# Patient Record
Sex: FEMALE | Race: WHITE | NOT HISPANIC OR LATINO | Employment: PART TIME | URBAN - METROPOLITAN AREA
[De-identification: names, ages, dates, MRNs, and addresses within clinical notes are randomized per-mention and may not be internally consistent; named-entity substitution may affect disease eponyms.]

---

## 2023-03-27 ENCOUNTER — TRANSFERRED RECORDS (OUTPATIENT)
Dept: HEALTH INFORMATION MANAGEMENT | Facility: CLINIC | Age: 33
End: 2023-03-27

## 2024-05-25 ENCOUNTER — APPOINTMENT (OUTPATIENT)
Dept: MRI IMAGING | Facility: CLINIC | Age: 34
DRG: 103 | End: 2024-05-25
Payer: MEDICARE

## 2024-05-25 ENCOUNTER — APPOINTMENT (OUTPATIENT)
Dept: CT IMAGING | Facility: CLINIC | Age: 34
DRG: 103 | End: 2024-05-25
Attending: EMERGENCY MEDICINE
Payer: MEDICARE

## 2024-05-25 ENCOUNTER — HOSPITAL ENCOUNTER (INPATIENT)
Facility: CLINIC | Age: 34
LOS: 2 days | Discharge: HOME | DRG: 103 | End: 2024-05-27
Attending: EMERGENCY MEDICINE | Admitting: STUDENT IN AN ORGANIZED HEALTH CARE EDUCATION/TRAINING PROGRAM
Payer: MEDICARE

## 2024-05-25 DIAGNOSIS — K21.9 GASTROESOPHAGEAL REFLUX DISEASE WITHOUT ESOPHAGITIS: Primary | ICD-10-CM

## 2024-05-25 DIAGNOSIS — G43.919 COMPLICATED MIGRAINE, INTRACTABLE: ICD-10-CM

## 2024-05-25 LAB
ANION GAP SERPL CALCULATED.3IONS-SCNC: 11 MMOL/L (ref 7–15)
APTT PPP: 27 SECONDS (ref 22–38)
BASOPHILS # BLD AUTO: 0 10E3/UL (ref 0–0.2)
BASOPHILS NFR BLD AUTO: 0 %
BUN SERPL-MCNC: 12.1 MG/DL (ref 6–20)
CALCIUM SERPL-MCNC: 9.7 MG/DL (ref 8.6–10)
CHLORIDE SERPL-SCNC: 102 MMOL/L (ref 98–107)
CREAT SERPL-MCNC: 0.98 MG/DL (ref 0.51–0.95)
CRP SERPL-MCNC: <3 MG/L
DEPRECATED HCO3 PLAS-SCNC: 26 MMOL/L (ref 22–29)
EGFRCR SERPLBLD CKD-EPI 2021: 77 ML/MIN/1.73M2
EOSINOPHIL # BLD AUTO: 0.3 10E3/UL (ref 0–0.7)
EOSINOPHIL NFR BLD AUTO: 3 %
ERYTHROCYTE [DISTWIDTH] IN BLOOD BY AUTOMATED COUNT: 11.9 % (ref 10–15)
GLUCOSE BLDC GLUCOMTR-MCNC: 108 MG/DL (ref 70–99)
GLUCOSE BLDC GLUCOMTR-MCNC: 75 MG/DL (ref 70–99)
GLUCOSE SERPL-MCNC: 140 MG/DL (ref 70–99)
HCG SERPL QL: NEGATIVE
HCT VFR BLD AUTO: 39.6 % (ref 35–47)
HGB BLD-MCNC: 14 G/DL (ref 11.7–15.7)
IMM GRANULOCYTES # BLD: 0 10E3/UL
IMM GRANULOCYTES NFR BLD: 0 %
INR PPP: 0.96 (ref 0.85–1.15)
LYMPHOCYTES # BLD AUTO: 3.7 10E3/UL (ref 0.8–5.3)
LYMPHOCYTES NFR BLD AUTO: 40 %
MCH RBC QN AUTO: 32.7 PG (ref 26.5–33)
MCHC RBC AUTO-ENTMCNC: 35.4 G/DL (ref 31.5–36.5)
MCV RBC AUTO: 93 FL (ref 78–100)
MONOCYTES # BLD AUTO: 0.5 10E3/UL (ref 0–1.3)
MONOCYTES NFR BLD AUTO: 6 %
NEUTROPHILS # BLD AUTO: 4.8 10E3/UL (ref 1.6–8.3)
NEUTROPHILS NFR BLD AUTO: 51 %
NRBC # BLD AUTO: 0 10E3/UL
NRBC BLD AUTO-RTO: 0 /100
PLATELET # BLD AUTO: 310 10E3/UL (ref 150–450)
POTASSIUM SERPL-SCNC: 3.9 MMOL/L (ref 3.4–5.3)
RBC # BLD AUTO: 4.28 10E6/UL (ref 3.8–5.2)
SODIUM SERPL-SCNC: 139 MMOL/L (ref 135–145)
TROPONIN T SERPL HS-MCNC: <6 NG/L
WBC # BLD AUTO: 9.3 10E3/UL (ref 4–11)

## 2024-05-25 PROCEDURE — 70450 CT HEAD/BRAIN W/O DYE: CPT

## 2024-05-25 PROCEDURE — 85025 COMPLETE CBC W/AUTO DIFF WBC: CPT | Performed by: EMERGENCY MEDICINE

## 2024-05-25 PROCEDURE — 84703 CHORIONIC GONADOTROPIN ASSAY: CPT | Performed by: EMERGENCY MEDICINE

## 2024-05-25 PROCEDURE — 250N000011 HC RX IP 250 OP 636: Performed by: EMERGENCY MEDICINE

## 2024-05-25 PROCEDURE — 85610 PROTHROMBIN TIME: CPT | Performed by: EMERGENCY MEDICINE

## 2024-05-25 PROCEDURE — 99223 1ST HOSP IP/OBS HIGH 75: CPT | Mod: AI | Performed by: STUDENT IN AN ORGANIZED HEALTH CARE EDUCATION/TRAINING PROGRAM

## 2024-05-25 PROCEDURE — 86140 C-REACTIVE PROTEIN: CPT | Performed by: EMERGENCY MEDICINE

## 2024-05-25 PROCEDURE — 82962 GLUCOSE BLOOD TEST: CPT

## 2024-05-25 PROCEDURE — 85730 THROMBOPLASTIN TIME PARTIAL: CPT | Performed by: EMERGENCY MEDICINE

## 2024-05-25 PROCEDURE — 255N000002 HC RX 255 OP 636: Performed by: EMERGENCY MEDICINE

## 2024-05-25 PROCEDURE — 250N000009 HC RX 250: Performed by: EMERGENCY MEDICINE

## 2024-05-25 PROCEDURE — 120N000002 HC R&B MED SURG/OB UMMC

## 2024-05-25 PROCEDURE — 70496 CT ANGIOGRAPHY HEAD: CPT

## 2024-05-25 PROCEDURE — 258N000003 HC RX IP 258 OP 636: Performed by: EMERGENCY MEDICINE

## 2024-05-25 PROCEDURE — 84484 ASSAY OF TROPONIN QUANT: CPT | Performed by: EMERGENCY MEDICINE

## 2024-05-25 PROCEDURE — A9585 GADOBUTROL INJECTION: HCPCS | Performed by: EMERGENCY MEDICINE

## 2024-05-25 PROCEDURE — 80048 BASIC METABOLIC PNL TOTAL CA: CPT | Performed by: EMERGENCY MEDICINE

## 2024-05-25 PROCEDURE — 70553 MRI BRAIN STEM W/O & W/DYE: CPT

## 2024-05-25 PROCEDURE — 99291 CRITICAL CARE FIRST HOUR: CPT | Mod: G0,FS | Performed by: STUDENT IN AN ORGANIZED HEALTH CARE EDUCATION/TRAINING PROGRAM

## 2024-05-25 PROCEDURE — 36415 COLL VENOUS BLD VENIPUNCTURE: CPT | Performed by: EMERGENCY MEDICINE

## 2024-05-25 RX ORDER — ONDANSETRON 2 MG/ML
4 INJECTION INTRAMUSCULAR; INTRAVENOUS ONCE
Status: COMPLETED | OUTPATIENT
Start: 2024-05-25 | End: 2024-05-25

## 2024-05-25 RX ORDER — DEXAMETHASONE SODIUM PHOSPHATE 10 MG/ML
10 INJECTION, SOLUTION INTRAMUSCULAR; INTRAVENOUS ONCE
Status: COMPLETED | OUTPATIENT
Start: 2024-05-25 | End: 2024-05-25

## 2024-05-25 RX ORDER — ONDANSETRON 2 MG/ML
4 INJECTION INTRAMUSCULAR; INTRAVENOUS
Status: COMPLETED | OUTPATIENT
Start: 2024-05-25 | End: 2024-05-25

## 2024-05-25 RX ORDER — INDOMETHACIN 50 MG/1
100 CAPSULE ORAL 2 TIMES DAILY PRN
COMMUNITY
Start: 2024-05-04

## 2024-05-25 RX ORDER — HYDROMORPHONE HYDROCHLORIDE 1 MG/ML
0.5 INJECTION, SOLUTION INTRAMUSCULAR; INTRAVENOUS; SUBCUTANEOUS ONCE
Status: COMPLETED | OUTPATIENT
Start: 2024-05-25 | End: 2024-05-25

## 2024-05-25 RX ORDER — IOPAMIDOL 755 MG/ML
67 INJECTION, SOLUTION INTRAVASCULAR ONCE
Status: COMPLETED | OUTPATIENT
Start: 2024-05-25 | End: 2024-05-25

## 2024-05-25 RX ORDER — HYDROMORPHONE HYDROCHLORIDE 1 MG/ML
0.5 INJECTION, SOLUTION INTRAMUSCULAR; INTRAVENOUS; SUBCUTANEOUS EVERY 30 MIN PRN
Status: COMPLETED | OUTPATIENT
Start: 2024-05-25 | End: 2024-05-26

## 2024-05-25 RX ORDER — SODIUM CHLORIDE 9 MG/ML
INJECTION, SOLUTION INTRAVENOUS CONTINUOUS
Status: DISCONTINUED | OUTPATIENT
Start: 2024-05-25 | End: 2024-05-27 | Stop reason: HOSPADM

## 2024-05-25 RX ORDER — LEVOTHYROXINE SODIUM 175 UG/1
175 TABLET ORAL
COMMUNITY
Start: 2024-05-04 | End: 2024-07-15

## 2024-05-25 RX ORDER — SODIUM CHLORIDE 9 MG/ML
INJECTION, SOLUTION INTRAVENOUS
Status: COMPLETED
Start: 2024-05-25 | End: 2024-05-25

## 2024-05-25 RX ORDER — AMITRIPTYLINE HYDROCHLORIDE 100 MG/1
100 TABLET ORAL AT BEDTIME
COMMUNITY
Start: 2024-05-11 | End: 2024-07-12

## 2024-05-25 RX ORDER — INSULIN LISPRO 100 [IU]/ML
INJECTION, SOLUTION INTRAVENOUS; SUBCUTANEOUS
COMMUNITY
Start: 2024-05-07 | End: 2024-07-12

## 2024-05-25 RX ORDER — GADOBUTROL 604.72 MG/ML
0.1 INJECTION INTRAVENOUS ONCE
Status: COMPLETED | OUTPATIENT
Start: 2024-05-25 | End: 2024-05-25

## 2024-05-25 RX ADMIN — ONDANSETRON 4 MG: 2 INJECTION INTRAMUSCULAR; INTRAVENOUS at 20:39

## 2024-05-25 RX ADMIN — ONDANSETRON 4 MG: 2 INJECTION INTRAMUSCULAR; INTRAVENOUS at 17:30

## 2024-05-25 RX ADMIN — HYDROMORPHONE HYDROCHLORIDE 0.5 MG: 1 INJECTION, SOLUTION INTRAMUSCULAR; INTRAVENOUS; SUBCUTANEOUS at 20:31

## 2024-05-25 RX ADMIN — DEXAMETHASONE SODIUM PHOSPHATE 10 MG: 10 INJECTION, SOLUTION INTRAMUSCULAR; INTRAVENOUS at 20:31

## 2024-05-25 RX ADMIN — VALPROATE SODIUM 500 MG: 100 INJECTION, SOLUTION INTRAVENOUS at 20:30

## 2024-05-25 RX ADMIN — SODIUM CHLORIDE 500 ML: 9 INJECTION, SOLUTION INTRAVENOUS at 18:24

## 2024-05-25 RX ADMIN — IOPAMIDOL 67 ML: 755 INJECTION, SOLUTION INTRAVENOUS at 16:05

## 2024-05-25 RX ADMIN — GADOBUTROL 8.62 ML: 604.72 INJECTION INTRAVENOUS at 17:57

## 2024-05-25 RX ADMIN — HYDROMORPHONE HYDROCHLORIDE 0.5 MG: 1 INJECTION, SOLUTION INTRAMUSCULAR; INTRAVENOUS; SUBCUTANEOUS at 19:19

## 2024-05-25 RX ADMIN — Medication 500 ML: at 18:24

## 2024-05-25 RX ADMIN — HYDROMORPHONE HYDROCHLORIDE 0.5 MG: 1 INJECTION, SOLUTION INTRAMUSCULAR; INTRAVENOUS; SUBCUTANEOUS at 21:51

## 2024-05-25 RX ADMIN — ONDANSETRON 4 MG: 2 INJECTION INTRAMUSCULAR; INTRAVENOUS at 21:51

## 2024-05-25 RX ADMIN — SODIUM CHLORIDE 80 ML: 9 INJECTION, SOLUTION INTRAVENOUS at 16:05

## 2024-05-25 ASSESSMENT — ACTIVITIES OF DAILY LIVING (ADL)
ADLS_ACUITY_SCORE: 35

## 2024-05-25 ASSESSMENT — COLUMBIA-SUICIDE SEVERITY RATING SCALE - C-SSRS
1. IN THE PAST MONTH, HAVE YOU WISHED YOU WERE DEAD OR WISHED YOU COULD GO TO SLEEP AND NOT WAKE UP?: NO
2. HAVE YOU ACTUALLY HAD ANY THOUGHTS OF KILLING YOURSELF IN THE PAST MONTH?: NO
6. HAVE YOU EVER DONE ANYTHING, STARTED TO DO ANYTHING, OR PREPARED TO DO ANYTHING TO END YOUR LIFE?: NO

## 2024-05-25 NOTE — ED TRIAGE NOTES
Patient is a type 1 diabetic. Patient's blood sugars have been normal, currently 131 in triage. Patient symptoms started two hours ago. Patient begun to have pressure behind her eyes and dizziness. Patient reports neuropathy in the feet, hands, and face. Patient cannot keep her eyes in focus. Patient has abnormal speech with slurring of words. Patient has history of hashimoto's, on synthroid,.      Patient is a smoker. Patient reports marijuana use but is not currently under the influence. Patient takes estrogen pills.

## 2024-05-25 NOTE — CONSULTS
"[facility]     Stroke Code Note          History of Present Illness     Chief Complaint: Stroke Symptoms (Patient has abnormal speech pattern, abnormal gait, new neuropathies )    Rufino Farr is a 33 year female with a PMH significant for neuropathy, T1DM, tobacco use, marijuana use, on OCP.  History obtained from Rufino and her boyfriend at bedside.  At approximately 1:15 PM Rufino developed new slurred speech, blurred vision, horizontal double vision and dizziness.  She described the dizziness as a lightheadedness/faint sensation.  While en route to the emergency room she developed bilateral arm (elbow to fingertips) and bilateral leg (knee to toes) tingling/numbness.  She notes prior episodes of slurred speech in the setting of hypoglycemia but her blood glucose is normal at 131 today.  Presenting BP is 159/116.  Allergy to aspirin.     On telestroke examination, Rufino notes bilateral blurred vision that worsens when she is sitting up.  She notes improvement of her dizziness when sitting and worsening when walking.  She describes her bilateral arm numbness/tingling as a cold sensation or as if \"all the blood was leaving from her body.\"  On neurologic exam, Rufino has left facial sensory deficit, bilateral arm sensory deficit, right lower extremity sensory deficit, right upper extremity drift that resolves with distraction testing, increased effort of right lower extremity elevation but is able to maintain antigravity without drift, extinction is present; no vision loss, dysarthria or aphasia appreciated.  NIH stroke scale 3.  Discussed with Rufino and her boyfriend at bedside that at this time would not recommend TNK as the risks outweigh the benefits and would further evaluate with MRI of the brain. Rufino and her boyfriend verbalized agreement with that plan.          Past Medical History     Stroke risk factors: diabetes, tobacco use     Preadmission " "antithrombotic regimen: none    Modified Kern Score (Pre-morbid)  0 - No symptoms.                 Assessment and Plan       Dizziness, double vision, blurred vision, right facial numbness, bilateral arm numbness, right leg numbness, right upper extremity drift, unclear etiology. Will obtain MRI Brain to evaluate for stroke.      Intravenous Thrombolysis  Not given due to:   - minor/isolated/quickly resolving symptoms     Endovascular Treatment  Not initiated due to absence of proximal vessel occlusion     Plan:  - brain MRI with and without contrast with thin cuts through the brainstem and coronal DWI; please page stroke neurology if infarct is identified for further recommendations  - Toxic/metabolic/infectious evaluation per primary team   - Recommend symptomatic management of dizziness and nausea pre primary team   - MATHEWS    Taylor Max PA-C  Vascular Neurology    To page me or covering stroke neurology team member, click here: [facility]  Choose \"On Call\" tab at top, then select \"NEUROLOGY/ALL SITES\" from middle drop-down box, press Enter, then look for \"stroke\" or \"telestroke\" for your site.  ___________________________________________________________________      Imaging/Labs   (personally reviewed all images)    CT head: No evidence of hemorrhage.  CTA head/neck: No large vessel occlusion.          Physical Examination     BP: (!) 140/90   Pulse: 72   Resp: 15   Temp: 99.2  F (37.3  C)   Temp src: Oral   SpO2: 100 %   O2 Device: None (Room air)   Weight: 86.2 kg (190 lb)    Wt Readings from Last 2 Encounters:   05/25/24 86.2 kg (190 lb)     General Exam  General:  patient lying in bed without any acute distress    HEENT:  normocephalic/atraumatic  Pulmonary:  no respiratory distress    Neuro Exam  Mental Status:  alert, oriented to age and month, follows commands, speech clear and fluent, naming and repetition normal, difficulty reading NIHSS cards due to blurred vision   Cranial Nerves: visual fields " intact (tested by nurse), EOMI with normal smooth pursuit, decreased left facial sensation, bilateral arm decreased sensation, right lower extremity decreased sensation, facial movements symmetric, hearing not formally tested but intact to conversation, no dysarthria, tongue protrusion midline  Motor: increased effort with elevation of right lower extremity but able to maintain antigravity without drift, mild right upper extremity drift, right UE drift not present with distraction testing, able to move all limbs spontaneously, no left upper or lower extremity drift   Reflexes:  unable to test (telestroke)  Sensory: decreased sensation of bilateral arm, decreased right leg sensation, extinction present on double simultaneous stimulation (assessed by nurse)  Coordination:  slow bilateral finger-to-nose, increased effort with heel-to-shin bilaterally but no dysmetria Station/Gait: able to walk a few steps with moderate contact guard assist       Stroke Scales     NIHSS  1a. Level of Consciousness 0-->Alert, keenly responsive   1b. LOC Questions 0-->Answers both questions correctly   1c. LOC Commands 0-->Performs both tasks correctly   2.   Best Gaze 0-->Normal   3.   Visual 0-->No visual loss   4.   Facial Palsy 0-->Normal symmetrical movements   5a. Motor Arm, Left 0-->No drift, limb holds 90 (or 45) degrees for full 10 secs   5b. Motor Arm, Right 1-->Drift, limb holds 90 (or 45) degrees, but drifts down before full 10 secs, does not hit bed or other support   6a. Motor Leg, Left 0-->No drift, leg holds 30 degree position for full 5 secs   6b. Motor Leg, right 0-->No drift, leg holds 30 degree position for full 5 secs   7.   Limb Ataxia 0-->Absent   8.   Sensory 1-->Mild-to-moderate sensory loss, patient feels pinprick is less sharp or is dull on the affected side, or there is a loss of superficial pain with pinprick, but patient is aware of being touched   9.   Best Language 0-->No aphasia, normal   10. Dysarthria  "0-->Normal   11. Extinction and Inattention  1-->Visual, tactile, auditory, spatial, or personal inattention or extinction to bilateral simultaneous stimulation in one of the sensory modalities   Total 3 (05/25/24 1621)       Labs     CBC  Lab Results   Component Value Date    HGB 14.0 05/25/2024    HCT 39.6 05/25/2024    WBC 9.3 05/25/2024     05/25/2024       BMP  Lab Results   Component Value Date     05/25/2024    POTASSIUM 3.9 05/25/2024    CHLORIDE 102 05/25/2024    CO2 26 05/25/2024    BUN 12.1 05/25/2024    CR 0.98 (H) 05/25/2024     (H) 05/25/2024    YESY 9.7 05/25/2024       INR  INR   Date Value Ref Range Status   05/25/2024 0.96 0.85 - 1.15 Final       Data   Stroke Code Data  (for stroke code with tele)  Stroke code activated 05/25/24  1546   First stroke provider response 05/25/24  1548   Video start time 05/25/24  1518   Video end time 05/25/24  1550   Last known normal 05/25/24  1345   Time of discovery (or onset of symptoms)  05/25/24  1345   Head CT read by Stroke Neuro Provider 05/25/24  1554   Was stroke code de-escalated? Yes  05/25/24        Telestroke Service Details  Type of service telemedicine diagnostic assessment of acute neurological changes   Reason telemedicine is appropriate patient requires assessment with a specialist for diagnosis and treatment of neurological symptoms   Mode of transmission secure interactive audio and video communication per Lennie   Originating site (patient location) [facility]    Distant site (provider location) [facility]        Clinically Significant Risk Factors Present on Admission                       # Obesity: Estimated body mass index is 32.61 kg/m  as calculated from the following:    Height as of this encounter: 1.626 m (5' 4\").    Weight as of this encounter: 86.2 kg (190 lb).              Time Spent on this Encounter   Billing: I personally " examined and evaluated the patient today. At the time of my evaluation and management the patient was critical condition today due to stroke like symptoms . I personally managed stroke code. Key decisions made today included examination, review of images, need for further images. I spent a total of 90 minutes providing critical care services, evaluating the patient, directing care and reviewing laboratory values and radiologic reports.

## 2024-05-25 NOTE — ED PROVIDER NOTES
"    [facility] [place] EMERGENCY DEPARTMENT ([facility])    5/25/24      ED PROVIDER NOTE       History     Chief Complaint   Patient presents with    Stroke Symptoms     Patient has abnormal speech pattern, abnormal gait, new neuropathies      HPI  Rufino Farr is a 33 year old female with a previous history of diabetes who apparently 2 hours prior to arrival developed an episode of slurred speech with ataxia.  Apparently these symptoms are similar to when the patient drops her blood sugar and becomes hypoglycemic although she is wearing a pump and the monitor indicates a normal blood sugar. The patient denies headache, is anxious, hyperventilating and presents to the stabilization room for evaluation.    This part of the medical record was transcribed by Alyse Moran Medical Scribe, from a dictation done by Darian Michael MD.      Past Medical History  No past medical history on file.    No past surgical history on file.    No current outpatient medications on file.    Allergies   Allergen Reactions    Aspirin Shortness Of Breath     Family History  No family history on file.    Social History       Past medical history, past surgical history, medications, allergies, family history, and social history were reviewed with the patient. No additional pertinent items.   A complete review of systems was performed with pertinent positives and negatives noted in the HPI, and all other systems negative.    Physical Exam   BP: (!) 159/116  Pulse: 69  Temp: 99.2  F (37.3  C)  Resp: 16  Height: 162.6 cm (5' 4\")  Weight: 86.2 kg (190 lb)  SpO2: 99 %  Physical Exam  Vitals and nursing note reviewed.   Constitutional:       Appearance: She is diaphoretic.      Comments: Patient was brought to the stabilization room anxious and hyperventilating   HENT:      Head: Atraumatic.   Eyes:      Extraocular Movements: Extraocular movements intact.      Pupils: Pupils are equal, round, and reactive to light.   Cardiovascular: "      Rate and Rhythm: Regular rhythm.   Pulmonary:      Comments: Good breath sounds bilaterally  Musculoskeletal:         General: No deformity.      Cervical back: Neck supple.   Neurological:      General: No focal deficit present.      Cranial Nerves: No cranial nerve deficit.      Motor: No weakness.   Psychiatric:      Comments: Anxious and hyperventilating           ED Course, Procedures, & Data     Patient was placed on cardiac monitoring and oximetry with IV being established and was treated like a stroke code.    Patient's EKG revealed a normal sinus rhythm at a rate of 83 with a OH interval point 114 and a QRS duration of 0.090.  The patient had a normal axis with no acute ST or T wave changes significant for ischemia.  This is read by me personally.    Medications   sodium chloride 0.9 % infusion ( Intravenous Rate/Dose Change 5/25/24 2159)   HYDROmorphone (PF) (DILAUDID) injection 0.5 mg (0.5 mg Intravenous $Given 5/25/24 2151)   iopamidol (ISOVUE-370) solution 67 mL (67 mLs Intravenous $Given 5/25/24 1605)     And   sodium chloride 0.9 % bag for CT scan flush use (80 mLs As instructed $Given 5/25/24 1605)   ondansetron (ZOFRAN) injection 4 mg (4 mg Intravenous $Given 5/25/24 2039)   sodium chloride 0.9% BOLUS 500 mL (500 mLs Intravenous $New Bag 5/25/24 1824)   gadobutrol (GADAVIST) injection 8.62 mL (8.62 mLs Intravenous $Given 5/25/24 1757)   HYDROmorphone (PF) (DILAUDID) injection 0.5 mg (0.5 mg Intravenous $Given 5/25/24 1919)   dexAMETHasone PF (DECADRON) injection 10 mg (10 mg Intravenous $Given 5/25/24 2031)   valproate (DEPACON) 500 mg in sodium chloride 0.9 % 50 mL intermittent infusion (500 mg Intravenous $New Bag 5/25/24 2030)   ondansetron (ZOFRAN) injection 4 mg (4 mg Intravenous $Given 5/25/24 2151)     Results for orders placed or performed during the hospital encounter of 05/25/24 (from the past 24 hour(s))   EKG 12 lead   Result Value Ref Range    Systolic Blood Pressure  mmHg     Diastolic Blood Pressure  mmHg    Ventricular Rate 83 BPM    Atrial Rate 83 BPM    KS Interval 114 ms    QRS Duration 90 ms     ms    QTc 458 ms    P Axis 73 degrees    R AXIS 84 degrees    T Axis 68 degrees    Interpretation ECG Sinus rhythm  Normal ECG      CBC with Platelets & Differential    Narrative    The following orders were created for panel order CBC with Platelets & Differential.  Procedure                               Abnormality         Status                     ---------                               -----------         ------                     CBC with platelets and d...[[id number]]                      Final result                 Please view results for these tests on the individual orders.   Basic metabolic panel   Result Value Ref Range    Sodium 139 135 - 145 mmol/L    Potassium 3.9 3.4 - 5.3 mmol/L    Chloride 102 98 - 107 mmol/L    Carbon Dioxide (CO2) 26 22 - 29 mmol/L    Anion Gap 11 7 - 15 mmol/L    Urea Nitrogen 12.1 6.0 - 20.0 mg/dL    Creatinine 0.98 (H) 0.51 - 0.95 mg/dL    GFR Estimate 77 >60 mL/min/1.73m2    Calcium 9.7 8.6 - 10.0 mg/dL    Glucose 140 (H) 70 - 99 mg/dL   INR   Result Value Ref Range    INR 0.96 0.85 - 1.15   Partial thromboplastin time   Result Value Ref Range    aPTT 27 22 - 38 Seconds   Troponin T, High Sensitivity   Result Value Ref Range    Troponin T, High Sensitivity <6 <=14 ng/L   hCG Qualitative Pregnancy   Result Value Ref Range    hCG Serum Qualitative Negative Negative   CBC with platelets and differential   Result Value Ref Range    WBC Count 9.3 4.0 - 11.0 10e3/uL    RBC Count 4.28 3.80 - 5.20 10e6/uL    Hemoglobin 14.0 11.7 - 15.7 g/dL    Hematocrit 39.6 35.0 - 47.0 %    MCV 93 78 - 100 fL    MCH 32.7 26.5 - 33.0 pg    MCHC 35.4 31.5 - 36.5 g/dL    RDW 11.9 10.0 - 15.0 %    Platelet Count 310 150 - 450 10e3/uL    % Neutrophils 51 %    % Lymphocytes 40 %    % Monocytes 6 %    % Eosinophils 3 %    % Basophils 0 %    % Immature Granulocytes 0 %     NRBCs per 100 WBC 0 <1 /100    Absolute Neutrophils 4.8 1.6 - 8.3 10e3/uL    Absolute Lymphocytes 3.7 0.8 - 5.3 10e3/uL    Absolute Monocytes 0.5 0.0 - 1.3 10e3/uL    Absolute Eosinophils 0.3 0.0 - 0.7 10e3/uL    Absolute Basophils 0.0 0.0 - 0.2 10e3/uL    Absolute Immature Granulocytes 0.0 <=0.4 10e3/uL    Absolute NRBCs 0.0 10e3/uL   CRP inflammation   Result Value Ref Range    CRP Inflammation <3.00 <5.00 mg/L   CT Head w/o Contrast    Narrative    EXAM: CTA HEAD NECK W CONTRAST, CT HEAD W/O CONTRAST  LOCATION: [facility]  DATE: 5/25/2024    INDICATION: Code Stroke to evaluate for potential thrombolysis and thrombectomy. Ataxia, dysarthria.  COMPARISON: None.  CONTRAST: 67 mL intravenous Isovue 370  TECHNIQUE: Head and neck CT angiogram with IV contrast. Noncontrast head CT followed by axial helical CT images of the head and neck vessels obtained during the arterial phase of intravenous contrast administration. Axial 2D reconstructed images and   multiplanar 3D MIP reconstructed images of the head and neck vessels were performed by the technologist. Dose reduction techniques were used. All stenosis measurements made according to NASCET criteria unless otherwise specified.    FINDINGS:   NONCONTRAST HEAD CT:   INTRACRANIAL CONTENTS: No intracranial hemorrhage, extraaxial collection, or mass effect.  No CT evidence of acute infarct. Normal parenchymal attenuation. Normal ventricles and sulci.     VISUALIZED ORBITS/SINUSES/MASTOIDS: No intraorbital abnormality. No paranasal sinus mucosal disease. No middle ear or mastoid effusion.    BONES/SOFT TISSUES: No acute abnormality.    HEAD CTA:  ANTERIOR CIRCULATION: No stenosis/occlusion, aneurysm, or high flow vascular malformation. Standard [place] anatomy.    POSTERIOR CIRCULATION: No stenosis/occlusion, aneurysm, or high flow vascular malformation. Balanced vertebral arteries supply a normal basilar artery.      DURAL VENOUS SINUSES: Expected enhancement of the major dural venous sinuses.    NECK CTA:  RIGHT CAROTID: No measurable stenosis or dissection.    LEFT CAROTID: No measurable stenosis or dissection.    VERTEBRAL ARTERIES: No focal stenosis or dissection. Balanced vertebral arteries.    AORTIC ARCH: Bovine origin left common carotid artery. No significant stenosis at the origin of the great vessels.    NONVASCULAR STRUCTURES: Unremarkable.      Impression    IMPRESSION:   HEAD CT:  1.  Normal head CT.    HEAD CTA:   1.  Normal CTA [place].    NECK CTA:  1.  Normal neck CTA.      These findings were communicated by phone to Dr. Michael on 5/25/2024 4:25 PM CDT.      CTA Head Neck with Contrast    Narrative    EXAM: CTA HEAD NECK W CONTRAST, CT HEAD W/O CONTRAST  LOCATION: [facility]  DATE: 5/25/2024    INDICATION: Code Stroke to evaluate for potential thrombolysis and thrombectomy. Ataxia, dysarthria.  COMPARISON: None.  CONTRAST: 67 mL intravenous Isovue 370  TECHNIQUE: Head and neck CT angiogram with IV contrast. Noncontrast head CT followed by axial helical CT images of the head and neck vessels obtained during the arterial phase of intravenous contrast administration. Axial 2D reconstructed images and   multiplanar 3D MIP reconstructed images of the head and neck vessels were performed by the technologist. Dose reduction techniques were used. All stenosis measurements made according to NASCET criteria unless otherwise specified.    FINDINGS:   NONCONTRAST HEAD CT:   INTRACRANIAL CONTENTS: No intracranial hemorrhage, extraaxial collection, or mass effect.  No CT evidence of acute infarct. Normal parenchymal attenuation. Normal ventricles and sulci.     VISUALIZED ORBITS/SINUSES/MASTOIDS: No intraorbital abnormality. No paranasal sinus mucosal disease. No middle ear or mastoid effusion.    BONES/SOFT TISSUES: No acute abnormality.    HEAD CTA:  ANTERIOR  CIRCULATION: No stenosis/occlusion, aneurysm, or high flow vascular malformation. Standard [place] anatomy.    POSTERIOR CIRCULATION: No stenosis/occlusion, aneurysm, or high flow vascular malformation. Balanced vertebral arteries supply a normal basilar artery.     DURAL VENOUS SINUSES: Expected enhancement of the major dural venous sinuses.    NECK CTA:  RIGHT CAROTID: No measurable stenosis or dissection.    LEFT CAROTID: No measurable stenosis or dissection.    VERTEBRAL ARTERIES: No focal stenosis or dissection. Balanced vertebral arteries.    AORTIC ARCH: Bovine origin left common carotid artery. No significant stenosis at the origin of the great vessels.    NONVASCULAR STRUCTURES: Unremarkable.      Impression    IMPRESSION:   HEAD CT:  1.  Normal head CT.    HEAD CTA:   1.  Normal CTA [place].    NECK CTA:  1.  Normal neck CTA.      These findings were communicated by phone to Dr. Michael on 5/25/2024 4:25 PM CDT.      MR Brain w/o & w Contrast    Narrative    EXAM: MR BRAIN W/O and W CONTRAST  LOCATION: [facility]  DATE: 5/25/2024    INDICATION: dizzines, double vision, left facial sensory change, bilateral arm sensory deficit, right LE sensory deficit, thin cuts through the brainstem and coronal DWI  COMPARISON: CT head 5/25/2024  CONTRAST: 8.6ml gadavist  TECHNIQUE: Routine multiplanar multisequence head MRI without and with intravenous contrast.    FINDINGS:  INTRACRANIAL CONTENTS: No acute or subacute infarct. No mass, acute hemorrhage, or extra-axial fluid collections. Normal brain parenchymal signal. Normal ventricles and sulci. Normal position of the cerebellar tonsils. No pathologic contrast enhancement.    SELLA: No abnormality accounting for technique.    OSSEOUS STRUCTURES/SOFT TISSUES: Normal marrow signal. The major intracranial vascular flow voids are maintained.     ORBITS: No abnormality accounting for technique.      SINUSES/MASTOIDS: Mild mucosal thickening scattered about the paranasal sinuses. No middle ear or mastoid effusion.       Impression    IMPRESSION:  1.  Normal head MRI.   Glucose by meter   Result Value Ref Range    GLUCOSE BY METER POCT 108 (H) 70 - 99 mg/dL   Glucose by meter   Result Value Ref Range    GLUCOSE BY METER POCT 75 70 - 99 mg/dL       Stroke neurology evaluated the patient and felt the patient was more than likely having a complicated migraine.  Patient states she has had migraines in the past.  Patient was given Decadron and Depakote along with Zofran.  The patient refused Reglan and Benadryl.  She is allergic to Toradol    ED Course as of 05/25/24 2254   Sat May 25, 2024   2108 Patient received Decadron and Depakote around 830 and is starting to feel somewhat better at this time but still has some blurriness of vision and vertigo.  At this point we will wait another hour to check her symptoms and make it admission decision at that time.     Procedures         Patient was observed in the ER for an extended period of time but her symptoms did not clear and she remains with symptoms of blurriness of vision and vertigo with ongoing nausea and a mild headache.  With the ongoing symptoms, the patient will be admitted to the hospital for further care.    Critical Care Addendum  My initial assessment, based on my review of nursing observations and focused history, established a high suspicion that Rufino Farr has ischemic or hemorrhagic stroke, which requires immediate intervention, and therefore she is critically ill.     After the initial assessment, the care team initiated multiple lab tests, initiated IV fluid administration, and consulted with stroke neurology  to provide stabilization care. Due to the critical nature of this patient, I reassessed nursing observations, physical exam, and neurologic status multiple times prior to her disposition.     Time also spent performing documentation,  discussion with consultants, and coordination of care.     Critical care time (excluding teaching time and procedures): Greater than 60 minutes.       Assessment & Plan      I have reviewed the nursing notes.     I have reviewed the treatment plan with neurology and they suggest admitted the patient to medicine at this point.    I have reviewed the findings, diagnosis, and plan with the patient.      Final diagnoses:   Complicated migraine, intractable     Adm Med    Darian Michael MD  [facility] EMERGENCY DEPARTMENT  5/25/2024     Darian Michael MD  05/25/24 1257

## 2024-05-26 LAB
GLUCOSE BLDC GLUCOMTR-MCNC: 104 MG/DL (ref 70–99)
GLUCOSE BLDC GLUCOMTR-MCNC: 135 MG/DL (ref 70–99)
GLUCOSE BLDC GLUCOMTR-MCNC: 136 MG/DL (ref 70–99)
GLUCOSE BLDC GLUCOMTR-MCNC: 144 MG/DL (ref 70–99)
GLUCOSE BLDC GLUCOMTR-MCNC: 161 MG/DL (ref 70–99)
GLUCOSE BLDC GLUCOMTR-MCNC: 213 MG/DL (ref 70–99)

## 2024-05-26 PROCEDURE — 258N000003 HC RX IP 258 OP 636: Performed by: EMERGENCY MEDICINE

## 2024-05-26 PROCEDURE — 258N000003 HC RX IP 258 OP 636: Performed by: STUDENT IN AN ORGANIZED HEALTH CARE EDUCATION/TRAINING PROGRAM

## 2024-05-26 PROCEDURE — 99223 1ST HOSP IP/OBS HIGH 75: CPT

## 2024-05-26 PROCEDURE — 99233 SBSQ HOSP IP/OBS HIGH 50: CPT | Performed by: INTERNAL MEDICINE

## 2024-05-26 PROCEDURE — 250N000011 HC RX IP 250 OP 636: Performed by: EMERGENCY MEDICINE

## 2024-05-26 PROCEDURE — 250N000011 HC RX IP 250 OP 636: Performed by: STUDENT IN AN ORGANIZED HEALTH CARE EDUCATION/TRAINING PROGRAM

## 2024-05-26 PROCEDURE — 250N000013 HC RX MED GY IP 250 OP 250 PS 637: Performed by: STUDENT IN AN ORGANIZED HEALTH CARE EDUCATION/TRAINING PROGRAM

## 2024-05-26 PROCEDURE — 82962 GLUCOSE BLOOD TEST: CPT

## 2024-05-26 PROCEDURE — 250N000013 HC RX MED GY IP 250 OP 250 PS 637: Performed by: INTERNAL MEDICINE

## 2024-05-26 PROCEDURE — 250N000009 HC RX 250: Performed by: STUDENT IN AN ORGANIZED HEALTH CARE EDUCATION/TRAINING PROGRAM

## 2024-05-26 PROCEDURE — 99222 1ST HOSP IP/OBS MODERATE 55: CPT | Performed by: STUDENT IN AN ORGANIZED HEALTH CARE EDUCATION/TRAINING PROGRAM

## 2024-05-26 PROCEDURE — 250N000011 HC RX IP 250 OP 636: Performed by: INTERNAL MEDICINE

## 2024-05-26 PROCEDURE — 120N000002 HC R&B MED SURG/OB UMMC

## 2024-05-26 RX ORDER — CETIRIZINE HYDROCHLORIDE 10 MG/1
10 TABLET ORAL DAILY
Status: DISCONTINUED | OUTPATIENT
Start: 2024-05-27 | End: 2024-05-27 | Stop reason: HOSPADM

## 2024-05-26 RX ORDER — DEXTROSE MONOHYDRATE 25 G/50ML
25-50 INJECTION, SOLUTION INTRAVENOUS
Status: DISCONTINUED | OUTPATIENT
Start: 2024-05-26 | End: 2024-05-27 | Stop reason: HOSPADM

## 2024-05-26 RX ORDER — ONDANSETRON 2 MG/ML
4 INJECTION INTRAMUSCULAR; INTRAVENOUS EVERY 6 HOURS PRN
Status: DISCONTINUED | OUTPATIENT
Start: 2024-05-26 | End: 2024-05-27 | Stop reason: HOSPADM

## 2024-05-26 RX ORDER — AMOXICILLIN 250 MG
2 CAPSULE ORAL 2 TIMES DAILY PRN
Status: DISCONTINUED | OUTPATIENT
Start: 2024-05-26 | End: 2024-05-27 | Stop reason: HOSPADM

## 2024-05-26 RX ORDER — NALOXONE HYDROCHLORIDE 0.4 MG/ML
0.2 INJECTION, SOLUTION INTRAMUSCULAR; INTRAVENOUS; SUBCUTANEOUS
Status: DISCONTINUED | OUTPATIENT
Start: 2024-05-26 | End: 2024-05-27 | Stop reason: HOSPADM

## 2024-05-26 RX ORDER — NALOXONE HYDROCHLORIDE 0.4 MG/ML
0.4 INJECTION, SOLUTION INTRAMUSCULAR; INTRAVENOUS; SUBCUTANEOUS
Status: DISCONTINUED | OUTPATIENT
Start: 2024-05-26 | End: 2024-05-27 | Stop reason: HOSPADM

## 2024-05-26 RX ORDER — CALCIUM CARBONATE 500 MG/1
1000 TABLET, CHEWABLE ORAL 4 TIMES DAILY PRN
Status: DISCONTINUED | OUTPATIENT
Start: 2024-05-26 | End: 2024-05-27 | Stop reason: HOSPADM

## 2024-05-26 RX ORDER — LIDOCAINE 40 MG/G
CREAM TOPICAL
Status: DISCONTINUED | OUTPATIENT
Start: 2024-05-26 | End: 2024-05-27 | Stop reason: HOSPADM

## 2024-05-26 RX ORDER — NICOTINE POLACRILEX 4 MG
15-30 LOZENGE BUCCAL
Status: DISCONTINUED | OUTPATIENT
Start: 2024-05-26 | End: 2024-05-27 | Stop reason: HOSPADM

## 2024-05-26 RX ORDER — PROCHLORPERAZINE 25 MG
25 SUPPOSITORY, RECTAL RECTAL EVERY 12 HOURS PRN
Status: DISCONTINUED | OUTPATIENT
Start: 2024-05-26 | End: 2024-05-27 | Stop reason: HOSPADM

## 2024-05-26 RX ORDER — ESTRADIOL 1 MG/1
1 TABLET ORAL DAILY
Status: DISCONTINUED | OUTPATIENT
Start: 2024-05-26 | End: 2024-05-27 | Stop reason: HOSPADM

## 2024-05-26 RX ORDER — IBUPROFEN 200 MG
800 TABLET ORAL EVERY 6 HOURS PRN
Status: DISCONTINUED | OUTPATIENT
Start: 2024-05-26 | End: 2024-05-27 | Stop reason: HOSPADM

## 2024-05-26 RX ORDER — HYDROMORPHONE HYDROCHLORIDE 1 MG/ML
0.5 INJECTION, SOLUTION INTRAMUSCULAR; INTRAVENOUS; SUBCUTANEOUS EVERY 4 HOURS PRN
Status: DISCONTINUED | OUTPATIENT
Start: 2024-05-26 | End: 2024-05-27 | Stop reason: HOSPADM

## 2024-05-26 RX ORDER — ACETAMINOPHEN 325 MG/1
650 TABLET ORAL EVERY 4 HOURS PRN
Status: DISCONTINUED | OUTPATIENT
Start: 2024-05-26 | End: 2024-05-27 | Stop reason: HOSPADM

## 2024-05-26 RX ORDER — ONDANSETRON 4 MG/1
4 TABLET, ORALLY DISINTEGRATING ORAL EVERY 6 HOURS PRN
Status: DISCONTINUED | OUTPATIENT
Start: 2024-05-26 | End: 2024-05-27 | Stop reason: HOSPADM

## 2024-05-26 RX ORDER — ESTRADIOL 1 MG/1
1 TABLET ORAL EVERY EVENING
COMMUNITY
End: 2024-07-12

## 2024-05-26 RX ORDER — AMOXICILLIN 250 MG
1 CAPSULE ORAL 2 TIMES DAILY PRN
Status: DISCONTINUED | OUTPATIENT
Start: 2024-05-26 | End: 2024-05-27 | Stop reason: HOSPADM

## 2024-05-26 RX ORDER — METRONIDAZOLE 7.5 MG/G
GEL VAGINAL
COMMUNITY

## 2024-05-26 RX ORDER — ONDANSETRON 4 MG/1
4 TABLET, ORALLY DISINTEGRATING ORAL EVERY 8 HOURS PRN
Status: ON HOLD | COMMUNITY
End: 2024-05-27

## 2024-05-26 RX ORDER — PROCHLORPERAZINE MALEATE 5 MG
5-10 TABLET ORAL EVERY 6 HOURS PRN
Status: DISCONTINUED | OUTPATIENT
Start: 2024-05-26 | End: 2024-05-27 | Stop reason: HOSPADM

## 2024-05-26 RX ORDER — DIPHENHYDRAMINE HCL 25 MG
25 CAPSULE ORAL EVERY 6 HOURS PRN
Status: DISCONTINUED | OUTPATIENT
Start: 2024-05-26 | End: 2024-05-27 | Stop reason: HOSPADM

## 2024-05-26 RX ORDER — CETIRIZINE HYDROCHLORIDE 5 MG/1
5 TABLET ORAL DAILY
Status: DISCONTINUED | OUTPATIENT
Start: 2024-05-26 | End: 2024-05-26

## 2024-05-26 RX ORDER — PROCHLORPERAZINE MALEATE 5 MG
10 TABLET ORAL EVERY 6 HOURS PRN
Status: DISCONTINUED | OUTPATIENT
Start: 2024-05-26 | End: 2024-05-26

## 2024-05-26 RX ORDER — PROCHLORPERAZINE 25 MG
25 SUPPOSITORY, RECTAL RECTAL EVERY 12 HOURS PRN
Status: DISCONTINUED | OUTPATIENT
Start: 2024-05-26 | End: 2024-05-26

## 2024-05-26 RX ORDER — CETIRIZINE HYDROCHLORIDE 10 MG/1
10 TABLET ORAL EVERY EVENING
COMMUNITY
End: 2024-09-25

## 2024-05-26 RX ORDER — DIPHENHYDRAMINE HYDROCHLORIDE 50 MG/ML
50 INJECTION INTRAMUSCULAR; INTRAVENOUS ONCE
Status: COMPLETED | OUTPATIENT
Start: 2024-05-26 | End: 2024-05-26

## 2024-05-26 RX ORDER — CETIRIZINE HYDROCHLORIDE 5 MG/1
5 TABLET ORAL ONCE
Qty: 1 TABLET | Refills: 0 | Status: COMPLETED | OUTPATIENT
Start: 2024-05-26 | End: 2024-05-26

## 2024-05-26 RX ADMIN — SODIUM CHLORIDE: 9 INJECTION, SOLUTION INTRAVENOUS at 16:33

## 2024-05-26 RX ADMIN — HYDROMORPHONE HYDROCHLORIDE 0.5 MG: 1 INJECTION, SOLUTION INTRAMUSCULAR; INTRAVENOUS; SUBCUTANEOUS at 16:28

## 2024-05-26 RX ADMIN — CETIRIZINE HYDROCHLORIDE 5 MG: 5 TABLET ORAL at 12:12

## 2024-05-26 RX ADMIN — ACETAMINOPHEN 650 MG: 325 TABLET, FILM COATED ORAL at 12:30

## 2024-05-26 RX ADMIN — ESTRADIOL 1 MG: 1 TABLET ORAL at 10:16

## 2024-05-26 RX ADMIN — SODIUM CHLORIDE: 9 INJECTION, SOLUTION INTRAVENOUS at 05:43

## 2024-05-26 RX ADMIN — HYDROMORPHONE HYDROCHLORIDE 0.5 MG: 1 INJECTION, SOLUTION INTRAMUSCULAR; INTRAVENOUS; SUBCUTANEOUS at 00:10

## 2024-05-26 RX ADMIN — IBUPROFEN 800 MG: 200 TABLET, FILM COATED ORAL at 18:57

## 2024-05-26 RX ADMIN — HYDROMORPHONE HYDROCHLORIDE 0.5 MG: 1 INJECTION, SOLUTION INTRAMUSCULAR; INTRAVENOUS; SUBCUTANEOUS at 12:12

## 2024-05-26 RX ADMIN — VALPROATE SODIUM 500 MG: 100 INJECTION, SOLUTION INTRAVENOUS at 01:06

## 2024-05-26 RX ADMIN — PROCHLORPERAZINE MALEATE 10 MG: 5 TABLET ORAL at 10:16

## 2024-05-26 RX ADMIN — IBUPROFEN 800 MG: 200 TABLET, FILM COATED ORAL at 10:14

## 2024-05-26 RX ADMIN — HYDROMORPHONE HYDROCHLORIDE 0.5 MG: 1 INJECTION, SOLUTION INTRAMUSCULAR; INTRAVENOUS; SUBCUTANEOUS at 21:03

## 2024-05-26 RX ADMIN — CETIRIZINE HYDROCHLORIDE 5 MG: 5 TABLET ORAL at 10:16

## 2024-05-26 RX ADMIN — AMITRIPTYLINE HYDROCHLORIDE 100 MG: 25 TABLET, FILM COATED ORAL at 21:03

## 2024-05-26 RX ADMIN — PROCHLORPERAZINE EDISYLATE 10 MG: 5 INJECTION INTRAMUSCULAR; INTRAVENOUS at 16:28

## 2024-05-26 RX ADMIN — ONDANSETRON 4 MG: 4 TABLET, ORALLY DISINTEGRATING ORAL at 03:41

## 2024-05-26 RX ADMIN — PROCHLORPERAZINE EDISYLATE 10 MG: 5 INJECTION INTRAMUSCULAR; INTRAVENOUS at 23:01

## 2024-05-26 RX ADMIN — AMITRIPTYLINE HYDROCHLORIDE 100 MG: 25 TABLET, FILM COATED ORAL at 01:20

## 2024-05-26 ASSESSMENT — ACTIVITIES OF DAILY LIVING (ADL)
ADLS_ACUITY_SCORE: 29
ADLS_ACUITY_SCORE: 35
ADLS_ACUITY_SCORE: 29
ADLS_ACUITY_SCORE: 20
ADLS_ACUITY_SCORE: 29

## 2024-05-26 NOTE — PROGRESS NOTES
Pt has R arm edema. Spoke to Medicine MD who advised to continue to monitor.     Pt has numbness and tingling in hands and feet; started yesterday. MD paged.

## 2024-05-26 NOTE — MEDICATION SCRIBE - ADMISSION MEDICATION HISTORY
Medication Scribe Admission Medication History    Admission medication history is complete. The information provided in this note is only as accurate as the sources available at the time of the update.    Information Source(s): Patient and CareEverywhere/SureScripts via in-person    Pertinent Information: Patient reported she took levothyroxine this morning on accident but is typically only taken M-F.     Changes made to PTA medication list:  Added: amitriptyline, humalog, indomethacin, levothyroxine   Deleted: None  Changed: None    Allergies reviewed with patient and updates made in EHR: yes    Medication History Completed By: Sobia Sylvester 5/25/2024 10:59 PM    PTA Med List   Medication Sig Last Dose    amitriptyline (ELAVIL) 100 MG tablet Take 100 mg by mouth at bedtime 5/24/2024 at PM    HUMALOG 100 UNIT/ML injection MAX DAILY DOSE OF 65 UNITS VIA INSULIN PUMP 5/25/2024    indomethacin (INDOCIN) 50 MG capsule Take 50 mg by mouth 3 times daily as needed 5/25/2024 at AM    levothyroxine (SYNTHROID/LEVOTHROID) 175 MCG tablet Take 175 mcg by mouth MONDAY TO FRIDAY ONLY 5/25/2024 at AM

## 2024-05-26 NOTE — PLAN OF CARE
Goal Outcome Evaluation:      Plan of Care Reviewed With: patient    Overall Patient Progress: improvingOverall Patient Progress: improving    VS: VSS   O2: SpO2 > 90% and stable on RA. LS clear and equal bilaterally. Denies chest pain and SOB.    Output: Voids spontaneously without difficulty to bathroom    Last BM: 5/24, denies abdominal discomfort. BS active    Activity: Up with A 1   Skin: WDL. 2 Nurse skin assessment completed with Flower LEIJA RN   Pain: Headache, managed with medications    CMS: Intact, AOx4. Denies numbness and tingling.   Dressing: None    Diet: Regular diet. Denies nausea/vomiting.   BG checks before meals and at bedtime.    LDA: L PIV infusing NS at 100 mL/hr.    Equipment: IV pole, personal belongings,    Plan: Continue with plan of care. Call light within reach, pt able to make needs known.    Additional Info:

## 2024-05-26 NOTE — PROGRESS NOTES
"[facility]    Medicine Progress Note - Hospitalist Service, GOLD TEAM 17    Date of Admission:  5/25/2024    Assessment & Plan     Rufino Farr is a 33 year old female with T1DM, hypothyroidism, recurrent nausea and vomiting, who is presented  for blurry vision, dysarthria, and ataxia. She was seen by neurology in ED and ruled out for stroke      Day of [place] [facility] [place] and felt dizzy. She felt like she was stumbling and floating in a dreamlike state. She states her feet, hands, legs, and arms had a soft static and pins and needle sensation.blood sugar was normal.also endorsed some word finding difficulties and needing to concentrate in order to move her body. Her boyfriend brought her to the emergency department for further care.      Blurry vision, dysarthria, ataxia, double vision, blurred vision   - Seen by neurology in the emergency department , had normal brain MRI , normal  CT head CTA neck   - by admitting team there were concerns  for idiopathic intracranial hypertension given patient's age, autoimmune history, hx of worsening symptoms when laying down. , discussed with  neurology and asking for opthalmology to see. If patient does have papilledema then will likely need an LP if not then  no LP , consider of acetazolamide if appropriate  or topiramate, will defer back to neurology   - 5/25 , complains of  double vison coming back, she did describe double vision when was looking at my finger with both eyes  but  also described double vision when looking at my finger with each eye while  the other eye was covered . while covering up   right eye testing left eye she said  \" starting to morph into 2 \" then saw 2 fingers  same thing when covered up other eye   - had issues with finger to nose both sided due to double vision but also missed nose , did well with heal to shin bilaterally, was weak in upper ext and hand  4/5 both " "side, good strength in lower extremities, Babinsky downgoing bilateral . Had reported decreased sensation over left side but not consistent on exam.     - while  I was in there doing exam and interview , she did bend over to pick out fruit from bowl with her mouth and not hand       Headache  - in ED received valproic acid 500 mg x 2, fluids , decadron , now headache Is coming back   - headache more middle of brain  towards fron and behind eyes, aches behind eyes, the pain head like pulsating 6/1-0 , pain on outside rubber band like above brow but goes to back 4-5/10   - laying down flat, moving makes it worse, has not tested with coughing  - also complains of  dizziness, since  1 PM if like someone was spinning her around, in all directions , does stay in one direction then switches  - when dizziness is worse then has tinnitus , ringing in both ears  , she has never had this before    - does have whooshing noise in ears that has been going on for a while, 1-2 years few times a month   -\" cannot get eyes into focus\" , also states has double vision, fuzziness of things worse on the periphery   - different from her migraines headache, but those were  more band like pain in back of h ead , then went to eyes , has not had one in 4 years and was felt to me more tension related   - has auditory processing disorder    - light bothers her more, but not noises . She was in room without light off   -has some upper trap pain but that I snot new   - gets nausea but gets intermittent nausea vomiting x 2 years   - no recent change on estradiol or medications   - states Toradol gets very itchy  and would scratch off her skin but takes ibuprofen without issues at home,  takes about 2 x a week at home for joint pains     Addendum  - discussed with  neurology, has vestibular migraine recommended  zofran  but patient  responded to compazine better so ok to use compazine IV q 8 hr fo rnow, benadryl 50 mg IV x 1 then benadryl 25 mg pu q " "6 hr PRN, continue tylenol, ibuprofen as needed, holding off on extrs  steroids for now in light  of her DM            History Fibromyalgia  - possibly with flare       History of intermittent Nausea vomiting  - uses daily marijuana, has tried being off for weeks to months hopson no improvement in  her nausea vomiting  , does not take hot showers   - had profuse vomiting night before admission   - history negative work up  for gastroparesis in past    -smokes marijuana once or twice a day, which she uses to manage her complex PTSD, anxiety, and major depression     T1DM   - Continue use of ambulatory insulin pump  - continue accu-checks , monitor also for  Hypoglycemia       Hypothyroidism  - Levothyroxine 175 mcg M-F (ordered to start Monday and ordered to end on Fri)      Menopause  -history of hysterectomy and also had bilateral oophorectomy, had history oophorectomy  - She takes estradiol , continue      Anxiety  Depression  - smokes marijuana once or twice a day  - has not been seeing    Just moved back to [place] in March [facility]        Diet: Combination Diet Regular Diet Adult    DVT Prophylaxis: Ambulate every shift  Ascencio Catheter: Not present  Lines: None     Cardiac Monitoring: None  Code Status: Full Code      Clinically Significant Risk Factors Present on Admission                       # Obesity: Estimated body mass index is 32.61 kg/m  as calculated from the following:    Height as of this encounter: 1.626 m (5' 4\").    Weight as of this encounter: 86.2 kg (190 lb).              Disposition Plan     Medically Ready for Discharge: Anticipated in 2-4 Days             Marina Coronel MD  Hospitalist Service, GOLD TEAM 17  [facility]  Securely message with [employer] (more info)  Text page via [employer] Paging/Directory   See signed in provider for up to date coverage information  ______________________________________________________________________    Interval History " "    Her headache is coming back as well as her dizziness and doubel vision as above, some nausea but no emesis     Physical Exam   Vital Signs: Temp: 98.2  F (36.8  C) Temp src: Oral BP: 126/78 Pulse: 78   Resp: 18 SpO2: 98 % O2 Device: None (Room air)    Weight: 190 lbs 0 oz  General appearence: awake alert   no apparent distress    HEENT: EOMI, PEARLA, sclera nonicteric,  moist,  mucus membranes,   NECK : supple  RESPIRATORY: lungs clear    CARDIOVASCULAR:S1 S2 regular rate and rhythm, no rubs gallops or murmurs appreciated  GASTROINTESTINAL:soft, non-distended , non-tender , + bowel sounds,   SKIN: warm and dry, no mottling noted   NEUROLOGIC; awake alert and oriented,describe double vision when was looking at my finger with both eyes  but  also described double vision when looking at my finger with each eye while  the other eye was covered . while covering up   right eye testing left eye she said  \" starting to morph into 2 \" then saw 2 fingers  same thing when covered up other eye   - had issues with finger to nose both sided due to double vision but also missed nose , did well with heal to shin bilaterally, was weak in upper ext and hand  4/5 both side, good strength in lower extremities, Babinsky downgoing bilateral . Had reported decreased sensation over left side but not consistent on exam.     EXTREMITIES: no clubbing, cyanosis or edema , moves all extremity, good pedal pulses   MUSCULOSKELETAL: without deformity     Data     I have personally reviewed the following data over the past 24 hrs:    9.3  \   14.0   / 310     139 102 12.1 /  213 (H)   3.9 26 0.98 (H) \     Trop: <6 BNP: N/A     Procal: N/A CRP: <3.00 Lactic Acid: N/A       INR:  0.96 PTT:  27   D-dimer:  N/A Fibrinogen:  N/A       Imaging results reviewed over the past 24 hrs:   Recent Results (from the past 24 hour(s))   CT Head w/o Contrast    Narrative    EXAM: CTA HEAD NECK W CONTRAST, CT HEAD W/O CONTRAST  LOCATION: [facility]  DATE: 5/25/2024    INDICATION: Code Stroke to evaluate for potential thrombolysis and thrombectomy. Ataxia, dysarthria.  COMPARISON: None.  CONTRAST: 67 mL intravenous Isovue 370  TECHNIQUE: Head and neck CT angiogram with IV contrast. Noncontrast head CT followed by axial helical CT images of the head and neck vessels obtained during the arterial phase of intravenous contrast administration. Axial 2D reconstructed images and   multiplanar 3D MIP reconstructed images of the head and neck vessels were performed by the technologist. Dose reduction techniques were used. All stenosis measurements made according to NASCET criteria unless otherwise specified.    FINDINGS:   NONCONTRAST HEAD CT:   INTRACRANIAL CONTENTS: No intracranial hemorrhage, extraaxial collection, or mass effect.  No CT evidence of acute infarct. Normal parenchymal attenuation. Normal ventricles and sulci.     VISUALIZED ORBITS/SINUSES/MASTOIDS: No intraorbital abnormality. No paranasal sinus mucosal disease. No middle ear or mastoid effusion.    BONES/SOFT TISSUES: No acute abnormality.    HEAD CTA:  ANTERIOR CIRCULATION: No stenosis/occlusion, aneurysm, or high flow vascular malformation. Standard [place] anatomy.    POSTERIOR CIRCULATION: No stenosis/occlusion, aneurysm, or high flow vascular malformation. Balanced vertebral arteries supply a normal basilar artery.     DURAL VENOUS SINUSES: Expected enhancement of the major dural venous sinuses.    NECK CTA:  RIGHT CAROTID: No measurable stenosis or dissection.    LEFT CAROTID: No measurable stenosis or dissection.    VERTEBRAL ARTERIES: No focal stenosis or dissection. Balanced vertebral arteries.    AORTIC ARCH: Bovine origin left common carotid artery. No significant stenosis at the origin of the great vessels.    NONVASCULAR STRUCTURES: Unremarkable.      Impression    IMPRESSION:   HEAD CT:  1.  Normal head CT.    HEAD CTA:   1.  Normal CTA  [place].    NECK CTA:  1.  Normal neck CTA.      These findings were communicated by phone to Dr. Michael on 5/25/2024 4:25 PM CDT.      CTA Head Neck with Contrast    Narrative    EXAM: CTA HEAD NECK W CONTRAST, CT HEAD W/O CONTRAST  LOCATION: [facility]  DATE: 5/25/2024    INDICATION: Code Stroke to evaluate for potential thrombolysis and thrombectomy. Ataxia, dysarthria.  COMPARISON: None.  CONTRAST: 67 mL intravenous Isovue 370  TECHNIQUE: Head and neck CT angiogram with IV contrast. Noncontrast head CT followed by axial helical CT images of the head and neck vessels obtained during the arterial phase of intravenous contrast administration. Axial 2D reconstructed images and   multiplanar 3D MIP reconstructed images of the head and neck vessels were performed by the technologist. Dose reduction techniques were used. All stenosis measurements made according to NASCET criteria unless otherwise specified.    FINDINGS:   NONCONTRAST HEAD CT:   INTRACRANIAL CONTENTS: No intracranial hemorrhage, extraaxial collection, or mass effect.  No CT evidence of acute infarct. Normal parenchymal attenuation. Normal ventricles and sulci.     VISUALIZED ORBITS/SINUSES/MASTOIDS: No intraorbital abnormality. No paranasal sinus mucosal disease. No middle ear or mastoid effusion.    BONES/SOFT TISSUES: No acute abnormality.    HEAD CTA:  ANTERIOR CIRCULATION: No stenosis/occlusion, aneurysm, or high flow vascular malformation. Standard [place] anatomy.    POSTERIOR CIRCULATION: No stenosis/occlusion, aneurysm, or high flow vascular malformation. Balanced vertebral arteries supply a normal basilar artery.     DURAL VENOUS SINUSES: Expected enhancement of the major dural venous sinuses.    NECK CTA:  RIGHT CAROTID: No measurable stenosis or dissection.    LEFT CAROTID: No measurable stenosis or dissection.    VERTEBRAL ARTERIES: No focal stenosis or dissection. Balanced  vertebral arteries.    AORTIC ARCH: Bovine origin left common carotid artery. No significant stenosis at the origin of the great vessels.    NONVASCULAR STRUCTURES: Unremarkable.      Impression    IMPRESSION:   HEAD CT:  1.  Normal head CT.    HEAD CTA:   1.  Normal CTA [place].    NECK CTA:  1.  Normal neck CTA.      These findings were communicated by phone to Dr. Michael on 5/25/2024 4:25 PM CDT.      MR Brain w/o & w Contrast    Narrative    EXAM: MR BRAIN W/O and W CONTRAST  LOCATION: [facility]  DATE: 5/25/2024    INDICATION: dizzines, double vision, left facial sensory change, bilateral arm sensory deficit, right LE sensory deficit, thin cuts through the brainstem and coronal DWI  COMPARISON: CT head 5/25/2024  CONTRAST: 8.6ml gadavist  TECHNIQUE: Routine multiplanar multisequence head MRI without and with intravenous contrast.    FINDINGS:  INTRACRANIAL CONTENTS: No acute or subacute infarct. No mass, acute hemorrhage, or extra-axial fluid collections. Normal brain parenchymal signal. Normal ventricles and sulci. Normal position of the cerebellar tonsils. No pathologic contrast enhancement.    SELLA: No abnormality accounting for technique.    OSSEOUS STRUCTURES/SOFT TISSUES: Normal marrow signal. The major intracranial vascular flow voids are maintained.     ORBITS: No abnormality accounting for technique.     SINUSES/MASTOIDS: Mild mucosal thickening scattered about the paranasal sinuses. No middle ear or mastoid effusion.       Impression    IMPRESSION:  1.  Normal head MRI.     Recent Labs   Lab 05/26/24  1215 05/26/24  0819 05/26/24  0130 05/25/24 2029 05/25/24  1548   WBC  --   --   --   --  9.3   HGB  --   --   --   --  14.0   MCV  --   --   --   --  93   PLT  --   --   --   --  310   INR  --   --   --   --  0.96   NA  --   --   --   --  139   POTASSIUM  --   --   --   --  3.9   CHLORIDE  --   --   --   --  102   CO2  --   --   --   --  26    BUN  --   --   --   --  12.1   CR  --   --   --   --  0.98*   ANIONGAP  --   --   --   --  11   YESY  --   --   --   --  9.7   * 161* 144*   < > 140*    < > = values in this interval not displayed.

## 2024-05-26 NOTE — PHARMACY-CONSULT NOTE
"Pharmacy Was consulted to \"verify formulation and dose of estrogen pill and whether or not it is a combination pill with progesterone. Unable to find and verify in medical record\"    Talked to patient and patient confirmed that she is on Estradiol 1 mg po every evening at home. Last Dose was taken last Friday evening.    Thank you for your consultation. Please ask pharmacy if you have any other questions.  "

## 2024-05-26 NOTE — PLAN OF CARE
"Goal Outcome Evaluation:           Overall Patient Progress: no changeOverall Patient Progress: no change         Psych evaluated pt today. Neurology evaluated pt today.     VS: /71   Pulse 76   Temp 97.6  F (36.4  C) (Oral)   Resp 16   Ht 1.626 m (5' 4\")   Wt 86.2 kg (190 lb)   SpO2 100%   BMI 32.61 kg/m      O2: SpO2 > 90% and stable on RA. LS clear and equal bilaterally. Denies chest pain and SOB.    Output: Voids spontaneously without difficulty to bathroom    Last BM: 5/24, denies abdominal discomfort. BS active    Activity: Up with A2 with walker and GB  Unsteady with walking    Skin: R arm +1 edema   Pain: Headache, managed with Tylenol, Ibuprofen, IV Dilaudid   CMS: Intact, AOx4.   Numbness and tingling in hands and feet; started yesterday   Dressing: None    Diet: Regular diet.   BG checks before meals and at bedtime.   Has nausea intermittent. Compazine given and nausea improved.    LDA: L PIV infusing NS at 100 mL/hr.   Personal home insulin pump LUQ abdomen   Equipment: IV pole, personal belongings,    Plan: Continue with plan of care. Pain management.    Additional Info:  Has bilateral eye double vision that changes to double vision and then changes back to double vision.     Pt experiencing dizziness, headache, hot flashes.     Significant other here during this shift.     This writer advised pt to notify the RN if she gives an insulin pump IV bolus.          "

## 2024-05-26 NOTE — PHARMACY-ADMISSION MEDICATION HISTORY
Pharmacist Admission Medication History    Admission medication history is complete. The information provided in this note is only as accurate as the sources available at the time of the update.    Information Source(s): Patient via phone    Pertinent Information: Patient stated that she just moved from [place] to [place] recently.  Patient has insulin pump on her. She uses Insulin Lispro to fill the pump.     Changes made to PTA medication list:  Added: cetirizine, Estradiol, Metronidazole gel and Ondansetron   Deleted: None  Changed: Indomethacin 50 mg po tid prn >> 100 mg po bid prn    Allergies reviewed with patient and updates made in EHR: no    Medication History Completed By: Nikolay Singh [facility] 5/26/2024 10:13 AM    PTA Med List   Medication Sig Last Dose    amitriptyline (ELAVIL) 100 MG tablet Take 100 mg by mouth at bedtime 5/24/2024 at PM    cetirizine (ZYRTEC) 10 MG tablet Take 10 mg by mouth every evening 5/24/2024    estradiol (ESTRACE) 1 MG tablet Take 1 mg by mouth every evening 5/24/2024    HUMALOG 100 UNIT/ML injection MAX DAILY DOSE OF 65 UNITS VIA INSULIN PUMP 5/25/2024    indomethacin (INDOCIN) 50 MG capsule Take 100 mg by mouth 2 times daily as needed for moderate pain Past Week    levothyroxine (SYNTHROID/LEVOTHROID) 175 MCG tablet Take 175 mcg by mouth MONDAY TO FRIDAY ONLY 5/25/2024 at AM    metroNIDAZOLE (METROGEL) 0.75 % vaginal gel Place vaginally twice a week As needed     ondansetron (ZOFRAN ODT) 4 MG ODT tab Take 4 mg by mouth every 8 hours as needed for nausea More than a month

## 2024-05-26 NOTE — H&P
[facility]    History and Physical - Hospitalist Service       Date of Admission:  5/25/2024    Assessment & Plan      Rufino Farr is a 33 year old female with T1DM, hypothyroidism, recurrent nausea and vomiting, who is presenting today for blurry vision, dysarthria, and ataxia s/p stroke rule out.     Blurry vision, dysarthria, ataxia  Seen by neurology in the emergency department and stroke ruled out. Concern for idiopathic intracranial hypertension given patient's age, autoimmune history, hx of worsening symptoms when laying down. Additionally, complicated migraine also on the ddx. S/p valproate in the emergency department with noticeable improvement in symptoms, per patient.   - valproic acid 500 mg x 1  - consider lumbar puncture and initiation of acetazolamide if appropriate (topiramate also an alternative agent and has been used in the setting of cluster headaches)  - Discussed use of toradol - patient would like to avoid this for now.  - Ordered compazine (can be used as part of headache cocktail) and zofran for nausea management   - Neurology consult placed    T1DM   - Continue use of ambulatory insulin pump  - Hypoglycemia protocol    Hypothyroidism  - Levothyroxine 175 mcg M-F (ordered to start Monday and ordered to end on Fri)    ?Premature Menopause  Patient states that she is taking estrogen for menopause. Unclear whether or not she is taking a combination pill vs. Estrogen replacement therapy.   - Pharmacy consult placed for medication review       Diet: NPO for Medical/Clinical Reasons Except for: No Exceptions    DVT Prophylaxis: Pneumatic Compression Devices  Ascencio Catheter: Not present  Lines: None     Cardiac Monitoring: None  Code Status:  Full Code     Clinically Significant Risk Factors Present on Admission                       # Obesity: Estimated body mass index is 32.61 kg/m  as calculated from the following:    Height as of this encounter:  "1.626 m (5' 4\").    Weight as of this encounter: 86.2 kg (190 lb).              Disposition Plan     Medically Ready for Discharge: Anticipated in 2-4 Days         Nilesh Wilcox MD  Hospitalist Service  [facility]  Securely message with [employer] (more info)  Text page via [facility] Paging/Directory     ______________________________________________________________________    Chief Complaint   Dysarthria, dizziness, blurry vision    History is obtained from the patient and patient's boyfriend.    History of Present Illness   Rufino Farr is a 33 year old female who neuropathy, T1DM, tobacco use, marijuana use, on OCP     Rufino was at the [employer] [place] with her boyfriend when she became nauseated and felt dizzy.  She felt like she was stumbling and floating in a dreamlike state.  She states her feet, hands, legs, and arms had a soft static and pins and needle sensation.  She immediately checked her blood sugar which was normal.  She went to her boyfriend's house that was nearby, sat down, and had some water.  She felt a little bit better before beginning to feel worsening of her symptoms.  During this time, she also endorsed some word finding difficulties and needing to concentrate in order to move her body.  Her boyfriend brought her to the emergency department for further care.    The past 2 years, she has been struggling with nausea and intermittent random vomiting episodes.  This is worse at night.  She will wake up at 3:00 in the morning profusely vomiting, to the point that she has burst blood vessel in her eye.  1 year prior to presentation, she had a gastroparesis study that was negative.  Since that time, she has continued to have intermittent vomiting episodes.  She smokes marijuana once or twice a day, which she uses to manage her complex PTSD, anxiety, and major depression.  She has been prescribed Zofran but will only use it in an absolute emergency.  She was " "placed on a PPI, but felt it was causing more problems so she stopped taking this.    She endorses a history of vertigo and being incredibly sensitive to pressure or elevation changes.  She also notes that she cannot read in a car because she ends up getting nauseated.  She cannot tolerate positional changes.  She has no history of inner ear problems.  Her hearing is fine.  She does endorse an auditory processing disorder.    Her diabetes has been well-controlled.  Her thyroid function has been harder to control.  She takes oral estrogen for \"premature menopause.\"     On presentation, patient was afebrile with a elevated blood pressure of 159/116. She was given dexamethasone 10 mg x 1, Dilaudid 0.5 mg x 4, Zofran, 500 mL normal saline bolus, and valproate.  CBC was unremarkable.  BMP was unremarkable. Neurology was consulted in the emergency department and a stroke code was called. Head and neck CTA was normal.  Head CT normal.  MRI brain with and without contrast without abnormality.  Recommended UDS and symptomatic management of dizziness and nausea, as well as toxic/metabolic/infectious evaluation.  She was admitted to the inpatient unit for further workup and care.      Past Medical History    Type 1 diabetes  Hypothyroidism  Marijauana use   Estrogen use    Past Surgical History  - no relevant past surgical history    Prior to Admission Medications   Prior to Admission Medications   Prescriptions Last Dose Informant Patient Reported? Taking?   HUMALOG 100 UNIT/ML injection 5/25/2024  Yes Yes   Sig: MAX DAILY DOSE OF 65 UNITS VIA INSULIN PUMP   amitriptyline (ELAVIL) 100 MG tablet 5/24/2024 at PM  Yes Yes   Sig: Take 100 mg by mouth at bedtime   indomethacin (INDOCIN) 50 MG capsule 5/25/2024 at AM  Yes Yes   Sig: Take 50 mg by mouth 3 times daily as needed   levothyroxine (SYNTHROID/LEVOTHROID) 175 MCG tablet 5/25/2024 at AM  Yes Yes   Sig: Take 175 mcg by mouth MONDAY TO FRIDAY ONLY      Facility-Administered " Medications: None        Physical Exam   Vital signs: Temp:  [98.2  F (36.8  C)-99.2  F (37.3  C)] 98.2  F (36.8  C)  Pulse:  [60-81] 78  Resp:  [11-18] 18  BP: (119-159)/() 126/78  SpO2:  [98 %-100 %] 98 %    ** Exam limited due to patient distress and availability **    General Appearance: Well developed, well nourished, in significant distress  Skin: Blanca-colored hue to bilateral face, worse with onset of nausea.  HEENT: EOMI intact, anicteric sclera, clear conjunctiva, normal external ear anatomy, normal nose anatomy, no lesions, scars, or masses. Normal mucosa lips, teeth, and gums.   Neck: No masses  Cardiovascular: RRR  Lungs: No tachypnea  Abdomen: Non-distended, no ascites  Musculoskeletal: Muscle tone normal.  Extremities: No cyanosis, clubbing or edema.  Neurologic: Alert and oriented x 3. Distressed with congruent mood. CX II-XII grossly intact. Normal sensation.           Medical Decision Making         Data   Results for orders placed or performed during the hospital encounter of 05/25/24 (from the past 24 hour(s))   EKG 12 lead   Result Value Ref Range    Systolic Blood Pressure  mmHg    Diastolic Blood Pressure  mmHg    Ventricular Rate 83 BPM    Atrial Rate 83 BPM    GA Interval 114 ms    QRS Duration 90 ms     ms    QTc 458 ms    P Axis 73 degrees    R AXIS 84 degrees    T Axis 68 degrees    Interpretation ECG Sinus rhythm  Normal ECG      CBC with Platelets & Differential    Narrative    The following orders were created for panel order CBC with Platelets & Differential.  Procedure                               Abnormality         Status                     ---------                               -----------         ------                     CBC with platelets and d...[[id number]]                      Final result                 Please view results for these tests on the individual orders.   Basic metabolic panel   Result Value Ref Range    Sodium 139 135 - 145 mmol/L    Potassium 3.9  3.4 - 5.3 mmol/L    Chloride 102 98 - 107 mmol/L    Carbon Dioxide (CO2) 26 22 - 29 mmol/L    Anion Gap 11 7 - 15 mmol/L    Urea Nitrogen 12.1 6.0 - 20.0 mg/dL    Creatinine 0.98 (H) 0.51 - 0.95 mg/dL    GFR Estimate 77 >60 mL/min/1.73m2    Calcium 9.7 8.6 - 10.0 mg/dL    Glucose 140 (H) 70 - 99 mg/dL   INR   Result Value Ref Range    INR 0.96 0.85 - 1.15   Partial thromboplastin time   Result Value Ref Range    aPTT 27 22 - 38 Seconds   Troponin T, High Sensitivity   Result Value Ref Range    Troponin T, High Sensitivity <6 <=14 ng/L   hCG Qualitative Pregnancy   Result Value Ref Range    hCG Serum Qualitative Negative Negative   CBC with platelets and differential   Result Value Ref Range    WBC Count 9.3 4.0 - 11.0 10e3/uL    RBC Count 4.28 3.80 - 5.20 10e6/uL    Hemoglobin 14.0 11.7 - 15.7 g/dL    Hematocrit 39.6 35.0 - 47.0 %    MCV 93 78 - 100 fL    MCH 32.7 26.5 - 33.0 pg    MCHC 35.4 31.5 - 36.5 g/dL    RDW 11.9 10.0 - 15.0 %    Platelet Count 310 150 - 450 10e3/uL    % Neutrophils 51 %    % Lymphocytes 40 %    % Monocytes 6 %    % Eosinophils 3 %    % Basophils 0 %    % Immature Granulocytes 0 %    NRBCs per 100 WBC 0 <1 /100    Absolute Neutrophils 4.8 1.6 - 8.3 10e3/uL    Absolute Lymphocytes 3.7 0.8 - 5.3 10e3/uL    Absolute Monocytes 0.5 0.0 - 1.3 10e3/uL    Absolute Eosinophils 0.3 0.0 - 0.7 10e3/uL    Absolute Basophils 0.0 0.0 - 0.2 10e3/uL    Absolute Immature Granulocytes 0.0 <=0.4 10e3/uL    Absolute NRBCs 0.0 10e3/uL   CRP inflammation   Result Value Ref Range    CRP Inflammation <3.00 <5.00 mg/L   CT Head w/o Contrast    Narrative    EXAM: CTA HEAD NECK W CONTRAST, CT HEAD W/O CONTRAST  LOCATION: [facility]  DATE: 5/25/2024    INDICATION: Code Stroke to evaluate for potential thrombolysis and thrombectomy. Ataxia, dysarthria.  COMPARISON: None.  CONTRAST: 67 mL intravenous Isovue 370  TECHNIQUE: Head and neck CT angiogram with IV contrast.  Noncontrast head CT followed by axial helical CT images of the head and neck vessels obtained during the arterial phase of intravenous contrast administration. Axial 2D reconstructed images and   multiplanar 3D MIP reconstructed images of the head and neck vessels were performed by the technologist. Dose reduction techniques were used. All stenosis measurements made according to NASCET criteria unless otherwise specified.    FINDINGS:   NONCONTRAST HEAD CT:   INTRACRANIAL CONTENTS: No intracranial hemorrhage, extraaxial collection, or mass effect.  No CT evidence of acute infarct. Normal parenchymal attenuation. Normal ventricles and sulci.     VISUALIZED ORBITS/SINUSES/MASTOIDS: No intraorbital abnormality. No paranasal sinus mucosal disease. No middle ear or mastoid effusion.    BONES/SOFT TISSUES: No acute abnormality.    HEAD CTA:  ANTERIOR CIRCULATION: No stenosis/occlusion, aneurysm, or high flow vascular malformation. Standard [place] anatomy.    POSTERIOR CIRCULATION: No stenosis/occlusion, aneurysm, or high flow vascular malformation. Balanced vertebral arteries supply a normal basilar artery.     DURAL VENOUS SINUSES: Expected enhancement of the major dural venous sinuses.    NECK CTA:  RIGHT CAROTID: No measurable stenosis or dissection.    LEFT CAROTID: No measurable stenosis or dissection.    VERTEBRAL ARTERIES: No focal stenosis or dissection. Balanced vertebral arteries.    AORTIC ARCH: Bovine origin left common carotid artery. No significant stenosis at the origin of the great vessels.    NONVASCULAR STRUCTURES: Unremarkable.      Impression    IMPRESSION:   HEAD CT:  1.  Normal head CT.    HEAD CTA:   1.  Normal CTA [place].    NECK CTA:  1.  Normal neck CTA.      These findings were communicated by phone to Dr. Michael on 5/25/2024 4:25 PM CDT.      CTA Head Neck with Contrast    Narrative    EXAM: CTA HEAD NECK W CONTRAST, CT HEAD W/O CONTRAST  LOCATION: [facility]  DATE: 5/25/2024    INDICATION: Code Stroke to evaluate for potential thrombolysis and thrombectomy. Ataxia, dysarthria.  COMPARISON: None.  CONTRAST: 67 mL intravenous Isovue 370  TECHNIQUE: Head and neck CT angiogram with IV contrast. Noncontrast head CT followed by axial helical CT images of the head and neck vessels obtained during the arterial phase of intravenous contrast administration. Axial 2D reconstructed images and   multiplanar 3D MIP reconstructed images of the head and neck vessels were performed by the technologist. Dose reduction techniques were used. All stenosis measurements made according to NASCET criteria unless otherwise specified.    FINDINGS:   NONCONTRAST HEAD CT:   INTRACRANIAL CONTENTS: No intracranial hemorrhage, extraaxial collection, or mass effect.  No CT evidence of acute infarct. Normal parenchymal attenuation. Normal ventricles and sulci.     VISUALIZED ORBITS/SINUSES/MASTOIDS: No intraorbital abnormality. No paranasal sinus mucosal disease. No middle ear or mastoid effusion.    BONES/SOFT TISSUES: No acute abnormality.    HEAD CTA:  ANTERIOR CIRCULATION: No stenosis/occlusion, aneurysm, or high flow vascular malformation. Standard [place] anatomy.    POSTERIOR CIRCULATION: No stenosis/occlusion, aneurysm, or high flow vascular malformation. Balanced vertebral arteries supply a normal basilar artery.     DURAL VENOUS SINUSES: Expected enhancement of the major dural venous sinuses.    NECK CTA:  RIGHT CAROTID: No measurable stenosis or dissection.    LEFT CAROTID: No measurable stenosis or dissection.    VERTEBRAL ARTERIES: No focal stenosis or dissection. Balanced vertebral arteries.    AORTIC ARCH: Bovine origin left common carotid artery. No significant stenosis at the origin of the great vessels.    NONVASCULAR STRUCTURES: Unremarkable.      Impression    IMPRESSION:   HEAD CT:  1.  Normal head CT.    HEAD CTA:   1.  Normal CTA  [place].    NECK CTA:  1.  Normal neck CTA.      These findings were communicated by phone to Dr. Michael on 5/25/2024 4:25 PM CDT.      MR Brain w/o & w Contrast    Narrative    EXAM: MR BRAIN W/O and W CONTRAST  LOCATION: [facility]  DATE: 5/25/2024    INDICATION: dizzines, double vision, left facial sensory change, bilateral arm sensory deficit, right LE sensory deficit, thin cuts through the brainstem and coronal DWI  COMPARISON: CT head 5/25/2024  CONTRAST: 8.6ml gadavist  TECHNIQUE: Routine multiplanar multisequence head MRI without and with intravenous contrast.    FINDINGS:  INTRACRANIAL CONTENTS: No acute or subacute infarct. No mass, acute hemorrhage, or extra-axial fluid collections. Normal brain parenchymal signal. Normal ventricles and sulci. Normal position of the cerebellar tonsils. No pathologic contrast enhancement.    SELLA: No abnormality accounting for technique.    OSSEOUS STRUCTURES/SOFT TISSUES: Normal marrow signal. The major intracranial vascular flow voids are maintained.     ORBITS: No abnormality accounting for technique.     SINUSES/MASTOIDS: Mild mucosal thickening scattered about the paranasal sinuses. No middle ear or mastoid effusion.       Impression    IMPRESSION:  1.  Normal head MRI.   Glucose by meter   Result Value Ref Range    GLUCOSE BY METER POCT 108 (H) 70 - 99 mg/dL   Glucose by meter   Result Value Ref Range    GLUCOSE BY METER POCT 75 70 - 99 mg/dL   Glucose by meter   Result Value Ref Range    GLUCOSE BY METER POCT 135 (H) 70 - 99 mg/dL   Glucose by meter   Result Value Ref Range    GLUCOSE BY METER POCT 144 (H) 70 - 99 mg/dL
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
## 2024-05-27 VITALS
SYSTOLIC BLOOD PRESSURE: 114 MMHG | HEIGHT: 64 IN | RESPIRATION RATE: 16 BRPM | OXYGEN SATURATION: 100 % | DIASTOLIC BLOOD PRESSURE: 80 MMHG | BODY MASS INDEX: 32.44 KG/M2 | TEMPERATURE: 97.8 F | HEART RATE: 70 BPM | WEIGHT: 190 LBS

## 2024-05-27 LAB
ATRIAL RATE - MUSE: 83 BPM
DIASTOLIC BLOOD PRESSURE - MUSE: NORMAL MMHG
GLUCOSE BLDC GLUCOMTR-MCNC: 144 MG/DL (ref 70–99)
GLUCOSE BLDC GLUCOMTR-MCNC: 172 MG/DL (ref 70–99)
INTERPRETATION ECG - MUSE: NORMAL
P AXIS - MUSE: 73 DEGREES
PR INTERVAL - MUSE: 114 MS
QRS DURATION - MUSE: 90 MS
QT - MUSE: 390 MS
QTC - MUSE: 458 MS
R AXIS - MUSE: 84 DEGREES
SYSTOLIC BLOOD PRESSURE - MUSE: NORMAL MMHG
T AXIS - MUSE: 68 DEGREES
VENTRICULAR RATE- MUSE: 83 BPM

## 2024-05-27 PROCEDURE — 99233 SBSQ HOSP IP/OBS HIGH 50: CPT | Performed by: STUDENT IN AN ORGANIZED HEALTH CARE EDUCATION/TRAINING PROGRAM

## 2024-05-27 PROCEDURE — 99239 HOSP IP/OBS DSCHRG MGMT >30: CPT | Performed by: INTERNAL MEDICINE

## 2024-05-27 PROCEDURE — 250N000013 HC RX MED GY IP 250 OP 250 PS 637: Performed by: INTERNAL MEDICINE

## 2024-05-27 PROCEDURE — 250N000013 HC RX MED GY IP 250 OP 250 PS 637: Performed by: STUDENT IN AN ORGANIZED HEALTH CARE EDUCATION/TRAINING PROGRAM

## 2024-05-27 PROCEDURE — 258N000003 HC RX IP 258 OP 636: Performed by: EMERGENCY MEDICINE

## 2024-05-27 PROCEDURE — 250N000011 HC RX IP 250 OP 636: Performed by: INTERNAL MEDICINE

## 2024-05-27 RX ORDER — IBUPROFEN 800 MG/1
800 TABLET, FILM COATED ORAL EVERY 6 HOURS PRN
Status: SHIPPED
Start: 2024-05-27

## 2024-05-27 RX ORDER — ONDANSETRON 4 MG/1
4 TABLET, ORALLY DISINTEGRATING ORAL EVERY 8 HOURS PRN
Qty: 14 TABLET | Refills: 0 | Status: SHIPPED | OUTPATIENT
Start: 2024-05-27

## 2024-05-27 RX ORDER — ACETAMINOPHEN 325 MG/1
650 TABLET ORAL EVERY 4 HOURS PRN
Status: SHIPPED
Start: 2024-05-27

## 2024-05-27 RX ORDER — CALCIUM CARBONATE 500 MG/1
1 TABLET, CHEWABLE ORAL 4 TIMES DAILY PRN
Status: SHIPPED
Start: 2024-05-27

## 2024-05-27 RX ADMIN — SODIUM CHLORIDE: 9 INJECTION, SOLUTION INTRAVENOUS at 02:34

## 2024-05-27 RX ADMIN — LEVOTHYROXINE SODIUM 175 MCG: 150 TABLET ORAL at 07:59

## 2024-05-27 RX ADMIN — PROCHLORPERAZINE EDISYLATE 10 MG: 5 INJECTION INTRAMUSCULAR; INTRAVENOUS at 07:59

## 2024-05-27 RX ADMIN — IBUPROFEN 800 MG: 200 TABLET, FILM COATED ORAL at 08:00

## 2024-05-27 RX ADMIN — ESTRADIOL 1 MG: 1 TABLET ORAL at 08:00

## 2024-05-27 ASSESSMENT — ACTIVITIES OF DAILY LIVING (ADL)
ADLS_ACUITY_SCORE: 29

## 2024-05-27 NOTE — PLAN OF CARE
Goal Outcome Evaluation:      Plan of Care Reviewed With: patient    Overall Patient Progress: improvingOverall Patient Progress: improving    VS: VSS   O2: SpO2 > 90% and stable on RA. LS clear and equal bilaterally. Denies chest pain and SOB.    Output: Voids spontaneously without difficulty to bathroom    Last BM: 5/26, denies abdominal discomfort. BS active    Activity: Up with A 1-2   Skin: WDL.    Pain: Tylenol, IV dilaudid available    CMS: Intact, AOx4. Denies numbness and tingling.   Dressing: None    Diet: Regular diet. Denies nausea/vomiting.   BG checks before meals and at bedtime.    LDA: L PIV infusing NS at 100 mL/hr.    Equipment: IV pole, personal belongings,    Plan: Continue with plan of care. Call light within reach, pt able to make needs known.    Additional Info:

## 2024-05-27 NOTE — PLAN OF CARE
"Goal Outcome Evaluation:    Shift: [id number]     VS: /84 (BP Location: Right arm)   Pulse 69   Temp 98.2  F (36.8  C) (Oral)   Resp 14   Ht 1.626 m (5' 4\")   Wt 86.2 kg (190 lb)   SpO2 100%   BMI 32.61 kg/m         Pain: Headache managed with scheduled and PRN pain meds  Neuro: A&Ox4; calm and cooperative. Able to make needs known. Reports  n/t in lower extremities since yesterday- provider aware.           Resp: WDL. On RA. Denies sob/chest pain.   Diet: Regular diet, thin liquids, takes pills whole with water  Activity: AX2 wth walker and GB, significant other helps when available.  Output: Continent of bowel/bladder-voids spontaneously without difficulties   Additional info: Reports bilateral eye double vision that changes to double vision and then changes back to double vision, also reporting headache and hot flashes.    Patient is diabetic using insulin pump IV bolus.   Plan: Pain Management,  call light within reach, continue with POC       Problem: Adult Inpatient Plan of Care  Goal: Plan of Care Review  Description: The Plan of Care Review/Shift note should be completed every shift.  The Outcome Evaluation is a brief statement about your assessment that the patient is improving, declining, or no change.  This information will be displayed automatically on your shift  note.  Outcome: Progressing  Flowsheets (Taken 5/26/2024 2033)  Plan of Care Reviewed With:   patient   significant other  Goal: Patient-Specific Goal (Individualized)  Description: You can add care plan individualizations to a care plan. Examples of Individualization might be:  \"Parent requests to be called daily at 9am for status\", \"I have a hard time hearing out of my right ear\", or \"Do not touch me to wake me up as it startles  me\".  Outcome: Progressing  Goal: Absence of Hospital-Acquired Illness or Injury  Outcome: Progressing  Goal: Optimal Comfort and Wellbeing  Outcome: Progressing  Goal: Readiness for Transition of " Care  Outcome: Progressing         Plan of Care Reviewed With: patient, significant other

## 2024-05-27 NOTE — PROGRESS NOTES
"[facility]  Neurology Progress Note     Patient Name:  Rufino Farr  MRN:  [id number]    :  1990  Date of Service:  May 26, 2024  Primary care provider:  No Ref-Primary, Physician      Neurology consultation service was asked to see Rufino Farr by Dr. Londono to evaluate for visual changes, headache.      History of Present Illness:   Rufino Farr is a 33 year old female with T1DM, hypothyroidism, recurrent nausea and vomiting, who is presented  for blurry vision, dysarthria, and ataxia. While at a museum she became nauseated and felt dizzy. She felt like she was stumbling and floating in a dreamlike state. She stated that her feet, hands, legs, and arms had a soft static and pins and needle sensation.     Today she is much improved, asking to go home. Has been walking independently to the bathroom. Has a history of migraine for which she used topiramate and sumatriptan with relief. Now has migraines only rarely, every couple of months.     ROS  A comprehensive ROS was performed and pertinent findings were included in HPI.     PMH  No past medical history on file.  No past surgical history on file.    Medications   I have personally reviewed the patient's medication list.     Allergies  I have personally reviewed the patient's allergy list.         Physical Examination   Vitals: /84 (BP Location: Right arm)   Pulse 69   Temp 98.2  F (36.8  C) (Oral)   Resp 14   Ht 1.626 m (5' 4\")   Wt 86.2 kg (190 lb)   SpO2 100%   BMI 32.61 kg/m    General: Lying in bed, NAD  Head: NC/AT  Eyes: no icterus, op pink and moist  Cardiac: RRR. Extremities warm, no edema.   Respiratory: non-labored on RA  GI: S/NT/ND  Skin: No rash or lesion on exposed skin  Psych: Mood pleasant, affect congruent  Neuro:  Mental status: Awake, alert, attentive, oriented to self, time, place, and circumstance. Language is fluent and coherent with intact comprehension of complex commands, naming " and repetition.  Cranial nerves: VFF, PERRL, conjugate gaze, EOMI, facial sensation intact, face symmetric, shoulder shrug strong, tongue/uvula midline, no dysarthria.   Motor: Normal bulk and tone. No abnormal movements. 5/5 strength bilaterally in deltoids, biceps, triceps, hand , hip flexors, hip extensors, knee flexion, knee extension, plantarflexion, dorsiflexion.   Reflexes: Normo-reflexic and symmetric biceps, brachioradialis, triceps, patellae, and achilles. Negative Norman, no clonus, toes down-going.  Sensory: Intact to light touch, pin, vibration, and proprioception in proximal and distal aspects of all 4 extremities   Coordination: FNF and HS without ataxia or dysmetria. Rapid alternating movements intact.   Gait: Normal width, stride length, turn.    Investigations   I have personally reviewed pertinent labs, tests, and radiological imaging. Discussion of notable findings is included under Impression.     MRI Brain 5/25/24  IMPRESSION:  1.  Normal head MRI.    IMPRESSION:   HEAD CT:  1.  Normal head CT.     HEAD CTA:   1.  Normal CTA [place].     NECK CTA:  1.  Normal neck CTA.    Was patient transferred from outside hospital?   No    Impression  #history of migraine headache  #migraine with vestibular features   #history of BPPV     Ms. Farr presented with ocular pain, visual changes, followed by headache. She had a normal MRI brain, evaluation by ophthalmology which was normal, and symptoms have improved after receiving headache focused treatment. She denied that her visual symptoms appeared as a visual scotoma (I showed example photos).     In all, symptoms fit with a migraine with vestibular features, possibly ocular migraine. We discussed using sumatriptan which was helpful years ago; sleep hygiene; regular exercise; and rare headache making daily preventative medications unnecessary.     Recommendations  -Ok to discharge to home  - Sumatriptan prescription on discharge- discussed  safe use including taking at earliest sign of headache and not taking more than 2 doses within 24 hours  - Outpatient vestibular rehab  - Outpatient general neurology follow up for migraine     Thank you for involving Neurology in the care of Rufino Farr.  Please do not hesitate to call with questions.     Gurjit Carroll MD    Billed as a level 3 service due to an acute on chronic presentation leading to severe loss of bodily function (severe pain, hospitalization, stroke symptoms). I have reviewed notes from the primary team.I have reviewed labs including sodium, potassium, creatinine, white count, platelets and hemoglobin which are unremarkable. I have reviewed head/brain imaging studies which are unremarkable and discussed the case with the neurology resident team, the patient and significant other.

## 2024-05-27 NOTE — CONSULTS
OPHTHALMOLOGY CONSULT NOTE  05/26/24    Patient: Rufino Farr    ASSESSMENT/PLAN:   #Acute onset convergence excess  Rufino Farr is a 33 year old female who presented with sudden onset dizziness and double vision. Examination revealed 20/30 right and 20/25 left eye uncorrected vision. PERRL and normal IOP. Motility is full but notably patient found to have esophoria at near but not at distance. Anterior exam and posterior exam unremarkable bilaterally.     Intermittent esotropia at near with esophoria at distance concerning for breakdown of esophoria or convergence spasm. Unclear etiology but MR brain and orbits reviewed and no concerning findings noted.    Plan:  -Follow up with ophthalmology in a few weeks outpatient (we will arrange)    Seen with Dr. Willis who agreed with this assessment and plan.     Ophthalmology will sign off. Please contact ophthalmologist on call with any questions or concerns. We appreciate your care of this patient.    Thank you for entrusting us with your care  Fransisco Li MD, PGY2  Ophthalmology Resident  [facility]    HISTORY OF PRESENTING ILLNESS:     Rufino Farr is a 33 year old female with PMH of hypothyroidism and T1DM who presented on 5/26/24 with swimming vision and diplopia.    Patient experienced sudden onset dizziness and spinning of the vision. With this she's had double vision when the spinning worsens. With both eyes open her central vision is double. With each eye looking individually, she notices monocular diplopia with peripheral gaze. Also feeling pressure behind the eyes and issues with depth perception. Mild light sensitivity.     Having a headache which started after receiving contrast for the MRI. Feels it in the middle of her head and behind her eye.     10+ review of systems were otherwise negative except for that which has been stated above.      OCULAR/MEDICAL/SURGICAL HISTORIES:     Past Ocular History:   Prior eye surgery/laser:  No  Contact lens wear: No  Glasses: No  Eyedrops: No    Pertinent Systemic Medications:   Current Facility-Administered Medications   Medication Dose Route Frequency Provider Last Rate Last Admin    acetaminophen (TYLENOL) tablet 650 mg  650 mg Oral Q4H PRN Marina Coronel MD   650 mg at 05/26/24 1230    amitriptyline (ELAVIL) tablet 100 mg  100 mg Oral At Bedtime Vesna Londono MD   100 mg at 05/26/24 2103    calcium carbonate (TUMS) chewable tablet 1,000 mg  1,000 mg Oral 4x Daily PRN Vesna Londono MD        [START ON 5/27/2024] cetirizine (zyrTEC) tablet 10 mg  10 mg Oral Daily Marina Coronel MD        glucose gel 15-30 g  15-30 g Oral Q15 Min PRN Vesna Londono MD        Or    dextrose 50 % injection 25-50 mL  25-50 mL Intravenous Q15 Min PRN Vesna Londono MD        Or    glucagon injection 1 mg  1 mg Subcutaneous Q15 Min PRN Vesna Londono MD        diphenhydrAMINE (BENADRYL) capsule 25 mg  25 mg Oral Q6H PRN Marina Coronel MD        estradiol (ESTRACE) tablet 1 mg  1 mg Oral Daily Marina Coronel MD   1 mg at 05/26/24 1016    HYDROmorphone (PF) (DILAUDID) injection 0.5 mg  0.5 mg Intravenous Q4H PRN Marina Coronel MD   0.5 mg at 05/26/24 2103    ibuprofen (ADVIL/MOTRIN) tablet 800 mg  800 mg Oral Q6H PRN Marina Coronel MD   800 mg at 05/26/24 1857    insulin aspart (NovoLOG/FIASP) 100 UNIT/ML VIAL FOR FILLING PUMP RESERVOIR   Device See Admin Instructions Vesna Londono MD        insulin basal rate from AMBULATORY PUMP   Subcutaneous Continuous Vesna Londono MD   Rate Verify at 05/26/24 0537    insulin bolus from AMBULATORY PUMP   Subcutaneous 4x Daily AC & HS Vesna Londono MD   1.8 Units at 05/26/24 1217    insulin bolus from AMBULATORY PUMP   Subcutaneous TID AC Vesna Londono MD        insulin bolus from AMBULATORY PUMP   Subcutaneous With Snacks or Supplements Vesna Londono MD        [START ON 5/27/2024] levothyroxine (SYNTHROID/LEVOTHROID)  tablet 175 mcg  175 mcg Oral Daily Vesna Londono MD        lidocaine (LMX4) cream   Topical Q1H PRN Vesna Londono MD        lidocaine 1 % 0.1-1 mL  0.1-1 mL Other Q1H PRN Vesna Londono MD        naloxone (NARCAN) injection 0.2 mg  0.2 mg Intravenous Q2 Min PRN Vesna Londono MD        Or    naloxone (NARCAN) injection 0.4 mg  0.4 mg Intravenous Q2 Min PRN Vesna Londono MD        Or    naloxone (NARCAN) injection 0.2 mg  0.2 mg Intramuscular Q2 Min PRN Vesna Londono MD        Or    naloxone (NARCAN) injection 0.4 mg  0.4 mg Intramuscular Q2 Min PRN Vesna Londono MD        ondansetron (ZOFRAN ODT) ODT tab 4 mg  4 mg Oral Q6H PRN Vesna Londono MD   4 mg at 05/26/24 0341    Or    ondansetron (ZOFRAN) injection 4 mg  4 mg Intravenous Q6H PRN Vesna Londono MD        prochlorperazine (COMPAZINE) injection 10 mg  10 mg Intravenous Q8H Marina Coronel MD   10 mg at 05/26/24 2301    prochlorperazine (COMPAZINE) injection 5-10 mg  5-10 mg Intravenous Q6H PRN Marina Coronel MD        Or    prochlorperazine (COMPAZINE) tablet 5-10 mg  5-10 mg Oral Q6H PRN Marina Coronel MD   10 mg at 05/26/24 1016    Or    prochlorperazine (COMPAZINE) suppository 25 mg  25 mg Rectal Q12H PRN Marina Coronel MD        senna-docusate (SENOKOT-S/PERICOLACE) 8.6-50 MG per tablet 1 tablet  1 tablet Oral BID PRN Vesna Londono MD        Or    senna-docusate (SENOKOT-S/PERICOLACE) 8.6-50 MG per tablet 2 tablet  2 tablet Oral BID PRN Vesna Londono MD        sodium chloride (PF) 0.9% PF flush 3 mL  3 mL Intracatheter Q8H Vesna Londono MD        sodium chloride (PF) 0.9% PF flush 3 mL  3 mL Intracatheter q1 min prn Vesna Londono MD        sodium chloride 0.9 % infusion   Intravenous Continuous Darian Michael  mL/hr at 05/26/24 1633 New Bag at 05/26/24 1633     Past Medical History:  No past medical history on file.    Past Surgical History:   No past surgical history  "on file.    Family History:  No history of macular degeneration or glaucoma    Social History:  Unknown    EXAMINATION:     Base Eye Exam       Visual Acuity (Snellen - Linear)         Right Left    Near sc 20/30 20/25 -3    Near cc 20/25 +2               Tonometry (Tonopen, 10:41 PM)         Right Left    Pressure 20 19              Visual Fields         Left Right     Full Full   Full to motion and CF             Extraocular Movement         Right Left     Full Full   ET             Neuro/Psych       Oriented x3: Yes    Mood/Affect: Normal              Dilation       Both eyes: 1.0% Mydriacyl, 2.5% Alexandr Synephrine @ 10:36 PM                  Strabismus Exam       Distance Near Near +3DS N Bifocals    E flick E(T) Builds with cross cover                 0 0 0   0 0 0                       0  0   0  0                       0 0 0   0 0 0                       Slit Lamp and Fundus Exam       External Exam         Right Left    External Normal Normal              Slit Lamp Exam         Right Left    Lids/Lashes Normal Normal    Conjunctiva/Sclera White and quiet White and quiet    Cornea Clear Clear    Anterior Chamber Deep and quiet Deep and quiet    Iris Round and reactive -> dilated Round and reactive -> dilated    Lens Clear Clear    Anterior Vitreous Normal Normal              Fundus Exam         Right Left    Disc Normal Normal    C/D Ratio 0.2 0.2    Macula Normal Normal    Vessels Normal Normal    Periphery Normal Normal                    Labs/Studies/Imaging Performed  MR Brain w/wo contrast  \"IMPRESSION:  1.  Normal head MRI.\"     Fransisco Li MD  Resident Physician, PGY2  Department of Ophthalmology  05/26/24 10:21 PM           "

## 2024-05-27 NOTE — PROGRESS NOTES
DISCHARGE SUMMARY    Pt discharging to: home  Transportation: car  AVS given and discussed: Pt was given AVS and pt states understanding of content. Pt has no further questions.   Medications given: Yes, script. No further questions.   Belongings returned: Yes, ensured all belongings packed and sent with pt. No items in security.   Comments: Escorted safely to elevators. Pt left at 114

## 2024-05-27 NOTE — DISCHARGE SUMMARY
"[facility]  Hospitalist Discharge Summary      Date of Admission:  5/25/2024  Date of Discharge:  5/27/2024 11:52 AM  Discharging Provider: Marina Coronel MD  Discharge Service: Hospitalist Service, GOLD TEAM 17    Discharge Diagnoses     Blurry vision, dysarthria, ataxia, double vision, blurred vision   Suspect Vestibular migraine   Headache, likely migraine   History Fibromyalgia  History of intermittent Nausea vomiting  T1DM   Hypothyroidism  Menopause  Anxiety  Depression    Clinically Significant Risk Factors     # Obesity: Estimated body mass index is 32.61 kg/m  as calculated from the following:    Height as of this encounter: 1.626 m (5' 4\").    Weight as of this encounter: 86.2 kg (190 lb).       Follow-ups Needed After Discharge   Follow-up Appointments     Adult [facility] Follow-up and recommended labs and tests      Follow up with primary care provider, Physician No Ref-Primary, within 7   days for hospital follow- up.      Opthalmology in 2 weeks       Appointments on [place] and/or [facility] (with [facility] or [facility]   provider or service). Call [phone] if you haven't heard regarding   these appointments within 7 days of discharge.              Discharge Disposition   Discharged to home  Condition at discharge: Stable    Hospital Course   Rufino Farr is a 33 year old female with T1DM, hypothyroidism, recurrent nausea and vomiting, who is presented  for blurry vision, dysarthria, and ataxia. She was seen by neurology in ED and ruled out for stroke      Day of presentation [place] [facility] [place] became nauseated and felt dizzy. She felt like she was stumbling and floating in a dreamlike state. She states her feet, hands, legs, and arms had a soft static and pins and needle sensation.blood sugar was normal.also endorsed some word finding difficulties and needing to concentrate in order to move her body. Her boyfriend brought her to the emergency " "department for further care.         Blurry vision, dysarthria, ataxia, double vision, blurred vision   Suspect Vestibular migraine   - Seen by neurology in the emergency department , had normal brain MRI , normal  CT head CTA neck , stroke was ruled out   - reseen by  neurology, has peripheral vertigo, suspect  vestibular migraine recommended  OTC antiemetic  benadryl 50 mg IV x 1 then benadryl 25 mg pu q 6 hr PRN, continue tylenol, ibuprofen as needed, held off on extra  steroids for now in light  of her DM   - she is doing much better now and wanted discharge    - by admitting team there were concerns  for idiopathic intracranial hypertension given patient's age, autoimmune history, hx of worsening symptoms when laying down.  No papillary edema noted by opthalmology, no LP recommended by neurology     - seen by ophthalmology , appreciate input , patient  Is to follow up  with opthalmology in few  weeks     Headache. Likely  migraine   - in ED received valproic acid 500 mg x 2, fluids , decadron , now headache Is coming back   - headache more middle of brain  towards fron and behind eyes, aches behind eyes, the pain head like pulsating 6/1-0 , pain on outside rubber band like above brow but goes to back 4-5/10   - laying down flat, moving makes it worse, has not tested with coughing  - also complains of  dizziness, since  1 PM if like someone was spinning her around, in all directions , does stay in one direction then switches  - when dizziness is worse then has tinnitus , ringing in both ears  , she has never had this before    - does have whooshing noise in ears that has been going on for a while, 1-2 years few times a month   -\" cannot get eyes into focus\" , also states has double vision, fuzziness of things worse on the periphery   - different from her migraines headache, but those were  more band like pain in back of h ead , then went to eyes , has not had one in 4 years and was felt to me more tension " related   - has auditory processing disorder    - light bothers her more, but not noises . She was in room without light off   - no recent change on estradiol or medications   - states Toradol gets very itchy  and would scratch off her skin but takes ibuprofen without issues at home,  takes about 2 x a week at home for joint pains   - now doing much better       History Fibromyalgia  - possibly with flare       History of intermittent Nausea vomiting  - uses daily marijuana, has tried being off for weeks to months hopson no improvement in  her nausea vomiting  , does not take hot showers   - had profuse vomiting night before admission , likely due to dizziness , vestibular migraine, now resolved   - history negative work up  for gastroparesis in past    -smokes marijuana once or twice a day, which she uses to manage her complex PTSD, anxiety, and major depression     T1DM   - Continue use of ambulatory insulin pump  - blood sugar remained acceptable       Hypothyroidism  - Levothyroxine 175 mcg M-F (ordered to start Monday and ordered to end on Fri)      Menopause  -history of hysterectomy and also had bilateral oophorectomy, had history oophorectomy  - She takes estradiol , continue      Anxiety  Depression  - smokes marijuana once or twice a day  - has not been seeing    Just moved back to [place] in March [facility] , she will be looking for new PCP and does  not wish help with it        Diet: Combination Diet Regular Diet Adult    DVT Prophylaxis: Ambulate every shift  Ascencio Catheter: Not present  Lines: None     Cardiac Monitoring: None  Code Status: Full Code      Consultations This Hospital Stay   NEUROLOGY GENERAL ADULT IP CONSULT  NEUROLOGY GENERAL ADULT IP CONSULT  PHARMACY IP CONSULT  MEDICATION HISTORY IP PHARMACY CONSULT  PSYCHIATRY IP CONSULT  NEUROLOGY GENERAL ADULT IP CONSULT  OPHTHALMOLOGY IP CONSULT    Code Status   Full Code    Time Spent on this Encounter   I, Marina Coronel MD, personally saw the  patient today and spent greater than 30 minutes discharging this patient.       Marina Coronel MD  [facility] MED SURG ORTHOPEDIC  [address] [address]  Phone: [phone]  Fax: [phone]  ______________________________________________________________________    Physical Exam   Vital Signs: Temp: 97.8  F (36.6  C) Temp src: Oral BP: 114/80 Pulse: 70   Resp: 16 SpO2: 100 % O2 Device: None (Room air)    Weight: 190 lbs 0 oz  General appearence: awake alert   no apparent distress    HEENT: EOMI, PEARLA, sclera nonicteric,  moist,  dry mucus membranes,   NECK : supple  RESPIRATORY: lungs clear   CARDIOVASCULAR:S1 S2 regular rate and rhythm, no rubs gallops or murmurs appreciated  GASTROINTESTINAL:soft, non-distended , non-tender , + bowel sounds,   SKIN: warm and dry, no mottling noted   NEUROLOGIC; awake alert and oriented, no focal deficits found  EXTREMITIES: no clubbing, cyanosis or edema , moves all extremity, good pedal pulses   MUSCULOSKELETAL: without deformity          Primary Care Physician   Physician No Ref-Primary    Discharge Orders      Reason for your hospital stay    Vestibular migraine     Activity    Your activity upon discharge: activity as tolerated     Adult [facility] Follow-up and recommended labs and tests    Follow up with primary care provider, Physician No Ref-Primary, within 7 days for hospital follow- up.      Opthalmology in 2 weeks       Appointments on [place] and/or [facility] (with [facility] or [facility] provider or service). Call [phone] if you haven't heard regarding these appointments within 7 days of discharge.     Diet    Follow this diet upon discharge: as tolerated       Significant Results and Procedures   Most Recent 3 CBC's:  Recent Labs   Lab Test 05/25/24  1548   WBC 9.3   HGB 14.0   MCV 93        Most Recent 3 BMP's:  Recent Labs   Lab Test 05/27/24  0752 05/27/24  0222 05/26/24  2251 05/25/24  2029 05/25/24  1548   NA   --   --   --   --  139   POTASSIUM  --   --   --   --  3.9   CHLORIDE  --   --   --   --  102   CO2  --   --   --   --  26   BUN  --   --   --   --  12.1   CR  --   --   --   --  0.98*   ANIONGAP  --   --   --   --  11   YESY  --   --   --   --  9.7   * 172* 136*   < > 140*    < > = values in this interval not displayed.     Most Recent 2 LFT's:No lab results found.  Most Recent 3 INR's:  Recent Labs   Lab Test 05/25/24  1548   INR 0.96     Most Recent INR's and Anticoagulation Dosing History:  Anticoagulation Dose History          Latest Ref Rng & Units 5/25/2024   Recent Dosing and Labs   INR 0.85 - 1.15 0.96      7-Day Micro Results       No results found for the last 168 hours.          Most Recent TSH and T4:No lab results found.  Most Recent 6 glucoses:  Recent Labs   Lab Test 05/27/24  0752 05/27/24  0222 05/26/24  2251 05/26/24  1809 05/26/24  1215 05/26/24  0819   * 172* 136* 104* 213* 161*   ,   Results for orders placed or performed during the hospital encounter of 05/25/24   CT Head w/o Contrast    Narrative    EXAM: CTA HEAD NECK W CONTRAST, CT HEAD W/O CONTRAST  LOCATION: [facility]  DATE: 5/25/2024    INDICATION: Code Stroke to evaluate for potential thrombolysis and thrombectomy. Ataxia, dysarthria.  COMPARISON: None.  CONTRAST: 67 mL intravenous Isovue 370  TECHNIQUE: Head and neck CT angiogram with IV contrast. Noncontrast head CT followed by axial helical CT images of the head and neck vessels obtained during the arterial phase of intravenous contrast administration. Axial 2D reconstructed images and   multiplanar 3D MIP reconstructed images of the head and neck vessels were performed by the technologist. Dose reduction techniques were used. All stenosis measurements made according to NASCET criteria unless otherwise specified.    FINDINGS:   NONCONTRAST HEAD CT:   INTRACRANIAL CONTENTS: No intracranial hemorrhage, extraaxial collection,  or mass effect.  No CT evidence of acute infarct. Normal parenchymal attenuation. Normal ventricles and sulci.     VISUALIZED ORBITS/SINUSES/MASTOIDS: No intraorbital abnormality. No paranasal sinus mucosal disease. No middle ear or mastoid effusion.    BONES/SOFT TISSUES: No acute abnormality.    HEAD CTA:  ANTERIOR CIRCULATION: No stenosis/occlusion, aneurysm, or high flow vascular malformation. Standard [place] anatomy.    POSTERIOR CIRCULATION: No stenosis/occlusion, aneurysm, or high flow vascular malformation. Balanced vertebral arteries supply a normal basilar artery.     DURAL VENOUS SINUSES: Expected enhancement of the major dural venous sinuses.    NECK CTA:  RIGHT CAROTID: No measurable stenosis or dissection.    LEFT CAROTID: No measurable stenosis or dissection.    VERTEBRAL ARTERIES: No focal stenosis or dissection. Balanced vertebral arteries.    AORTIC ARCH: Bovine origin left common carotid artery. No significant stenosis at the origin of the great vessels.    NONVASCULAR STRUCTURES: Unremarkable.      Impression    IMPRESSION:   HEAD CT:  1.  Normal head CT.    HEAD CTA:   1.  Normal CTA [place].    NECK CTA:  1.  Normal neck CTA.      These findings were communicated by phone to Dr. Michael on 5/25/2024 4:25 PM CDT.      CTA Head Neck with Contrast    Narrative    EXAM: CTA HEAD NECK W CONTRAST, CT HEAD W/O CONTRAST  LOCATION: [facility]  DATE: 5/25/2024    INDICATION: Code Stroke to evaluate for potential thrombolysis and thrombectomy. Ataxia, dysarthria.  COMPARISON: None.  CONTRAST: 67 mL intravenous Isovue 370  TECHNIQUE: Head and neck CT angiogram with IV contrast. Noncontrast head CT followed by axial helical CT images of the head and neck vessels obtained during the arterial phase of intravenous contrast administration. Axial 2D reconstructed images and   multiplanar 3D MIP reconstructed images of the head and neck vessels  were performed by the technologist. Dose reduction techniques were used. All stenosis measurements made according to NASCET criteria unless otherwise specified.    FINDINGS:   NONCONTRAST HEAD CT:   INTRACRANIAL CONTENTS: No intracranial hemorrhage, extraaxial collection, or mass effect.  No CT evidence of acute infarct. Normal parenchymal attenuation. Normal ventricles and sulci.     VISUALIZED ORBITS/SINUSES/MASTOIDS: No intraorbital abnormality. No paranasal sinus mucosal disease. No middle ear or mastoid effusion.    BONES/SOFT TISSUES: No acute abnormality.    HEAD CTA:  ANTERIOR CIRCULATION: No stenosis/occlusion, aneurysm, or high flow vascular malformation. Standard [place] anatomy.    POSTERIOR CIRCULATION: No stenosis/occlusion, aneurysm, or high flow vascular malformation. Balanced vertebral arteries supply a normal basilar artery.     DURAL VENOUS SINUSES: Expected enhancement of the major dural venous sinuses.    NECK CTA:  RIGHT CAROTID: No measurable stenosis or dissection.    LEFT CAROTID: No measurable stenosis or dissection.    VERTEBRAL ARTERIES: No focal stenosis or dissection. Balanced vertebral arteries.    AORTIC ARCH: Bovine origin left common carotid artery. No significant stenosis at the origin of the great vessels.    NONVASCULAR STRUCTURES: Unremarkable.      Impression    IMPRESSION:   HEAD CT:  1.  Normal head CT.    HEAD CTA:   1.  Normal CTA [place].    NECK CTA:  1.  Normal neck CTA.      These findings were communicated by phone to Dr. Michael on 5/25/2024 4:25 PM CDT.      MR Brain w/o & w Contrast    Narrative    EXAM: MR BRAIN W/O and W CONTRAST  LOCATION: [facility]  DATE: 5/25/2024    INDICATION: dizzines, double vision, left facial sensory change, bilateral arm sensory deficit, right LE sensory deficit, thin cuts through the brainstem and coronal DWI  COMPARISON: CT head 5/25/2024  CONTRAST: 8.6ml  gadavist  TECHNIQUE: Routine multiplanar multisequence head MRI without and with intravenous contrast.    FINDINGS:  INTRACRANIAL CONTENTS: No acute or subacute infarct. No mass, acute hemorrhage, or extra-axial fluid collections. Normal brain parenchymal signal. Normal ventricles and sulci. Normal position of the cerebellar tonsils. No pathologic contrast enhancement.    SELLA: No abnormality accounting for technique.    OSSEOUS STRUCTURES/SOFT TISSUES: Normal marrow signal. The major intracranial vascular flow voids are maintained.     ORBITS: No abnormality accounting for technique.     SINUSES/MASTOIDS: Mild mucosal thickening scattered about the paranasal sinuses. No middle ear or mastoid effusion.       Impression    IMPRESSION:  1.  Normal head MRI.       Discharge Medications   Discharge Medication List as of 5/27/2024 11:24 AM        START taking these medications    Details   acetaminophen (TYLENOL) 325 MG tablet Take 2 tablets (650 mg) by mouth every 4 hours as needed for mild pain or fever, No Print Out      calcium carbonate (TUMS) 500 MG chewable tablet Take 1 tablet (500 mg) by mouth 4 times daily as needed for heartburn, No Print Out      ibuprofen (ADVIL/MOTRIN) 800 MG tablet Take 1 tablet (800 mg) by mouth every 6 hours as needed for inflammatory pain, No Print Out           CONTINUE these medications which have CHANGED    Details   ondansetron (ZOFRAN ODT) 4 MG ODT tab Take 1 tablet (4 mg) by mouth every 8 hours as needed for nausea, Disp-14 tablet, R-0, Local Print           CONTINUE these medications which have NOT CHANGED    Details   amitriptyline (ELAVIL) 100 MG tablet Take 100 mg by mouth at bedtime, Historical      cetirizine (ZYRTEC) 10 MG tablet Take 10 mg by mouth every evening, Historical      estradiol (ESTRACE) 1 MG tablet Take 1 mg by mouth every evening, Historical      HUMALOG 100 UNIT/ML injection MAX DAILY DOSE OF 65 UNITS VIA INSULIN PUMP, ANA, Historical      indomethacin  (INDOCIN) 50 MG capsule Take 100 mg by mouth 2 times daily as needed for moderate pain, Historical      levothyroxine (SYNTHROID/LEVOTHROID) 175 MCG tablet Take 175 mcg by mouth MONDAY TO FRIDAY ONLY, Historical      metroNIDAZOLE (METROGEL) 0.75 % vaginal gel Place vaginally twice a week As neededHistorical           Allergies   Allergies   Allergen Reactions    Aspirin Shortness Of Breath

## 2024-05-27 NOTE — CONSULTS
"CHIEF COMPLAINT / REASON FOR VISIT  Dizziness and nausea    HISTORY OF PRESENT ILLNESS   is a 33 year old female presenting with 2-day history of persistent dizziness and nausea.     She reports frequent transient dizziness at baseline for more than 10 years.  The dizziness is typically triggered by position changes (standing or sitting up), lifting head up from bending down, car ride, train ride, as well as looking at moving objects. These episodes can sometimes associate with nausea/vomiting.     Yesterday, she was with her boyfriend at the [employer] when she developed dizziness, nausea, and headache.  The headache was described as tension in the back of the eyes.  She has diabetes type 1 and she checked her glucose, which was normal.  She is at down and drank some water but the symptoms did not go away.  She then presented to the ED.    Stroke team was initially consulted.  MRI brain did not show acute infarct.  CTA head and neck was normal.  She was then given dexamethasone 10 mg, Dilaudid 0.5 mg x 4, Zofran, and valproate 500 mg IV once. The cocktail helped with headache but the nausea and dizziness have not improved. She also reports blurry vision and double vision that persisted even when she closed one eye. The dizziness is described as spinning sensation as if she is on a carousel. No hearing loss. No tinnitus. No ear pain. No fever. No sick contact.     Past medical history  - diabetes mellitus type 1  - autoimmune hypothyroidism  -     PHYSICAL EXAM  /87 (BP Location: Right arm)   Pulse 84   Temp 97.8  F (36.6  C) (Oral)   Resp 18   Ht 1.626 m (5' 4\")   Wt 86.2 kg (190 lb)   SpO2 99%   BMI 32.61 kg/m      NEUROLOGICAL EXAMINATION  Mental status: normal. Alert and oriented. Able to give full history and follow complex commands.   Cranial nerves: pupils 2 mm equally reactive to light. No ptosis. Visual field intact. Subjective blurry vision.  Full EOM in all directions. No " nystagmus. Normal saccades. No facial weakness. No dysarthria. Normal hearing  Muscle strength: Normal muscle strength 5/5 proximally and distally in upper and lower limbs. Normal tone. No rigidity. No nuchal rigidity.   Sensation: Intact sensation to light touch in upper and lower limbs.  Deep tendon reflexes: Normal reflexes in the upper and lower limbs including intact bilateral ankle reflexes  Coordination: normal rapid alternating movements and finger to nose testing. No dysmetria on heel-to-shin  Gait: Able to take few steps unassisted. Wide based.     ASSESSMENT / PLAN  #1 Peripheral vertigo with headache: suspected vestibular migraine  #2 Persistent postural perceptual dizziness     Ms. Farr presented with 2-day history of vertiginous symptom associated with headache, nausea, vomiting, which is most suggestive of vestibular migraine. She also reports chronic history of dizziness triggered by standing and visual information consistent with PPPD. Neurological exam did not show any sign of cerebellar dysfunction or other signs to suggest central cause of vertigo. MRI did not show intracranial process.     Recommendations:  - Supportive treatment for vestibular migraine   - Compazine 10 mg IV q 8 h   - Benadryl 50 mg IV then benadryl 25 mg q 6 h   - Tylenol prn   - Ibuprofen prn  - IV hydration given poor oral intake  - Continue amitriptyline  - She will benefit from vestibular rehabilitation after discharge.

## 2024-05-28 ENCOUNTER — TELEPHONE (OUTPATIENT)
Dept: OPHTHALMOLOGY | Facility: CLINIC | Age: 34
End: 2024-05-28
Payer: MEDICARE

## 2024-05-28 ENCOUNTER — PATIENT OUTREACH (OUTPATIENT)
Dept: CARE COORDINATION | Facility: CLINIC | Age: 34
End: 2024-05-28
Payer: MEDICARE

## 2024-05-28 NOTE — TELEPHONE ENCOUNTER
Called and spoke to Rufino     Made her an appointment for 6/19 with dr. Aldrich for new low vision     Discussed     Wait time     Billing / insurance     Clinic address     Parking     Provided the clinic address     Meseret Mitchell Communication Facilitator on 5/28/2024 at 9:39 AM

## 2024-05-28 NOTE — TELEPHONE ENCOUNTER
This patient was seen for diplopia and found to have convergence excess with normal head imaging. Can she have general eye clinic follow up with sensorimotor examination in 4 weeks?     ---    Above per on call eye provider.    Ok to schedule with Dr. Aldrich or Dr. Wharton in 60 minute new low vision time slot (able to perform sensorimotor exam at visit).    Clinic to reach out to review scheduling options.    Angel Kimble RN 7:48 AM 05/28/24

## 2024-05-28 NOTE — PROGRESS NOTES
"Clinic Care Coordination Contact  Transitions of Care Outreach  Chief Complaint   Patient presents with    Clinic Care Coordination - Post Hospital       Most Recent Admission Date: 5/25/2024   Most Recent Admission Diagnosis: Complicated migraine, intractable - G43.919     Most Recent Discharge Date: 5/27/2024   Most Recent Discharge Diagnosis: Complicated migraine, intractable - G43.919  Gastroesophageal reflux disease without esophagitis - K21.9     Transitions of Care Assessment    Discharge Assessment  How are you doing now that you are home?: \" Doing well \"  How are your symptoms? (Red Flag symptoms escalate to triage hotline per guidelines): Improved  Do you know how to contact your clinic care team if you have future questions or changes to your health status? : Yes  Does the patient have their discharge instructions? : Yes  Does the patient have questions regarding their discharge instructions? : No  Were you started on any new medications or were there changes to any of your previous medications? : Yes  Does the patient have all of their medications?: Yes  Do you have questions regarding any of your medications? : No  Do you have all of your needed medical supplies or equipment (DME)?  (i.e. oxygen tank, CPAP, cane, etc.): Yes    Post-op ([facility] CTA Only)  If the patient had a surgery or procedure, do they have any questions for a nurse?: No         [facility] Explained and offered Care Coordination support to eligible patients: No    Patient accepted? No    Follow up Plan     Discharge Follow-Up  Discharge follow up appointment scheduled in alignment with recommended follow up timeframe or Transitions of Risk Category? (Low = within 30 days; Moderate= within 14 days; High= within 7 days): Yes  Discharge Follow Up Appointment Date: 06/19/24  Discharge Follow Up Appointment Scheduled with?: Specialty Care Provider    Future Appointments   Date Time Provider Department Center   6/19/2024  9:40 AM Michael Aldrich, " [facility]       Outpatient Plan as outlined on AVS reviewed with patient.    For any urgent concerns, please contact our 24 hour nurse triage line: [phone] ([phone])       Urvashi Perez MA

## 2024-06-14 ENCOUNTER — TELEPHONE (OUTPATIENT)
Dept: OPHTHALMOLOGY | Facility: CLINIC | Age: 34
End: 2024-06-14
Payer: MEDICARE

## 2024-06-19 ENCOUNTER — OFFICE VISIT (OUTPATIENT)
Dept: OPHTHALMOLOGY | Facility: CLINIC | Age: 34
End: 2024-06-19
Payer: MEDICARE

## 2024-06-19 DIAGNOSIS — H52.4 ACCOMMODATIVE INSUFFICIENCY: Primary | ICD-10-CM

## 2024-06-19 PROCEDURE — 92012 INTRM OPH EXAM EST PATIENT: CPT | Performed by: OPTOMETRIST

## 2024-06-19 PROCEDURE — 92015 DETERMINE REFRACTIVE STATE: CPT | Mod: GY | Performed by: OPTOMETRIST

## 2024-06-19 ASSESSMENT — EXTERNAL EXAM - LEFT EYE: OS_EXAM: NORMAL

## 2024-06-19 ASSESSMENT — TONOMETRY
IOP_METHOD: TONOPEN
OS_IOP_MMHG: 16
OD_IOP_MMHG: 19

## 2024-06-19 ASSESSMENT — REFRACTION_MANIFEST
OD_SPHERE: -0.50
OS_ADD: +1.00
OS_AXIS: 080
OS_SPHERE: -0.50
OS_CYLINDER: +0.25
OD_CYLINDER: SPHERE
OD_ADD: +1.00

## 2024-06-19 ASSESSMENT — CONF VISUAL FIELD
OS_INFERIOR_NASAL_RESTRICTION: 0
OD_SUPERIOR_TEMPORAL_RESTRICTION: 0
OD_NORMAL: 1
OS_SUPERIOR_TEMPORAL_RESTRICTION: 0
OS_SUPERIOR_NASAL_RESTRICTION: 0
OD_INFERIOR_NASAL_RESTRICTION: 0
OD_SUPERIOR_NASAL_RESTRICTION: 0
OS_NORMAL: 1
OS_INFERIOR_TEMPORAL_RESTRICTION: 0
OD_INFERIOR_TEMPORAL_RESTRICTION: 0

## 2024-06-19 ASSESSMENT — SLIT LAMP EXAM - LIDS
COMMENTS: NORMAL
COMMENTS: NORMAL

## 2024-06-19 ASSESSMENT — VISUAL ACUITY
METHOD_MR_RETINOSCOPY: 1
OD_SC+: +2
OD_SC: 20/25
METHOD: SNELLEN - LINEAR
OS_SC: 20/25
OS_SC+: +1

## 2024-06-19 ASSESSMENT — CUP TO DISC RATIO
OD_RATIO: 0.2
OS_RATIO: 0.2

## 2024-06-19 ASSESSMENT — EXTERNAL EXAM - RIGHT EYE: OD_EXAM: NORMAL

## 2024-06-19 NOTE — PROGRESS NOTES
Assessment/Plan  (H52.4) Accommodative insufficiency  (primary encounter diagnosis)  Comment: Most likely source of fluctuating vision/difficulty focusing.   Plan: REFRACTION [[id number]]        Discussed findings with patient. Suspect that a bifocal or progressive lens design will improve focusing difficulties. Dizziness and visual disturbances sound more likely related to hypotension given otherwise normal recent ocular health exam. Recommend patient gradually adjust to full time glasses and return to clinic if no relief is noted after a couple of weeks with her new glasses.     Complete documentation of historical and exam elements from today's encounter can  be found in the full encounter summary report (not reduplicated in this progress  note). I personally obtained the chief complaint(s) and history of present illness. I  confirmed and edited as necessary the review of systems, past medical/surgical  history, family history, social history, and examination findings as documented by  others; and I examined the patient myself. I personally reviewed the relevant tests,  images, and reports as documented above. I formulated and edited as necessary the  assessment and plan and discussed the findings and management plan with the  patient and family.    Michael Aldrich, OD

## 2024-06-19 NOTE — NURSING NOTE
"Chief Complaints and History of Present Illnesses   Patient presents with    Blurred Vision Evaluation     Chief Complaint(s) and History of Present Illness(es)       Blurred Vision Evaluation              Laterality: both eyes    Onset: gradual    Location: central vision and peripheral vision    Quality: blurred vision    Context: Notices dizziness gets worse when in a crouched position too long or when reading for awhile, more difficult to focus up close    Associated symptoms: double vision (mild, not constant, with dizziness), headache (couple times per week, middle behind the eyes), nausea, photophobia and flashes (sees \"sparkles\").  Negative for eye pain and floaters    Treatments tried: no treatments    Pain scale: 0/10              Comments    Patient states blurry vision has been on and off, improving since being in the hospital.    Ann-Marie Livingston on 6/19/2024, at 9:47 AM                     "

## 2024-07-12 ENCOUNTER — OFFICE VISIT (OUTPATIENT)
Dept: INTERNAL MEDICINE | Facility: CLINIC | Age: 34
End: 2024-07-12
Payer: MEDICARE

## 2024-07-12 VITALS
DIASTOLIC BLOOD PRESSURE: 84 MMHG | HEART RATE: 94 BPM | OXYGEN SATURATION: 100 % | HEIGHT: 65 IN | SYSTOLIC BLOOD PRESSURE: 130 MMHG | TEMPERATURE: 97.7 F | BODY MASS INDEX: 30.82 KG/M2 | RESPIRATION RATE: 18 BRPM | WEIGHT: 185 LBS

## 2024-07-12 DIAGNOSIS — R00.2 PALPITATIONS: ICD-10-CM

## 2024-07-12 DIAGNOSIS — M79.10 MUSCLE PAIN: ICD-10-CM

## 2024-07-12 DIAGNOSIS — F41.9 ANXIETY AND DEPRESSION: ICD-10-CM

## 2024-07-12 DIAGNOSIS — R10.9 GASTRIC PAIN: ICD-10-CM

## 2024-07-12 DIAGNOSIS — Z00.00 ENCOUNTER FOR MEDICARE ANNUAL WELLNESS EXAM: Primary | ICD-10-CM

## 2024-07-12 DIAGNOSIS — Z78.0 MENOPAUSE PRESENT: ICD-10-CM

## 2024-07-12 DIAGNOSIS — Z11.59 NEED FOR HEPATITIS C SCREENING TEST: ICD-10-CM

## 2024-07-12 DIAGNOSIS — E13.9 DIABETES 1.5, MANAGED AS TYPE 1 (H): ICD-10-CM

## 2024-07-12 DIAGNOSIS — Z11.4 SCREENING FOR HIV (HUMAN IMMUNODEFICIENCY VIRUS): ICD-10-CM

## 2024-07-12 DIAGNOSIS — F32.A ANXIETY AND DEPRESSION: ICD-10-CM

## 2024-07-12 DIAGNOSIS — R11.2 NAUSEA AND VOMITING, UNSPECIFIED VOMITING TYPE: ICD-10-CM

## 2024-07-12 DIAGNOSIS — Z12.4 CERVICAL CANCER SCREENING: ICD-10-CM

## 2024-07-12 DIAGNOSIS — Z23 NEED FOR PROPHYLACTIC VACCINATION AGAINST HEPATITIS B VIRUS: ICD-10-CM

## 2024-07-12 DIAGNOSIS — E06.3 HASHIMOTO'S THYROIDITIS: ICD-10-CM

## 2024-07-12 LAB — HBA1C MFR BLD: 7.7 % (ref 0–5.6)

## 2024-07-12 PROCEDURE — 36415 COLL VENOUS BLD VENIPUNCTURE: CPT

## 2024-07-12 PROCEDURE — 99214 OFFICE O/P EST MOD 30 MIN: CPT | Mod: 25

## 2024-07-12 PROCEDURE — 93242 EXT ECG>48HR<7D RECORDING: CPT

## 2024-07-12 PROCEDURE — 84443 ASSAY THYROID STIM HORMONE: CPT

## 2024-07-12 PROCEDURE — G0402 INITIAL PREVENTIVE EXAM: HCPCS

## 2024-07-12 PROCEDURE — 83036 HEMOGLOBIN GLYCOSYLATED A1C: CPT

## 2024-07-12 PROCEDURE — 84439 ASSAY OF FREE THYROXINE: CPT

## 2024-07-12 PROCEDURE — 80053 COMPREHEN METABOLIC PANEL: CPT

## 2024-07-12 RX ORDER — ESTRADIOL 1 MG/1
1 TABLET ORAL EVERY EVENING
Qty: 30 TABLET | Refills: 3 | Status: SHIPPED | OUTPATIENT
Start: 2024-07-12

## 2024-07-12 RX ORDER — INSULIN LISPRO 100 [IU]/ML
1 INJECTION, SOLUTION INTRAVENOUS; SUBCUTANEOUS
Qty: 10 ML | Refills: 3 | Status: SHIPPED | OUTPATIENT
Start: 2024-07-12

## 2024-07-12 RX ORDER — AMITRIPTYLINE HYDROCHLORIDE 100 MG/1
100 TABLET ORAL AT BEDTIME
Qty: 30 TABLET | Refills: 3 | Status: SHIPPED | OUTPATIENT
Start: 2024-07-12

## 2024-07-12 SDOH — HEALTH STABILITY: PHYSICAL HEALTH: ON AVERAGE, HOW MANY DAYS PER WEEK DO YOU ENGAGE IN MODERATE TO STRENUOUS EXERCISE (LIKE A BRISK WALK)?: 3 DAYS

## 2024-07-12 SDOH — HEALTH STABILITY: PHYSICAL HEALTH: ON AVERAGE, HOW MANY MINUTES DO YOU ENGAGE IN EXERCISE AT THIS LEVEL?: 20 MIN

## 2024-07-12 ASSESSMENT — SOCIAL DETERMINANTS OF HEALTH (SDOH): HOW OFTEN DO YOU GET TOGETHER WITH FRIENDS OR RELATIVES?: ONCE A WEEK

## 2024-07-12 ASSESSMENT — PATIENT HEALTH QUESTIONNAIRE - PHQ9
SUM OF ALL RESPONSES TO PHQ QUESTIONS 1-9: 9
SUM OF ALL RESPONSES TO PHQ QUESTIONS 1-9: 9
10. IF YOU CHECKED OFF ANY PROBLEMS, HOW DIFFICULT HAVE THESE PROBLEMS MADE IT FOR YOU TO DO YOUR WORK, TAKE CARE OF THINGS AT HOME, OR GET ALONG WITH OTHER PEOPLE: VERY DIFFICULT

## 2024-07-12 ASSESSMENT — PAIN SCALES - GENERAL: PAINLEVEL: NO PAIN (0)

## 2024-07-12 NOTE — PROGRESS NOTES
Rufino Farr arrived here on 7/12/2024 3:25 PM for 3-7 Days  Zio monitor placement per ordering provider Sofia Anne for the diagnosis palpitations.  Patient s skin was prepped per protocol. Dr. Anne is the supervising MD.  Zio monitor was placed.  Instructions were reviewed with and given to the patient.  Patient verbalized understanding of wear, troubleshooting and monitor return instructions.  Marie Maya CMA

## 2024-07-12 NOTE — PROGRESS NOTES
Preventive Care Visit  [facility] FORTUNATO Velez CNP, Internal Medicine  Jul 12, 2024      Assessment & Plan     (Z00.00) Encounter for Medicare annual wellness exam  (primary encounter diagnosis)  Comment: Patient was seen today for wellness exam. Patient recently moved here from [place] and had multiple concerns to address as they have extensive medical history and need for specialty providers. Patient is having issues with trying to get CGM and insulin discussed primary care coordinator  referral as well as diabetic educator referral. Discussed labs and medication refills.   Plan: REVIEW OF HEALTH MAINTENANCE PROTOCOL ORDERS,         Comprehensive metabolic panel (BMP + Alb, Alk         Phos, ALT, AST, Total. Bili, TP)        Pending     (Z23) Need for prophylactic vaccination against hepatitis B virus  Comment: Discussed recommendation to screen   Plan: Patient declined     (Z11.4) Screening for HIV (human immunodeficiency virus)  Comment: Discussed recommendation to screen   Plan: Patient has gotten tested recently in [place].     (Z11.59) Need for hepatitis C screening test  Comment:  Discussed recommendation to screen   Plan:     (Z12.4) Cervical cancer screening  Comment: No longer has cervix had complete hysterectomy.   Plan: No need for PAP     (E06.3) Hashimoto's thyroiditis  Comment: Chronic. 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 Discussed labs, referrals and medication refills.   Plan: Adult Diabetes Education  Referral,         Primary Care - Care Coordination Referral,   Future         Hemoglobin A1c,  Pending      Adult Endocrinology          Referral, HUMALOG 100 UNIT/ML injection        Future     (R10.9) Gastric pain  Comment: Chronic, Has  "history of gastric issues with nausea and vomitting also slow gastric emptying, concerned for gastro paresis due to diabetes  Plan: Adult GI  Referral - Consult Only        Future     (R11.2) Nausea and vomiting, unspecified vomiting type  Comment: Has history of gastric issues with nausea and vomitting also slow gastric emptying, concerned for gastro paresis due to diabetes  Plan: Adult GI  Referral - Consult Only        Future     (Z78.0) Menopause present  Comment:  No longer has cervix had complete hysterectomy. Discussed medication refill   Plan: estradiol (ESTRACE) 1 MG tablet        Prescription sent to pharmacy     (F41.9,  F32.A) Anxiety and depression  Comment: Chronic.  Discussed medication refill   Plan: amitriptyline (ELAVIL) 100 MG tablet          Prescription sent to pharmacy     (M79.10) Muscle pain  Comment: Chronic.  Discussed medication refill   Plan: tiZANidine (ZANAFLEX) 4 MG tablet          Prescription sent to pharmacy     (R00.2) Palpitations  Comment: Chronic. Discussed applying Zio patch.  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 Patient is administering insulin with an insulin pump daily she is checking blood sugars 6-8 times a day on average and more if needed. The patients insulin treatment regimen requires frequent adjustments based of of her BGM/CGM    Patient has been advised of split billing requirements and indicates understanding: Yes        BMI  Estimated body mass index is 31.2 kg/m  as calculated from the following:    Height as of this encounter: 1.64 m (5' 4.57\").    Weight as of this encounter: 83.9 kg (185 lb).       Counseling  Appropriate preventive services were discussed with " this patient, including applicable screening as appropriate for fall prevention, nutrition, physical activity, Tobacco-use cessation, weight loss and cognition.  Checklist reviewing preventive services available has been given to the patient.  Reviewed patient's diet, addressing concerns and/or questions.   She is at risk for lack of exercise and has been provided with information to increase physical activity for the benefit of her well-being.   The patient was instructed to see the dentist every 6 months.   Discussed possible causes of fatigue. Updated plan of care.  Patient reported difficulty with activities of daily living were addressed today.The patient was provided with written information regarding signs of hearing loss.   The patient's PHQ-9 score is consistent with mild depression. She was provided with information regarding depression.       CONSULTATION/REFERRAL to Endocrine,  GI,     Subjective   Rufino Nelisamir is a 33 year old, presenting for the following:  Medicare Visit and Establish Care        7/12/2024     1:57 PM   Additional Questions   Roomed by Marie Maya        Health Care Directive  Patient does not have a Health Care Directive or Living Will: Discussed advance care planning with patient; however, patient declined at this time.    HPI  Physical        7/12/2024   General Health   How would you rate your overall physical health? (!) FAIR   Feel stress (tense, anxious, or unable to sleep) Rather much      (!) STRESS CONCERN      7/12/2024   Nutrition   Diet: Diabetic            7/12/2024   Exercise   Days per week of moderate/strenous exercise 3 days   Average minutes spent exercising at this level 20 min            7/12/2024   Social Factors   Frequency of gathering with friends or relatives Once a week   Worry food won't last until get money to buy more Yes   Food not last or not have enough money for food? Yes   Do you have housing? (Housing is defined as stable permanent housing and  does not include staying ouside in a car, in a tent, in an abandoned building, in an overnight shelter, or couch-surfing.) Yes   Are you worried about losing your housing? No   Lack of transportation? No   Unable to get utilities (heat,electricity)? No      (!) FOOD SECURITY CONCERN PRESENT      7/12/2024   Fall Risk   Fallen 2 or more times in the past year? No   Trouble with walking or balance? No             7/12/2024   Activities of Daily Living- Home Safety   Needs help with the following daily activites Shopping    Preparing meals    Housework    Laundry   Safety concerns in the home None of the above       Multiple values from one day are sorted in reverse-chronological order         7/12/2024   Dental   Dentist two times every year? (!) NO            7/12/2024   Hearing Screening   Hearing concerns? (!) IT'S HARD TO FOLLOW A CONVERSATION IN A NOISY RESTAURANT OR CROWDED ROOM.            7/12/2024   Driving Risk Screening   Patient/family members have concerns about driving No            7/12/2024   General Alertness/Fatigue Screening   Have you been more tired than usual lately? (!) YES            7/12/2024   Urinary Incontinence Screening   Bothered by leaking urine in past 6 months No            7/12/2024   TB Screening   Were you born outside of the [place]? No          Today's PHQ-9 Score:       7/12/2024    10:40 AM   PHQ-9 SCORE   PHQ-9 Total Score MyChart 9 (Mild depression)   PHQ-9 Total Score 9         7/12/2024   Substance Use   Alcohol more than 3/day or more than 7/wk Not Applicable   Do you have a current opioid prescription? No   How severe/bad is pain from 1 to 10? 6/10   Do you use any other substances recreationally? (!) CANNABIS PRODUCTS        Social History     Tobacco Use    Smoking status: Former     Types: Cigarettes     Passive exposure: Past    Smokeless tobacco: Current    Tobacco comments:     Vapes   Vaping Use    Vaping status: Every Day    Substances: Flavoring    Devices: Disposable    Substance Use Topics    Alcohol use: Not Currently    Drug use: Yes     Types: Marijuana        Mammogram Screening - Patient under 40 years of age: Routine Mammogram Screening not recommended.           7/12/2024   One time HIV Screening   Previous HIV test? Yes        History of abnormal Pap smear: Status post hysterectomy with removal of cervix and no history of CIN2 or greater or cervical cancer. Health Maintenance and Surgical History updated.             Reviewed and updated as needed this visit by Provider                    Lab work is in process  Current providers sharing in care for this patient include:  Patient Care Team:  No Ref-Primary, Physician as PCP - General    The following health maintenance items are reviewed in Epic and correct as of today:  Health Maintenance   Topic Date Due    LIPID  Never done    MICROALBUMIN  Never done    TSH W/FREE T4 REFLEX  Never done    DIABETIC FOOT EXAM  Never done    Pneumococcal Vaccine: Pediatrics (0 to 5 Years) and At-Risk Patients (6 to 64 Years) (1 of 2 - PCV) Never done    HIV SCREENING  Never done    HEPATITIS C SCREENING  Never done    HEPATITIS B IMMUNIZATION (1 of 3 - 19+ 3-dose series) Never done    PAP  Never done    HPV IMMUNIZATION (2 - 3-dose series) 02/19/2010    COVID-19 Vaccine (1 - 2023-24 season) Never done    INFLUENZA VACCINE (1) 09/01/2024    A1C  10/12/2024    BMP  05/25/2025    EYE EXAM  06/19/2025    MEDICARE ANNUAL WELLNESS VISIT  07/12/2025    ANNUAL REVIEW OF HM ORDERS  07/12/2025    ADVANCE CARE PLANNING  07/12/2029    DTAP/TDAP/TD IMMUNIZATION (3 - Td or Tdap) 05/10/2034    PHQ-2 (once per calendar year)  Completed    IPV IMMUNIZATION  Aged Out    MENINGITIS IMMUNIZATION  Aged Out    RSV MONOCLONAL ANTIBODY  Aged Out         Review of Systems  Constitutional, HEENT, cardiovascular, pulmonary, gi and gu systems are negative, except as otherwise noted.     Objective    Exam  /84 (BP Location: Right arm, Patient Position: Chair,  "Cuff Size: Adult Regular)   Pulse 94   Temp 97.7  F (36.5  C) (Temporal)   Resp 18   Ht 1.64 m (5' 4.57\")   Wt 83.9 kg (185 lb)   SpO2 100%   BMI 31.20 kg/m     Estimated body mass index is 31.2 kg/m  as calculated from the following:    Height as of this encounter: 1.64 m (5' 4.57\").    Weight as of this encounter: 83.9 kg (185 lb).    Physical Exam  GENERAL: alert and no distress  EYES: Eyes grossly normal to inspection, PERRL and conjunctivae and sclerae normal  HENT: ear canals and TM's normal, nose and mouth without ulcers or lesions  NECK: no adenopathy, no asymmetry, masses, or scars  RESP: lungs clear to auscultation - no rales, rhonchi or wheezes  CV: regular rate and rhythm, normal S1 S2, no S3 or S4, no murmur, click or rub, no peripheral edema  ABDOMEN: soft, tender on palpation, no hepatosplenomegaly, no masses and bowel sounds normal  MS: no gross musculoskeletal defects noted, no edema  SKIN: no suspicious lesions or rashes  NEURO: Normal strength and tone, mentation intact and speech normal  PSYCH: mentation appears normal, affect normal/bright        7/12/2024   Mini Cog   Clock Draw Score 2 Normal   3 Item Recall 2 objects recalled   Mini Cog Total Score 4          Vision Screen  Reason Vision Screen Not Completed: Patient had exam in last 12 months    {Signed Electronically by: FORTUNATO Mann CNP  Answers submitted by the patient for this visit:  Patient Health Questionnaire (Submitted on 7/12/2024)  If you checked off any problems, how difficult have these problems made it for you to do your work, take care of things at home, or get along with other people?: Very difficult  PHQ9 TOTAL SCORE: 9    "

## 2024-07-12 NOTE — PATIENT INSTRUCTIONS
Patient Education   Preventive Care Advice   This is general advice given by our system to help you stay healthy. However, your care team may have specific advice just for you. Please talk to your care team about your preventive care needs.  Nutrition  Eat 5 or more servings of fruits and vegetables each day.  Try wheat bread, brown rice and whole grain pasta (instead of white bread, rice, and pasta).  Get enough calcium and vitamin D. Check the label on foods and aim for 100% of the RDA (recommended daily allowance).  Lifestyle  Exercise at least 150 minutes each week  (30 minutes a day, 5 days a week).  Do muscle strengthening activities 2 days a week. These help control your weight and prevent disease.  No smoking.  Wear sunscreen to prevent skin cancer.  Have a dental exam and cleaning every 6 months.  Yearly exams  See your health care team every year to talk about:  Any changes in your health.  Any medicines your care team has prescribed.  Preventive care, family planning, and ways to prevent chronic diseases.  Shots (vaccines)   HPV shots (up to age 26), if you've never had them before.  Hepatitis B shots (up to age 59), if you've never had them before.  COVID-19 shot: Get this shot when it's due.  Flu shot: Get a flu shot every year.  Tetanus shot: Get a tetanus shot every 10 years.  Pneumococcal, hepatitis A, and RSV shots: Ask your care team if you need these based on your risk.  Shingles shot (for age 50 and up)  General health tests  Diabetes screening:  Starting at age 35, Get screened for diabetes at least every 3 years.  If you are younger than age 35, ask your care team if you should be screened for diabetes.  Cholesterol test: At age 39, start having a cholesterol test every 5 years, or more often if advised.  Bone density scan (DEXA): At age 50, ask your care team if you should have this scan for osteoporosis (brittle bones).  Hepatitis C: Get tested at least once in your life.  STIs (sexually  transmitted infections)  Before age 24: Ask your care team if you should be screened for STIs.  After age 24: Get screened for STIs if you're at risk. You are at risk for STIs (including HIV) if:  You are sexually active with more than one person.  You don't use condoms every time.  You or a partner was diagnosed with a sexually transmitted infection.  If you are at risk for HIV, ask about PrEP medicine to prevent HIV.  Get tested for HIV at least once in your life, whether you are at risk for HIV or not.  Cancer screening tests  Cervical cancer screening: If you have a cervix, begin getting regular cervical cancer screening tests starting at age 21.  Breast cancer scan (mammogram): If you've ever had breasts, begin having regular mammograms starting at age 40. This is a scan to check for breast cancer.  Colon cancer screening: It is important to start screening for colon cancer at age 45.  Have a colonoscopy test every 10 years (or more often if you're at risk) Or, ask your provider about stool tests like a FIT test every year or Cologuard test every 3 years.  To learn more about your testing options, visit:   .  For help making a decision, visit:   https://bit.ly/dh66341.  Prostate cancer screening test: If you have a prostate, ask your care team if a prostate cancer screening test (PSA) at age 55 is right for you.  Lung cancer screening: If you are a current or former smoker ages 50 to 80, ask your care team if ongoing lung cancer screenings are right for you.  For informational purposes only. Not to replace the advice of your health care provider. Copyright   2023 [facility] [employer]. All rights reserved. Clinically reviewed by the [facility] Transitions Program. [employer] 248396 - REV 01/24.  Hearing Loss: Care Instructions  Overview     Hearing loss is a sudden or slow decrease in how well you hear. It can range from slight to profound. Permanent hearing loss can occur with aging. It also can  happen when you are exposed long-term to loud noise. Examples include listening to loud music, riding motorcycles, or being around other loud machines.  Hearing loss can affect your work and home life. It can make you feel lonely or depressed. You may feel that you have lost your independence. But hearing aids and other devices can help you hear better and feel connected to others.  Follow-up care is a key part of your treatment and safety. Be sure to make and go to all appointments, and call your doctor if you are having problems. It's also a good idea to know your test results and keep a list of the medicines you take.  How can you care for yourself at home?  Avoid loud noises whenever possible. This helps keep your hearing from getting worse.  Always wear hearing protection around loud noises.  Wear a hearing aid as directed.  A professional can help you pick a hearing aid that will work best for you.  You can also get hearing aids over the counter for mild to moderate hearing loss.  Have hearing tests as your doctor suggests. They can show whether your hearing has changed. Your hearing aid may need to be adjusted.  Use other devices as needed. These may include:  Telephone amplifiers and hearing aids that can connect to a television, stereo, radio, or microphone.  Devices that use lights or vibrations. These alert you to the doorbell, a ringing telephone, or a baby monitor.  Television closed-captioning. This shows the words at the bottom of the screen. Most new TVs can do this.  TTY (text telephone). This lets you type messages back and forth on the telephone instead of talking or listening. These devices are also called TDD. When messages are typed on the keyboard, they are sent over the phone line to a receiving TTY. The message is shown on a monitor.  Use text messaging, social media, and email if it is hard for you to communicate by telephone.  Try to learn a listening technique called speechreading. It is  "not lipreading. You pay attention to people's gestures, expressions, posture, and tone of voice. These clues can help you understand what a person is saying. Face the person you are talking to, and have them face you. Make sure the lighting is good. You need to see the other person's face clearly.  Think about counseling if you need help to adjust to your hearing loss.  When should you call for help?  Watch closely for changes in your health, and be sure to contact your doctor if:    You think your hearing is getting worse.     You have new symptoms, such as dizziness or nausea.   Where can you learn more?  Go to https://www.[employer].net/patiented  Enter R798 in the search box to learn more about \"Hearing Loss: Care Instructions.\"  Current as of: September 27, 2023               Content Version: 14.0    2780-4304 [employer].   Care instructions adapted under license by your healthcare professional. If you have questions about a medical condition or this instruction, always ask your healthcare professional. [employer] disclaims any warranty or liability for your use of this information.      Learning About Stress  What is stress?     Stress is your body's response to a hard situation. Your body can have a physical, emotional, or mental response. Stress is a fact of life for most people, and it affects everyone differently. What causes stress for you may not be stressful for someone else.  A lot of things can cause stress. You may feel stress when you go on a job interview, take a test, or run a race. This kind of short-term stress is normal and even useful. It can help you if you need to work hard or react quickly. For example, stress can help you finish an important job on time.  Long-term stress is caused by ongoing stressful situations or events. Examples of long-term stress include long-term health problems, ongoing problems at work, or conflicts in your family. Long-term stress can " harm your health.  How does stress affect your health?  When you are stressed, your body responds as though you are in danger. It makes hormones that speed up your heart, make you breathe faster, and give you a burst of energy. This is called the fight-or-flight stress response. If the stress is over quickly, your body goes back to normal and no harm is done.  But if stress happens too often or lasts too long, it can have bad effects. Long-term stress can make you more likely to get sick, and it can make symptoms of some diseases worse. If you tense up when you are stressed, you may develop neck, shoulder, or low back pain. Stress is linked to high blood pressure and heart disease.  Stress also harms your emotional health. It can make you mckay, tense, or depressed. Your relationships may suffer, and you may not do well at work or school.  What can you do to manage stress?  You can try these things to help manage stress:   Do something active. Exercise or activity can help reduce stress. Walking is a great way to get started. Even everyday activities such as housecleaning or yard work can help.  Try yoga or marie chi. These techniques combine exercise and meditation. You may need some training at first to learn them.  Do something you enjoy. For example, listen to music or go to a movie. Practice your hobby or do volunteer work.  Meditate. This can help you relax, because you are not worrying about what happened before or what may happen in the future.  Do guided imagery. Imagine yourself in any setting that helps you feel calm. You can use online videos, books, or a teacher to guide you.  Do breathing exercises. For example:  From a standing position, bend forward from the waist with your knees slightly bent. Let your arms dangle close to the floor.  Breathe in slowly and deeply as you return to a standing position. Roll up slowly and lift your head last.  Hold your breath for just a few seconds in the standing  "position.  Breathe out slowly and bend forward from the waist.  Let your feelings out. Talk, laugh, cry, and express anger when you need to. Talking with supportive friends or family, a counselor, or a wu leader about your feelings is a healthy way to relieve stress. Avoid discussing your feelings with people who make you feel worse.  Write. It may help to write about things that are bothering you. This helps you find out how much stress you feel and what is causing it. When you know this, you can find better ways to cope.  What can you do to prevent stress?  You might try some of these things to help prevent stress:  Manage your time. This helps you find time to do the things you want and need to do.  Get enough sleep. Your body recovers from the stresses of the day while you are sleeping.  Get support. Your family, friends, and community can make a difference in how you experience stress.  Limit your news feed. Avoid or limit time on social media or news that may make you feel stressed.  Do something active. Exercise or activity can help reduce stress. Walking is a great way to get started.  Where can you learn more?  Go to https://www.[employer]net/patiented  Enter N032 in the search box to learn more about \"Learning About Stress.\"  Current as of: October 24, 2023               Content Version: 14.0    3173-0109 [employer].   Care instructions adapted under license by your healthcare professional. If you have questions about a medical condition or this instruction, always ask your healthcare professional. [employer] disclaims any warranty or liability for your use of this information.      Learning About Sleeping Well  What does sleeping well mean?     Sleeping well means getting enough sleep to feel good and stay healthy. How much sleep is enough varies among people.  The number of hours you sleep and how you feel when you wake up are both important. If you do not feel refreshed, " you probably need more sleep. Another sign of not getting enough sleep is feeling tired during the day.  Experts recommend that adults get at least 7 or more hours of sleep per day. Children and older adults need more sleep.  Why is getting enough sleep important?  Getting enough quality sleep is a basic part of good health. When your sleep suffers, your physical health, mood, and your thoughts can suffer too. You may find yourself feeling more grumpy or stressed. Not getting enough sleep also can lead to serious problems, including injury, accidents, anxiety, and depression.  What might cause poor sleeping?  Many things can cause sleep problems, including:  Changes to your sleep schedule.  Stress. Stress can be caused by fear about a single event, such as giving a speech. Or you may have ongoing stress, such as worry about work or school.  Depression, anxiety, and other mental or emotional conditions.  Changes in your sleep habits or surroundings. This includes changes that happen where you sleep, such as noise, light, or sleeping in a different bed. It also includes changes in your sleep pattern, such as having jet lag or working a late shift.  Health problems, such as pain, breathing problems, and restless legs syndrome.  Lack of regular exercise.  Using alcohol, nicotine, or caffeine before bed.  How can you help yourself?  Here are some tips that may help you sleep more soundly and wake up feeling more refreshed.  Your sleeping area   Use your bedroom only for sleeping and sex. A bit of light reading may help you fall asleep. But if it doesn't, do your reading elsewhere in the house. Try not to use your TV, computer, smartphone, or tablet while you are in bed.  Be sure your bed is big enough to stretch out comfortably, especially if you have a sleep partner.  Keep your bedroom quiet, dark, and cool. Use curtains, blinds, or a sleep mask to block out light. To block out noise, use earplugs, soothing music, or a  "\"white noise\" machine.  Your evening and bedtime routine   Create a relaxing bedtime routine. You might want to take a warm shower or bath, or listen to soothing music.  Go to bed at the same time every night. And get up at the same time every morning, even if you feel tired.  What to avoid   Limit caffeine (coffee, tea, caffeinated sodas) during the day, and don't have any for at least 6 hours before bedtime.  Avoid drinking alcohol before bedtime. Alcohol can cause you to wake up more often during the night.  Try not to smoke or use tobacco, especially in the evening. Nicotine can keep you awake.  Limit naps during the day, especially close to bedtime.  Avoid lying in bed awake for too long. If you can't fall asleep or if you wake up in the middle of the night and can't get back to sleep within about 20 minutes, get out of bed and go to another room until you feel sleepy.  Avoid taking medicine right before bed that may keep you awake or make you feel hyper or energized. Your doctor can tell you if your medicine may do this and if you can take it earlier in the day.  If you can't sleep   Imagine yourself in a peaceful, pleasant scene. Focus on the details and feelings of being in a place that is relaxing.  Get up and do a quiet or boring activity until you feel sleepy.  Avoid drinking any liquids before going to bed to help prevent waking up often to use the bathroom.  Where can you learn more?  Go to https://www.[employer]net/patiented  Enter J942 in the search box to learn more about \"Learning About Sleeping Well.\"  Current as of: July 10, 2023               Content Version: 14.0    2422-4457 [employer].   Care instructions adapted under license by your healthcare professional. If you have questions about a medical condition or this instruction, always ask your healthcare professional. [employer] disclaims any warranty or liability for your use of this information.      Learning " About Depression Screening  What is depression screening?  Depression screening is a way to see if you have depression symptoms. It may be done by a doctor or counselor. It's often part of a routine checkup. That's because your mental health is just as important as your physical health.  Depression is a mental health condition that affects how you feel, think, and act. You may:  Have less energy.  Lose interest in your daily activities.  Feel sad and grouchy for a long time.  Depression is very common. It affects people of all ages.  Many things can lead to depression. Some people become depressed after they have a stroke or find out they have a major illness like cancer or heart disease. The death of a loved one or a breakup may lead to depression. It can run in families. Most experts believe that a combination of inherited genes and stressful life events can cause it.  What happens during screening?  You may be asked to fill out a form about your depression symptoms. You and the doctor will discuss your answers. The doctor may ask you more questions to learn more about how you think, act, and feel.  What happens after screening?  If you have symptoms of depression, your doctor will talk to you about your options.  Doctors usually treat depression with medicines or counseling. Often, combining the two works best. Many people don't get help because they think that they'll get over the depression on their own. But people with depression may not get better unless they get treatment.  The cause of depression is not well understood. There may be many factors involved. But if you have depression, it's not your fault.  A serious symptom of depression is thinking about death or suicide. If you or someone you care about talks about this or about feeling hopeless, get help right away.  It's important to know that depression can be treated. Medicine, counseling, and self-care may help.  Where can you learn more?  Go to  "https://www.[employer].net/patiented  Enter T185 in the search box to learn more about \"Learning About Depression Screening.\"  Current as of: June 24, 2023               Content Version: 14.0    4587-3772 [employer].   Care instructions adapted under license by your healthcare professional. If you have questions about a medical condition or this instruction, always ask your healthcare professional. [employer] disclaims any warranty or liability for your use of this information.      Substance Use Disorder: Care Instructions  Overview     You can improve your life and health by stopping your use of alcohol or drugs. When you don't drink or use drugs, you may feel and sleep better. You may get along better with your family, friends, and coworkers. There are medicines and programs that can help with substance use disorder.  How can you care for yourself at home?  Here are some ways to help you stay sober and prevent relapse.  If you have been given medicine to help keep you sober or reduce your cravings, be sure to take it exactly as prescribed.  Talk to your doctor about programs that can help you stop using drugs or drinking alcohol.  Do not keep alcohol or drugs in your home.  Plan ahead. Think about what you'll say if other people ask you to drink or use drugs. Try not to spend time with people who drink or use drugs.  Use the time and money spent on drinking or drugs to do something that's important to you.  Preventing a relapse  Have a plan to deal with relapse. Learn to recognize changes in your thinking that lead you to drink or use drugs. Get help before you start to drink or use drugs again.  Try to stay away from situations, friends, or places that may lead you to drink or use drugs.  If you feel the need to drink alcohol or use drugs again, seek help right away. Call a trusted friend or family member. Some people get support from organizations such as Narcotics Anonymous or SMART " Recovery or from treatment facilities.  If you relapse, get help as soon as you can. Some people make a plan with another person that outlines what they want that person to do for them if they relapse. The plan usually includes how to handle the relapse and who to notify in case of relapse.  Don't give up. Remember that a relapse doesn't mean that you have failed. Use the experience to learn the triggers that lead you to drink or use drugs. Then quit again. Recovery is a lifelong process. Many people have several relapses before they are able to quit for good.  Follow-up care is a key part of your treatment and safety. Be sure to make and go to all appointments, and call your doctor if you are having problems. It's also a good idea to know your test results and keep a list of the medicines you take.  When should you call for help?   Call 911  anytime you think you may need emergency care. For example, call if you or someone else:    Has overdosed or has withdrawal signs. Be sure to tell the emergency workers that you are or someone else is using or trying to quit using drugs. Overdose or withdrawal signs may include:  Losing consciousness.  Seizure.  Seeing or hearing things that aren't there (hallucinations).     Is thinking or talking about suicide or harming others.   Where to get help 24 hours a day, 7 days a week   If you or someone you know talks about suicide, self-harm, a mental health crisis, a substance use crisis, or any other kind of emotional distress, get help right away. You can:    Call the Suicide and Crisis Lifeline at 988.     Call 2-533-023-TALK (1-222.487.7639).     Text HOME to 648576 to access the Crisis Text Line.   Consider saving these numbers in your phone.  Go to [employer] for more information or to chat online.  Call your doctor now or seek immediate medical care if:    You are having withdrawal symptoms. These may include nausea or vomiting, sweating, shakiness, and anxiety.  "  Watch closely for changes in your health, and be sure to contact your doctor if:    You have a relapse.     You need more help or support to stop.   Where can you learn more?  Go to https://www.[employer].net/patiented  Enter H573 in the search box to learn more about \"Substance Use Disorder: Care Instructions.\"  Current as of: November 15, 2023               Content Version: 14.0    7652-6306 [employer].   Care instructions adapted under license by your healthcare professional. If you have questions about a medical condition or this instruction, always ask your healthcare professional. [employer] disclaims any warranty or liability for your use of this information.         "

## 2024-07-13 LAB
ALBUMIN SERPL BCG-MCNC: 4.7 G/DL (ref 3.5–5.2)
ALP SERPL-CCNC: 64 U/L (ref 40–150)
ALT SERPL W P-5'-P-CCNC: 25 U/L (ref 0–50)
ANION GAP SERPL CALCULATED.3IONS-SCNC: 10 MMOL/L (ref 7–15)
AST SERPL W P-5'-P-CCNC: 29 U/L (ref 0–45)
BILIRUB SERPL-MCNC: 0.4 MG/DL
BUN SERPL-MCNC: 13 MG/DL (ref 6–20)
CALCIUM SERPL-MCNC: 9.6 MG/DL (ref 8.6–10)
CHLORIDE SERPL-SCNC: 103 MMOL/L (ref 98–107)
CREAT SERPL-MCNC: 0.87 MG/DL (ref 0.51–0.95)
DEPRECATED HCO3 PLAS-SCNC: 27 MMOL/L (ref 22–29)
EGFRCR SERPLBLD CKD-EPI 2021: 89 ML/MIN/1.73M2
GLUCOSE SERPL-MCNC: 166 MG/DL (ref 70–99)
POTASSIUM SERPL-SCNC: 4.9 MMOL/L (ref 3.4–5.3)
PROT SERPL-MCNC: 7.4 G/DL (ref 6.4–8.3)
SODIUM SERPL-SCNC: 140 MMOL/L (ref 135–145)
T4 FREE SERPL-MCNC: 1.17 NG/DL (ref 0.9–1.7)
TSH SERPL DL<=0.005 MIU/L-ACNC: 5.46 UIU/ML (ref 0.3–4.2)

## 2024-07-15 ENCOUNTER — MYC MEDICAL ADVICE (OUTPATIENT)
Dept: INTERNAL MEDICINE | Facility: CLINIC | Age: 34
End: 2024-07-15
Payer: MEDICARE

## 2024-07-15 ENCOUNTER — TELEPHONE (OUTPATIENT)
Dept: GASTROENTEROLOGY | Facility: CLINIC | Age: 34
End: 2024-07-15
Payer: MEDICARE

## 2024-07-15 ENCOUNTER — PATIENT OUTREACH (OUTPATIENT)
Dept: CARE COORDINATION | Facility: CLINIC | Age: 34
End: 2024-07-15
Payer: MEDICARE

## 2024-07-15 DIAGNOSIS — Z71.89 OTHER SPECIFIED COUNSELING: Primary | Chronic | ICD-10-CM

## 2024-07-15 RX ORDER — LEVOTHYROXINE SODIUM 175 UG/1
175 TABLET ORAL DAILY
Qty: 30 TABLET | Refills: 2 | Status: SHIPPED | OUTPATIENT
Start: 2024-07-15 | End: 2024-09-09

## 2024-07-15 RX ORDER — PROCHLORPERAZINE 25 MG/1
SUPPOSITORY RECTAL
Qty: 1 EACH | Refills: 1 | Status: SHIPPED | OUTPATIENT
Start: 2024-07-15 | End: 2024-09-12

## 2024-07-15 RX ORDER — PROCHLORPERAZINE 25 MG/1
SUPPOSITORY RECTAL
Qty: 3 EACH | Refills: 5 | Status: SHIPPED | OUTPATIENT
Start: 2024-07-15 | End: 2024-09-12 | Stop reason: ALTCHOICE

## 2024-07-15 NOTE — PROGRESS NOTES
Clinic Care Coordination Contact  Community Health Worker Initial Outreach    CHW Initial Information Gathering:  Referral Source: PCP  Preferred Hospital: Other ([facility])  Preferred Urgent Care: Other ([facility])  Current living arrangement:: Other (Roomate)  Type of residence:: Apartment  Community Resources: None  Supplies Currently Used at Home: Diabetic Supplies  Equipment Currently Used at Home: none  Informal Support system:: Friends  No PCP office visit in Past Year: No  Transportation means:: Regular car       Patient accepts CC: Yes. Patient scheduled for assessment with DELL Albarran on 7/16/2024 at 11:00 am. Patient noted desire to discuss assistance with finances, insurance, diabetic supplies. CHW placed referral to [facility] for [facility], insurance, and [facility] programs.      PATRICK Mccormack  [phone]  [facility]

## 2024-07-15 NOTE — TELEPHONE ENCOUNTER
[facility] Call Center    Phone Message    May a detailed message be left on voicemail: Yes    Reason for Call: Other: Patient is currently scheduled on 9/12, as visit type New GI Urgent. This is outside the expected timeline for this referral. Patient has been added to the waitlist.      Action Taken: Message routed to:  Other: GI REFERRAL TRIAGE POOL     Travel Screening: Not Applicable

## 2024-07-16 NOTE — TELEPHONE ENCOUNTER
Patient called and discussed that it is ok if patch does come off early. Discussed mailing the Zio. Patient acknowledged understanding.

## 2024-07-17 ENCOUNTER — PATIENT OUTREACH (OUTPATIENT)
Dept: CARE COORDINATION | Facility: CLINIC | Age: 34
End: 2024-07-17
Payer: MEDICARE

## 2024-07-22 ENCOUNTER — PATIENT OUTREACH (OUTPATIENT)
Dept: CARE COORDINATION | Facility: CLINIC | Age: 34
End: 2024-07-22
Payer: MEDICARE

## 2024-07-22 NOTE — TELEPHONE ENCOUNTER
Clinic Navigator sent a [employer] message to inform the Pt that Pt is on the wait list for a sooner appointment. Clinic Navigator will reach out to the Pt via phone call or [employer] when a sooner appointment becomes available.

## 2024-07-24 ENCOUNTER — TELEPHONE (OUTPATIENT)
Dept: INTERNAL MEDICINE | Facility: CLINIC | Age: 34
End: 2024-07-24
Payer: MEDICARE

## 2024-07-24 NOTE — TELEPHONE ENCOUNTER
RN received call from patient.    Patient states due to patient having medicare, the dexicom 6 needs to be billed thru [employer].    Patient states the are needing a form to be filled out and faxed.    Per patient, she spoke with Sofia Anne and she completed the form this morning.    RN then called [facility] pharmacy.    Per pharmacy, device was cleared thru insurance but the sensors need to have documentation sent to medicare.    RN the called medicare [phone].    RN spoke with Oracio rose the [facility] mepartment.    Per Oracio, documentation from the last 6 months needed to be sent.    RN advised patient was new to our system and had one visit with provider 7/12    Oracio advised to send visit note.    Oracio assured RN this would be enough documentation.    Per Oracio, Turnaround can be up to one wee.    Unable to send script for meter and strips as that will affect ordering dexacom.    RN faxed documentation to Medicare [employer] and marked urgent.    RN updated patient.    Oracio Nash RN, BSN, PHN  [facility]

## 2024-07-28 PROCEDURE — 93244 EXT ECG>48HR<7D REV&INTERPJ: CPT | Performed by: INTERNAL MEDICINE

## 2024-07-31 ENCOUNTER — VIRTUAL VISIT (OUTPATIENT)
Dept: EDUCATION SERVICES | Facility: CLINIC | Age: 34
End: 2024-07-31
Payer: MEDICARE

## 2024-07-31 DIAGNOSIS — E13.9 DIABETES 1.5, MANAGED AS TYPE 1 (H): ICD-10-CM

## 2024-07-31 PROCEDURE — G0108 DIAB MANAGE TRN  PER INDIV: HCPCS | Mod: 95

## 2024-07-31 RX ORDER — PROCHLORPERAZINE 25 MG/1
1 SUPPOSITORY RECTAL
Qty: 1 EACH | Refills: 3 | Status: SHIPPED | OUTPATIENT
Start: 2024-07-31 | End: 2024-09-12

## 2024-07-31 RX ORDER — PROCHLORPERAZINE 25 MG/1
1 SUPPOSITORY RECTAL
Qty: 9 EACH | Refills: 3 | Status: SHIPPED | OUTPATIENT
Start: 2024-07-31 | End: 2024-09-12

## 2024-07-31 NOTE — NURSING NOTE
Current patient location:  work - in car     Is the patient currently in the state of [place]? YES    Visit mode:VIDEO    If the visit is dropped, the patient can be reconnected by: VIDEO VISIT: Text to cell phone:   Telephone Information:   Mobile [phone]       Will anyone else be joining the visit? NO  (If patient encounters technical issues they should call [phone])    How would you like to obtain your AVS? MyChart    Are changes needed to the allergy or medication list? Pt stated no med changes    Are refills needed on medications prescribed by this physician? 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 Pain level 7.

## 2024-07-31 NOTE — PROGRESS NOTES
DIABETES SELF MANAGEMENT EDUCATION: Previous Diagnosis/Individual Diabetes Review    Rufino Farr presents today for education related to Type 1 diabetes.    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    Current Diabetes Management per Patient:  Insulin Pump Type: Tandem t:slim X 2 with CIQ  At risk for hypoglycemia? Yes, once or twice a day, confusion, shaking,sweating, combative    BG results: not available   BG values are: unable to assess  Patient's most recent A1C is not meeting goal of <7.0    Lab Results   Component Value Date    A1C 7.7 07/12/2024     Nutrition:  Patient has trouble with nausea and vomiting--was worked up for gastroparesis  Takes Zofran    Breakfast: () coffee  Snack:  Lunch:() protein shake  Snack:   Dinner: () the Complete Protein Cookie  Snack:    Physical Activity:    Gets 14,000 steps per day, yoga, stretching, light exercise    Diabetes Complications:  Not discussed today.    Diabetes knowledge and skills assessment:   Patient is knowledgeable in diabetes management concepts related to: Being Active  Barriers to Learning Assessment: No Barriers identified    Based on learning assessment above, most appropriate setting for further diabetes education would be: Individual setting.    INTERVENTION:     Education provided today on:  We discussed her current situation.  She has been unable to get sensors for three weeks.  She states she does not have a meter and tells me that a medicare representative  told her she could not fill a test strip prescription because it would impede her sensor order.  Explained that this is incorrect.  A meter is always needed for back up to a sensor.  Will order through [facility] because we can speak with them regarding whatever is holding up the sensor order and problem-solve it.      Explained that the provider who orders the sensor needs to be the same as the provider who has seen you within three months to keep sensor and pump supplies flowing freely.  Make sure to get appointments in place a head of time.    She is wearing a Tandem CIQ pump she's had for 3 years and states the battery charge is hot holding as long as it should.  She'd like to upgrade her pump.  Explained Medicare will only pay for this every 5 years.  If the charge is truly an issue would need to work with tech support at [employer] and get current pump replaced.      In an effort to get glucose data attempted to hook her to our portal.  She is not currently using the [employer] samia and has a new phone.  She will need to work on getting connected after our visit.    Pt verbalized understanding of concepts discussed and recommendations provided today.         PLAN:  See [email or web address] message today's date  AVS printed and provided to patient today.    FOLLOW-UP:  Time Spent: 60 minutes  Encounter Type: Individual    Any diabetes medication initiation or dose changes were made via the [facility] Standing Orders per the patient's referring provider. A copy of this encounter was shared with the provider.

## 2024-08-07 ENCOUNTER — PATIENT OUTREACH (OUTPATIENT)
Dept: CARE COORDINATION | Facility: CLINIC | Age: 34
End: 2024-08-07
Payer: MEDICARE

## 2024-08-07 NOTE — TELEPHONE ENCOUNTER
REFERRAL INFORMATION:  Referring Provider:  Sofia BUCIO CNP  Referring Clinic:  [facility]  Reason for Visit/Diagnosis:   R10.9 (ICD-10-CM) - Gastric pain   R11.2 (ICD-10-CM) - Nausea and vomiting, unspecified vomiting type, Has recently moved to [place] from [place] has hsitory of gastric issues with nausea and vomitting also slow gastric emptying, concerned for gastro paresis due to diabetes         FUTURE VISIT INFORMATION:  Appointment Date: 9/12/24  Appointment Time:      NOTES STATUS DETAILS   OFFICE NOTE from Referring Provider Internal 7/12/24   HOSPITAL DISCHARGE SUMMARY/  ED VISITS Internal Admission 5/25/24-5/27/24-[facility]   MEDICATION LIST Internal         PERTINENT LABS Internal

## 2024-08-12 ENCOUNTER — PATIENT OUTREACH (OUTPATIENT)
Dept: CARE COORDINATION | Facility: CLINIC | Age: 34
End: 2024-08-12
Payer: MEDICARE

## 2024-08-19 ENCOUNTER — MYC MEDICAL ADVICE (OUTPATIENT)
Dept: INTERNAL MEDICINE | Facility: CLINIC | Age: 34
End: 2024-08-19
Payer: MEDICARE

## 2024-08-19 DIAGNOSIS — R00.0 TACHYCARDIA: ICD-10-CM

## 2024-08-19 DIAGNOSIS — R00.0 SINUS TACHYCARDIA: ICD-10-CM

## 2024-08-19 DIAGNOSIS — M79.10 MUSCLE PAIN: Primary | ICD-10-CM

## 2024-08-22 NOTE — PROGRESS NOTES
[facility] Consultation    Date of visit: 8/23/2024    Reason for consultation:    Rufino Farr is a 33 year old female who is seen in consultation today at the request of Sofia BUCIO CNP for chronic muscle pain.     PCP is Sofia Anne.    Please see the [facility] health questionnaire which the patient completed and reviewed with me in detail.    Chief Complaint:    Chief Complaint   Patient presents with    Pain    Pain Management    Consult     New to Dr Toussaint   Chronic muscle pain    Pain history:  Ruifno Farr is a 33 year old female with PMH of IDDM, anxiety, depression, presenting with a chief pain complaint of chronic muscle pain.     Onset: Pain started at 23 after developing multiple health issues including thyroid, T1DM and PCOS with a hysterectomy. Since then has begun to become more progressive and has more autoimmune issues - she's seeing cardiology to look into POTS. She's had TOS   Location and Radiation: Neck and paraspinals, traps, bilateral shoulders and chest wall, low back at the betline with radiation to bilateral hips, bilateral knees, elbows and hands.   Provoking: Sitting or standing too long, too much or too little activity   Palliating: Rest and stretch   Quality: Deep ache with occasional shooting, stabbing, tension, pressure  Severity: Best at 3-4/10, worst 8-9/10, lives at 6-7/10  Timing: Morning more stiff, throbbing occurs later in the day   Numbness: Bilateral 4/5 digits which is constant; bilateral tips of toes which occurs with periods of increased pain; she's had multiple EMGs in [place] without etiology   Weakness: Bilateral hands feel weak;     Sleep is poor. Has intermittent nausea/emesis. Will have back and forth symptoms of diarrhea and constipation. She reports hypermobility in her knees and hips.     Patient denies red flag symptoms of corresponding bowel/bladder symptoms,  fever/chills, saddle anesthesia, profound motor loss, weight loss, or sudden unremitting increase in pain.    Current pain medications include:  Tizanidine - 4 mg TID prn - helps but sedating    Indomethacin 100mg BID - not very helpful   Ibuprofen - only uses when not taking indomethacin   Amitriptyline 100 mg at bedtime -  for mood and anxiety, doesn't help with sleep or pain      Previous medication treatments included:  Gabapentin - did not tolerate  Lyrica - did not tolerate  Methocarbamol - no benefit  Flexeril - no benefit  Cymbalta - brain zaps     Other treatments have included:  Rufion Farr has been seen at a pain clinic in the past. Was seen in [place], moved here recently.   PT: Years ago, somewhat helpful, worked on neck and shoulders    OT: Years ago, worked on posture and positioning   Injections:    - Cervical RFA - no relief, made it worse   - Trigger points - helpful but very short term   Surgery:    - Bilateral ulnar decompression - limited benefit    - Bilateral carpal tunnel release - limited benefit   Alternative:    - Chiro - no lasting relief   - Biofeedback    - Densensitization therapy    - TENS unit - not helpful     Past Medical History:  Past Medical History:   Diagnosis Date    Depression     with Anxiety    Diabetes (H) 2017    Type 1    Hashimoto's thyroiditis 2010    PTSD (post-traumatic stress disorder)      Patient Active Problem List    Diagnosis Date Noted    Complicated migraine, intractable 05/25/2024     Priority: Medium       Past Surgical History:  Past Surgical History:   Procedure Laterality Date    HYSTERECTOMY, PAP NO LONGER INDICATED Bilateral      Medications:  Current Outpatient Medications   Medication Sig Dispense Refill    acetaminophen (TYLENOL) 325 MG tablet Take 2 tablets (650 mg) by mouth every 4 hours as needed for mild pain or fever      amitriptyline (ELAVIL) 100 MG tablet Take 1 tablet (100 mg) by mouth at bedtime 30 tablet 3    blood glucose (NO BRAND  SPECIFIED) test strip Use to test blood sugar 4 times daily while off sensor 100 strip 11    blood glucose monitoring (NO BRAND SPECIFIED) meter device kit Use to test blood sugar 4 times daily or as directed. 1 kit 0    calcium carbonate (TUMS) 500 MG chewable tablet Take 1 tablet (500 mg) by mouth 4 times daily as needed for heartburn      cetirizine (ZYRTEC) 10 MG tablet Take 10 mg by mouth every evening      Continuous Glucose Sensor (DEXCOM G6 SENSOR) MISC 1 each every 10 days 9 each 3    Continuous Glucose Sensor (DEXCOM G6 SENSOR) MISC Change every 10 days. 3 each 5    Continuous Glucose Transmitter (DEXCOM G6 TRANSMITTER) MISC 1 each every 3 months 1 each 3    Continuous Glucose Transmitter (DEXCOM G6 TRANSMITTER) MISC Change every 3 months. 1 each 1    estradiol (ESTRACE) 1 MG tablet Take 1 tablet (1 mg) by mouth every evening 30 tablet 3    HUMALOG 100 UNIT/ML injection Inject 1 Units subcutaneously 4 times daily (with meals and nightly) MAX DAILY DOSE OF 65 UNITS VIA INSULIN PUMP 10 mL 3    ibuprofen (ADVIL/MOTRIN) 800 MG tablet Take 1 tablet (800 mg) by mouth every 6 hours as needed for inflammatory pain      indomethacin (INDOCIN) 50 MG capsule Take 100 mg by mouth 2 times daily as needed for moderate pain      levothyroxine (SYNTHROID/LEVOTHROID) 175 MCG tablet Take 1 tablet (175 mcg) by mouth daily MONDAY TO FRIDAY ONLY 30 tablet 2    metroNIDAZOLE (METROGEL) 0.75 % vaginal gel Place vaginally twice a week As needed      ondansetron (ZOFRAN ODT) 4 MG ODT tab Take 1 tablet (4 mg) by mouth every 8 hours as needed for nausea 14 tablet 0    tiZANidine (ZANAFLEX) 4 MG tablet Take 1 tablet (4 mg) by mouth 3 times daily 90 tablet 3     Allergies:     Allergies   Allergen Reactions    Aspirin Shortness Of Breath       Social History:  Home situation: Apt in [place], roommate   Occupation/Schooling: Dog training in [place]  Tobacco use: Vape   Alcohol use: None  Drug use: marijuana   History of chemical  dependency treatment: no     Family history:  Family History   Problem Relation Age of Onset    Glaucoma No family hx of     Macular Degeneration No family hx of        Physical Exam:  Vitals:    08/23/24 1027   BP: 116/81   Pulse: 91   Resp: 16   SpO2: 100%     Exam:  Constitutional: Well developed, NAD  Head: normocephalic. Atraumatic.   Eyes: no redness or jaundice noted   CV: warm, well perfused extremities   Skin: no suspicious lesions or rashes   Psychiatric: mentation appears normal and affect     Musculoskeletal exam:  Gait/Station/Posture:   Normal stance, arm swing, and stride; no antalgia or Trendelenburg  Normal bulk and tone. Unremarkable spinal curvature.     Cervical spine:  Range of motion within normal limits   Myofascial tenderness: Tenderness throughout cervical paraspinals, trapezius and periscapular region   No focal spinous process tenderness.      Lumbar spine:  Range of motion within normal limits   Myofascial tenderness:  Tenderness to bilateral lower lumbar spine and into QL, gluteals, GT, IT  No focal spinous process tenderness    Neurologic exam:  Motor Strength:    Right Left  Shoulder shrug (C4)   5/5 5/5   Shoulder abduction (C5,6)  5/5 5/5  Elbow flexion (C5,6)    5/5 5/5  Elbow extension (C7)   5/5 5/5  Wrist Extension (C6,7)  5/5 5/5  Finger abduction (C8)   5/5 5/5                    Right        Left  Hip Flexion (L1-2)                    5/5           5/5  Knee Extension (L3)                5/5           5/5  Ankle Dorsiflexion (L4)                5/5           5/5  Ankle Plantarflexion                    5/5           5/5  1st MTP Extension (L5)              5/5           5/5  Ankle Eversion (S1)                    5/5           5/5    Other reflexes:  Toes downgoing   No ankle clonus  Sensory:  (upper and lower extremities):   Light touch: diminished in ulnar distribution bilaterally    Allodynia: absent    Hyperalgesia: absent     Diagnostic tests:  No relevant imaging  available for review.     Other testing (labs, diagnostics) reviewed:  A1c 7.7%  Cr 0.87    Assessment:  Chronic myofascial pain   Depression  Anxiety    Rufino Farr is a 33 year old female with PMH of IDDM, anxiety, depression, presenting with a chief pain complaint of chronic myofascial pain for greater than 10 years without history of trauma or inciting event. She reports total body pain including cervical paraspinals, periscapular region, shoulders, elbows hands, low back at the betline with radiation to bilateral hips, bilateral knees, and ankles. No red flags on history or exam today concerning for acute pathology. She reports poor sleep, alternating constipation and diarrhea, and hypermobile joints. She takes indomethacin with some mild relief, she's aware of potential renal impact. Tizanidine is helpful for sleep and Amitriptyline is for her mood. She has followed with pain providers in [place] and has trialed a number of anticonvulsants, Cymbalta and muscle relaxants. She has done PT, OT, biofeedback, acupuncture and TENS unit. She had a cervical RFA that made her pain worse. Today her picture is most consistent chronic myofascial pain with comorbid conditions of anxiety and depression. She is interested in medical cannabis and agreeable to Pain PT and Pain Psychology.      Plan:  Diagnosis reviewed, treatment option addressed, and risk/benefits discussed.     Physical Therapy: Pain PT ordered  Diagnostic Studies: None indicated at this time.   Medication Management:   Discussed only utilizing NSAIDS as needed to avoid renal injury in setting of her T1DM  Discussed if utilizing less NSAIDs, she could trial 4-8 mg of tizanidine in evenings given she does not use during the day  Referral to [facility] for medical cannabis evaluation   Procedures: Do not recommend injections for today's presentation of chronic myofascial pain   Pain Psychologist to address issues of relaxation, behavioral change, coping  style, and other factors important to improvement: Referral placed  Other treatments: She has tried TENS units, chiro and acupuncture in the past; reasonable to trial again, PCP may order  Follow up: As needed    60 minutes spent on the date of encounter doing chart review, history, and exam documentation and further activities as noted above.     Patient was staffed with attending physician, Dr. Toussaint.     Gonzalo Dsouza DO  PM&R Resident  [facility]    I saw and examined the patient with the Pain Fellow/Resident. I have reviewed and agree with the resident's note and plan of care and made changes and corrections directly to the body of the note.    TIME SPENT:  BY FELLOW/RESIDENT ALONE 25 MIN  BY MYSELF AND FELLOW/RESIDENT TOGETHER 35 MIN      Trice Toussaint MD  Pain Medicine, Department of Anesthesiology  , [facility]      Answers submitted by the patient for this visit:  Patient Health Questionnaire (G7) (Submitted on 8/23/2024)  DEEPIKA 7 TOTAL SCORE: 11

## 2024-08-22 NOTE — PROGRESS NOTES
CARDIOLOGY CONSULT    REASON FOR CONSULT: Tachycardia    PRIMARY CARE PHYSICIAN:  Sofia Anne    HISTORY OF PRESENT ILLNESS:  33-year-old female seen for tachycardia.  She has type 1 diabetes diagnosed age 24.    She has a variety of symptoms that are been going on at least several months.  She describes intermittent palpitations with tachycardia documented by checking her pulse and on her SnapLayout watch.  Resting heart rate will typically be in the 80s to 90s, but heart rate can easily go to 120 or even higher with little activity.  She will have some intermittent sweating and feel lightheadedness when she stands up.  She denies overt syncope or chest pain.  She admits to drinking a fair amount of fluid and avoiding excessive caffeine, alcohol, or stimulants.    Zio monitor July 2024 showed sinus rhythm, average heart rate 91, no arrhythmia, minimal ectopy, many patient triggers all during sinus rhythm.    PAST MEDICAL HISTORY:  Past Medical History:   Diagnosis Date    Depression     with Anxiety    Diabetes (H) 2017    Type 1    Hashimoto's thyroiditis 2010    PTSD (post-traumatic stress disorder)        MEDICATIONS:  Current Outpatient Medications   Medication Sig Dispense Refill    acetaminophen (TYLENOL) 325 MG tablet Take 2 tablets (650 mg) by mouth every 4 hours as needed for mild pain or fever      amitriptyline (ELAVIL) 100 MG tablet Take 1 tablet (100 mg) by mouth at bedtime 30 tablet 3    blood glucose (NO BRAND SPECIFIED) test strip Use to test blood sugar 4 times daily while off sensor 100 strip 11    blood glucose monitoring (NO BRAND SPECIFIED) meter device kit Use to test blood sugar 4 times daily or as directed. 1 kit 0    calcium carbonate (TUMS) 500 MG chewable tablet Take 1 tablet (500 mg) by mouth 4 times daily as needed for heartburn      cetirizine (ZYRTEC) 10 MG tablet Take 10 mg by mouth every evening      Continuous Glucose Sensor (DEXCOM G6 SENSOR) MISC 1 each every 10 days 9 each  3    Continuous Glucose Sensor (DEXCOM G6 SENSOR) MISC Change every 10 days. 3 each 5    Continuous Glucose Transmitter (DEXCOM G6 TRANSMITTER) MISC 1 each every 3 months 1 each 3    Continuous Glucose Transmitter (DEXCOM G6 TRANSMITTER) MISC Change every 3 months. 1 each 1    estradiol (ESTRACE) 1 MG tablet Take 1 tablet (1 mg) by mouth every evening 30 tablet 3    HUMALOG 100 UNIT/ML injection Inject 1 Units subcutaneously 4 times daily (with meals and nightly) MAX DAILY DOSE OF 65 UNITS VIA INSULIN PUMP 10 mL 3    ibuprofen (ADVIL/MOTRIN) 800 MG tablet Take 1 tablet (800 mg) by mouth every 6 hours as needed for inflammatory pain      indomethacin (INDOCIN) 50 MG capsule Take 100 mg by mouth 2 times daily as needed for moderate pain      levothyroxine (SYNTHROID/LEVOTHROID) 175 MCG tablet Take 1 tablet (175 mcg) by mouth daily MONDAY TO FRIDAY ONLY 30 tablet 2    metroNIDAZOLE (METROGEL) 0.75 % vaginal gel Place vaginally twice a week As needed      ondansetron (ZOFRAN ODT) 4 MG ODT tab Take 1 tablet (4 mg) by mouth every 8 hours as needed for nausea 14 tablet 0    tiZANidine (ZANAFLEX) 4 MG tablet Take 1 tablet (4 mg) by mouth 3 times daily 90 tablet 3     No current facility-administered medications for this visit.       ALLERGIES:  Allergies   Allergen Reactions    Aspirin Shortness Of Breath       SOCIAL HISTORY:  I have reviewed this patient's social history and updated it with pertinent information if needed. Rufino Farr  reports that she has quit smoking. Her smoking use included cigarettes. She has been exposed to tobacco smoke. She uses smokeless tobacco. She reports that she does not currently use alcohol. She reports current drug use. Drug: Marijuana.    FAMILY HISTORY:  I have reviewed this patient's family history and updated it with pertinent information if needed.   Family History   Problem Relation Age of Onset    Glaucoma No family hx of     Macular Degeneration No family hx of        REVIEW OF  "SYSTEMS:  Constitutional:  No weight loss, fever, chills  HEENT:  Eyes:  No visual loss, blurred vision, double vision or yellow sclerae. No hearing loss, sneezing, congestion, runny nose or sore throat.  Skin:  No rash or itching.  Cardiovascular: per HPI  Respiratory: per HPI  GI:  No anorexia, nausea, vomiting or diarrhea. No abdominal pain or blood.  :  No dysurea, hematuria  Neurologic:  No headache, paralysis, ataxia, numbness or tingling in the extremities. No change in bowel or bladder control.  Musculoskeletal:  No muscle pain  Hematologic:  No bleeding or bruising.  Lymphatics:  No enlarged nodes. No history of splenectomy.  Endocrine:  No reports of sweating, cold or heat intolerance. No polyuria or polydipsia.  Allergies:  No history of asthma, hives, eczema or rhinitis.    PHYSICAL EXAM:  /82   Pulse 98   Ht 1.64 m (5' 4.57\")   Wt 80.3 kg (177 lb)   BMI 29.85 kg/m    Heart rate lying 90, heart rate standing sustained 120  Constitutional: awake, alert, no distress  Eyes: PERRL, sclera nonicteric  ENT: trachea midline  Respiratory: Lungs clear  Cardiovascular: Regular rate and rhythm, no murmurs  GI: nondistended, nontender, bowel sounds present  Lymph/Hematologic: no lymphadenopathy  Skin: dry, no rash  Musculoskeletal: good muscle tone, strength 5/5 in upper and lower extremities  Neurologic: no focal deficits  Neuropsychiatric: appropriate affact    DATA:  Labs:   July 2024: Potassium 4.9, creatinine 0.9, free T4 1.2    EKG, May 2024: Sinus rhythm, rate 83, normal EKG    ASSESSMENT:  33-year-old female seen for tachycardia.  She seems to have intermittent tachycardia.  There were no arrhythmias on her Zio monitor.  Heart rate does seem exaggerated for her level of activity.  Echo will be done to rule out any structural heart disease.  Serum metanephrines will be done, although pheochromocytoma would be very unlikely.  She may be falling on the POTS spectrum.  She was encouraged to continue " with a lot of fluid and even some extra salt intake.  She is agreeable to a trial of metoprolol.  Obviously there is no obvious pain, dehydration, infection, or other abnormality that may be driving up her heart rate.  Her thyroid is close to normal.    RECOMMENDATIONS:  1.  Tachycardia, possible POTS spectrum  -Echo  -Serum metanephrines  -Trial of Toprol 25 mg daily    Patient can call the nurse line in 1 to 2 weeks to report symptoms and heart rate, could increase the dose of metoprolol.    Follow-up in 1 month with RADHA.    Chivo Velazquez MD  Cardiology - [facility]  Pager:  [phone]  Text Page  August 23, 2024

## 2024-08-23 ENCOUNTER — OFFICE VISIT (OUTPATIENT)
Dept: CARDIOLOGY | Facility: CLINIC | Age: 34
End: 2024-08-23
Payer: MEDICARE

## 2024-08-23 ENCOUNTER — OFFICE VISIT (OUTPATIENT)
Dept: ANESTHESIOLOGY | Facility: CLINIC | Age: 34
End: 2024-08-23
Payer: MEDICARE

## 2024-08-23 VITALS
SYSTOLIC BLOOD PRESSURE: 135 MMHG | HEART RATE: 98 BPM | BODY MASS INDEX: 29.49 KG/M2 | HEIGHT: 65 IN | DIASTOLIC BLOOD PRESSURE: 82 MMHG | WEIGHT: 177 LBS

## 2024-08-23 VITALS
DIASTOLIC BLOOD PRESSURE: 81 MMHG | RESPIRATION RATE: 16 BRPM | HEART RATE: 91 BPM | OXYGEN SATURATION: 100 % | SYSTOLIC BLOOD PRESSURE: 116 MMHG

## 2024-08-23 DIAGNOSIS — M79.10 MUSCLE PAIN: ICD-10-CM

## 2024-08-23 DIAGNOSIS — R00.0 TACHYCARDIA: ICD-10-CM

## 2024-08-23 DIAGNOSIS — R00.0 SINUS TACHYCARDIA: Primary | ICD-10-CM

## 2024-08-23 DIAGNOSIS — M79.18 MYOFASCIAL PAIN: Primary | ICD-10-CM

## 2024-08-23 PROCEDURE — 99203 OFFICE O/P NEW LOW 30 MIN: CPT | Mod: GC | Performed by: ANESTHESIOLOGY

## 2024-08-23 PROCEDURE — 99204 OFFICE O/P NEW MOD 45 MIN: CPT | Performed by: INTERNAL MEDICINE

## 2024-08-23 RX ORDER — METOPROLOL SUCCINATE 25 MG/1
25 TABLET, EXTENDED RELEASE ORAL DAILY
Qty: 30 TABLET | Refills: 3 | Status: SHIPPED | OUTPATIENT
Start: 2024-08-23

## 2024-08-23 ASSESSMENT — PAIN SCALES - PAIN ENJOYMENT GENERAL ACTIVITY SCALE (PEG)
AVG_PAIN_PASTWEEK: 6
PEG_TOTALSCORE: 8
INTERFERED_GENERAL_ACTIVITY: 9
PEG_TOTALSCORE: 8
INTERFERED_ENJOYMENT_LIFE: 9

## 2024-08-23 ASSESSMENT — ANXIETY QUESTIONNAIRES
GAD7 TOTAL SCORE: 11
8. IF YOU CHECKED OFF ANY PROBLEMS, HOW DIFFICULT HAVE THESE MADE IT FOR YOU TO DO YOUR WORK, TAKE CARE OF THINGS AT HOME, OR GET ALONG WITH OTHER PEOPLE?: VERY DIFFICULT
GAD7 TOTAL SCORE: 11
GAD7 TOTAL SCORE: 11
7. FEELING AFRAID AS IF SOMETHING AWFUL MIGHT HAPPEN: SEVERAL DAYS

## 2024-08-23 ASSESSMENT — PAIN SCALES - GENERAL: PAINLEVEL: SEVERE PAIN (7)

## 2024-08-23 NOTE — NURSING NOTE
Patient presents with:  Pain  Pain Management  Consult: New to Dr Toussaint      Severe Pain (7)     Pain Medications       Analgesics Other Refills Start End     acetaminophen (TYLENOL) 325 MG tablet -- 5/27/2024 --    Sig - Route: Take 2 tablets (650 mg) by mouth every 4 hours as needed for mild pain or fever - Oral    Class: No Print Out            What medications are you using for pain? Tylenol, indomethacin (occasional ibuprofen), Tizanidine     Have you been seen by another pain clinic/ provider? 7 years ago in [place]    Expectations: pain management and eval, plan    Maricarmen Molina, JOE

## 2024-08-23 NOTE — LETTER
8/23/2024    Sofia Anne, APRN CNP  [address] [place] [address]    RE: Rufino Farr       Dear Colleague,     I had the pleasure of seeing Rufino Farr in the [facility].  CARDIOLOGY CONSULT    REASON FOR CONSULT: Tachycardia    PRIMARY CARE PHYSICIAN:  Sofia Anne    HISTORY OF PRESENT ILLNESS:  33-year-old female seen for tachycardia.  She has type 1 diabetes diagnosed age 24.    She has a variety of symptoms that are been going on at least several months.  She describes intermittent palpitations with tachycardia documented by checking her pulse and on her [employer] watch.  Resting heart rate will typically be in the 80s to 90s, but heart rate can easily go to 120 or even higher with little activity.  She will have some intermittent sweating and feel lightheadedness when she stands up.  She denies overt syncope or chest pain.  She admits to drinking a fair amount of fluid and avoiding excessive caffeine, alcohol, or stimulants.    Zio monitor July 2024 showed sinus rhythm, average heart rate 91, no arrhythmia, minimal ectopy, many patient triggers all during sinus rhythm.    PAST MEDICAL HISTORY:  Past Medical History:   Diagnosis Date     Depression     with Anxiety     Diabetes (H) 2017    Type 1     Hashimoto's thyroiditis 2010     PTSD (post-traumatic stress disorder)        MEDICATIONS:  Current Outpatient Medications   Medication Sig Dispense Refill     acetaminophen (TYLENOL) 325 MG tablet Take 2 tablets (650 mg) by mouth every 4 hours as needed for mild pain or fever       amitriptyline (ELAVIL) 100 MG tablet Take 1 tablet (100 mg) by mouth at bedtime 30 tablet 3     blood glucose (NO BRAND SPECIFIED) test strip Use to test blood sugar 4 times daily while off sensor 100 strip 11     blood glucose monitoring (NO BRAND SPECIFIED) meter device kit Use to test blood sugar 4 times daily or as directed. 1 kit 0     calcium carbonate (TUMS) 500 MG chewable tablet  Take 1 tablet (500 mg) by mouth 4 times daily as needed for heartburn       cetirizine (ZYRTEC) 10 MG tablet Take 10 mg by mouth every evening       Continuous Glucose Sensor (DEXCOM G6 SENSOR) MISC 1 each every 10 days 9 each 3     Continuous Glucose Sensor (DEXCOM G6 SENSOR) MISC Change every 10 days. 3 each 5     Continuous Glucose Transmitter (DEXCOM G6 TRANSMITTER) MISC 1 each every 3 months 1 each 3     Continuous Glucose Transmitter (DEXCOM G6 TRANSMITTER) MISC Change every 3 months. 1 each 1     estradiol (ESTRACE) 1 MG tablet Take 1 tablet (1 mg) by mouth every evening 30 tablet 3     HUMALOG 100 UNIT/ML injection Inject 1 Units subcutaneously 4 times daily (with meals and nightly) MAX DAILY DOSE OF 65 UNITS VIA INSULIN PUMP 10 mL 3     ibuprofen (ADVIL/MOTRIN) 800 MG tablet Take 1 tablet (800 mg) by mouth every 6 hours as needed for inflammatory pain       indomethacin (INDOCIN) 50 MG capsule Take 100 mg by mouth 2 times daily as needed for moderate pain       levothyroxine (SYNTHROID/LEVOTHROID) 175 MCG tablet Take 1 tablet (175 mcg) by mouth daily MONDAY TO FRIDAY ONLY 30 tablet 2     metroNIDAZOLE (METROGEL) 0.75 % vaginal gel Place vaginally twice a week As needed       ondansetron (ZOFRAN ODT) 4 MG ODT tab Take 1 tablet (4 mg) by mouth every 8 hours as needed for nausea 14 tablet 0     tiZANidine (ZANAFLEX) 4 MG tablet Take 1 tablet (4 mg) by mouth 3 times daily 90 tablet 3     No current facility-administered medications for this visit.       ALLERGIES:  Allergies   Allergen Reactions     Aspirin Shortness Of Breath       SOCIAL HISTORY:  I have reviewed this patient's social history and updated it with pertinent information if needed. Rufino Farr  reports that she has quit smoking. Her smoking use included cigarettes. She has been exposed to tobacco smoke. She uses smokeless tobacco. She reports that she does not currently use alcohol. She reports current drug use. Drug: Marijuana.    FAMILY  "HISTORY:  I have reviewed this patient's family history and updated it with pertinent information if needed.   Family History   Problem Relation Age of Onset     Glaucoma No family hx of      Macular Degeneration No family hx of        REVIEW OF SYSTEMS:  Constitutional:  No weight loss, fever, chills  HEENT:  Eyes:  No visual loss, blurred vision, double vision or yellow sclerae. No hearing loss, sneezing, congestion, runny nose or sore throat.  Skin:  No rash or itching.  Cardiovascular: per HPI  Respiratory: per HPI  GI:  No anorexia, nausea, vomiting or diarrhea. No abdominal pain or blood.  :  No dysurea, hematuria  Neurologic:  No headache, paralysis, ataxia, numbness or tingling in the extremities. No change in bowel or bladder control.  Musculoskeletal:  No muscle pain  Hematologic:  No bleeding or bruising.  Lymphatics:  No enlarged nodes. No history of splenectomy.  Endocrine:  No reports of sweating, cold or heat intolerance. No polyuria or polydipsia.  Allergies:  No history of asthma, hives, eczema or rhinitis.    PHYSICAL EXAM:  /82   Pulse 98   Ht 1.64 m (5' 4.57\")   Wt 80.3 kg (177 lb)   BMI 29.85 kg/m    Heart rate lying 90, heart rate standing sustained 120  Constitutional: awake, alert, no distress  Eyes: PERRL, sclera nonicteric  ENT: trachea midline  Respiratory: Lungs clear  Cardiovascular: Regular rate and rhythm, no murmurs  GI: nondistended, nontender, bowel sounds present  Lymph/Hematologic: no lymphadenopathy  Skin: dry, no rash  Musculoskeletal: good muscle tone, strength 5/5 in upper and lower extremities  Neurologic: no focal deficits  Neuropsychiatric: appropriate affact    DATA:  Labs:   July 2024: Potassium 4.9, creatinine 0.9, free T4 1.2    EKG, May 2024: Sinus rhythm, rate 83, normal EKG    ASSESSMENT:  33-year-old female seen for tachycardia.  She seems to have intermittent tachycardia.  There were no arrhythmias on her Zio monitor.  Heart rate does seem exaggerated " for her level of activity.  Echo will be done to rule out any structural heart disease.  Serum metanephrines will be done, although pheochromocytoma would be very unlikely.  She may be falling on the POTS spectrum.  She was encouraged to continue with a lot of fluid and even some extra salt intake.  She is agreeable to a trial of metoprolol.  Obviously there is no obvious pain, dehydration, infection, or other abnormality that may be driving up her heart rate.  Her thyroid is close to normal.    RECOMMENDATIONS:  1.  Tachycardia, possible POTS spectrum  -Echo  -Serum metanephrines  -Trial of Toprol 25 mg daily    Patient can call the nurse line in 1 to 2 weeks to report symptoms and heart rate, could increase the dose of metoprolol.    Follow-up in 1 month with RADHA.    Chivo Velazquez MD  Cardiology - [facility]  Pager:  [phone]  Text Page  August 23, 2024        Thank you for allowing me to participate in the care of your patient.      Sincerely,     Chivo Velazquez MD     [facility]  cc:   Sofia Anne, APRN CNP  [address] [place] [address]

## 2024-08-23 NOTE — NURSING NOTE
RN reviewed AVS with patient. Patient to contact clinic if any questions/concerns. Patient verbalized understanding.    Sofia Rollins RNCC

## 2024-08-23 NOTE — PATIENT INSTRUCTIONS
Referrals:    Physical Therapy Referral placed-   If you have not heard from the scheduling office within 2 business days, please call [phone] for all locations     Pain Psychology Referral placed-  Please contact their scheduling office at [phone] to schedule, if you have not heard from them within 2 business days.     Pain psychology Therapies Requested- Biofeedback, Mindfulness training, CBT, Coping and relaxation therapy.     Please schedule a follow up appointment with your Pain Clinic provider after completing Pain Psychology appointment. Schedule appointment by calling [phone].       Treatment planning:    Follow up with FORTUNATO Ledbetter CNP for medical cannabis certification      Recommended Follow up:      Follow up as needed.       To speak with a nurse, schedule/reschedule/cancel a clinic appointment, or request a medication refill call: [phone].    You can also reach us by [employer]: [email or web address]

## 2024-08-23 NOTE — LETTER
8/23/2024       RE: Rufino Farr  [address]  [place] [address]     Dear Colleague,    Thank you for referring your patient, Rufino Farr, to the [facility] [place] at [facility]. Please see a copy of my visit note below.                          [facility] Consultation    Date of visit: 8/23/2024    Reason for consultation:    Rufino Farr is a 33 year old female who is seen in consultation today at the request of Sofia BUCIO CNP for chronic muscle pain.     PCP is Sofia Anne.    Please see the [facility] [place] health questionnaire which the patient completed and reviewed with me in detail.    Chief Complaint:    Chief Complaint   Patient presents with     Pain     Pain Management     Consult     New to Dr Toussaint   Chronic muscle pain    Pain history:  Rufino Farr is a 33 year old female with PMH of IDDM, anxiety, depression, presenting with a chief pain complaint of chronic muscle pain.     Onset: Pain started at 23 after developing multiple health issues including thyroid, T1DM and PCOS with a hysterectomy. Since then has begun to become more progressive and has more autoimmune issues - she's seeing cardiology to look into POTS. She's had TOS   Location and Radiation: Neck and paraspinals, traps, bilateral shoulders and chest wall, low back at the betline with radiation to bilateral hips, bilateral knees, elbows and hands.   Provoking: Sitting or standing too long, too much or too little activity   Palliating: Rest and stretch   Quality: Deep ache with occasional shooting, stabbing, tension, pressure  Severity: Best at 3-4/10, worst 8-9/10, lives at 6-7/10  Timing: Morning more stiff, throbbing occurs later in the day   Numbness: Bilateral 4/5 digits which is constant; bilateral tips of toes which occurs with periods of  increased pain; she's had multiple EMGs in [place] without etiology   Weakness: Bilateral hands feel weak;     Sleep is poor. Has intermittent nausea/emesis. Will have back and forth symptoms of diarrhea and constipation. She reports hypermobility in her knees and hips.     Patient denies red flag symptoms of corresponding bowel/bladder symptoms, fever/chills, saddle anesthesia, profound motor loss, weight loss, or sudden unremitting increase in pain.    Current pain medications include:  Tizanidine - 4 mg TID prn - helps but sedating    Indomethacin 100mg BID - not very helpful   Ibuprofen - only uses when not taking indomethacin   Amitriptyline 100 mg at bedtime -  for mood and anxiety, doesn't help with sleep or pain      Previous medication treatments included:  Gabapentin - did not tolerate  Lyrica - did not tolerate  Methocarbamol - no benefit  Flexeril - no benefit  Cymbalta - brain zaps     Other treatments have included:  Rufino Farr has been seen at a pain clinic in the past. Was seen in [place], moved here recently.   PT: Years ago, somewhat helpful, worked on neck and shoulders    OT: Years ago, worked on posture and positioning   Injections:    - Cervical RFA - no relief, made it worse   - Trigger points - helpful but very short term   Surgery:    - Bilateral ulnar decompression - limited benefit    - Bilateral carpal tunnel release - limited benefit   Alternative:    - Chiro - no lasting relief   - Biofeedback    - Densensitization therapy    - TENS unit - not helpful     Past Medical History:  Past Medical History:   Diagnosis Date     Depression     with Anxiety     Diabetes (H) 2017    Type 1     Hashimoto's thyroiditis 2010     PTSD (post-traumatic stress disorder)      Patient Active Problem List    Diagnosis Date Noted     Complicated migraine, intractable 05/25/2024     Priority: Medium       Past Surgical History:  Past Surgical History:   Procedure Laterality Date     HYSTERECTOMY, PAP NO LONGER  INDICATED Bilateral      Medications:  Current Outpatient Medications   Medication Sig Dispense Refill     acetaminophen (TYLENOL) 325 MG tablet Take 2 tablets (650 mg) by mouth every 4 hours as needed for mild pain or fever       amitriptyline (ELAVIL) 100 MG tablet Take 1 tablet (100 mg) by mouth at bedtime 30 tablet 3     blood glucose (NO BRAND SPECIFIED) test strip Use to test blood sugar 4 times daily while off sensor 100 strip 11     blood glucose monitoring (NO BRAND SPECIFIED) meter device kit Use to test blood sugar 4 times daily or as directed. 1 kit 0     calcium carbonate (TUMS) 500 MG chewable tablet Take 1 tablet (500 mg) by mouth 4 times daily as needed for heartburn       cetirizine (ZYRTEC) 10 MG tablet Take 10 mg by mouth every evening       Continuous Glucose Sensor (DEXCOM G6 SENSOR) MISC 1 each every 10 days 9 each 3     Continuous Glucose Sensor (DEXCOM G6 SENSOR) MISC Change every 10 days. 3 each 5     Continuous Glucose Transmitter (DEXCOM G6 TRANSMITTER) MISC 1 each every 3 months 1 each 3     Continuous Glucose Transmitter (DEXCOM G6 TRANSMITTER) MISC Change every 3 months. 1 each 1     estradiol (ESTRACE) 1 MG tablet Take 1 tablet (1 mg) by mouth every evening 30 tablet 3     HUMALOG 100 UNIT/ML injection Inject 1 Units subcutaneously 4 times daily (with meals and nightly) MAX DAILY DOSE OF 65 UNITS VIA INSULIN PUMP 10 mL 3     ibuprofen (ADVIL/MOTRIN) 800 MG tablet Take 1 tablet (800 mg) by mouth every 6 hours as needed for inflammatory pain       indomethacin (INDOCIN) 50 MG capsule Take 100 mg by mouth 2 times daily as needed for moderate pain       levothyroxine (SYNTHROID/LEVOTHROID) 175 MCG tablet Take 1 tablet (175 mcg) by mouth daily MONDAY TO FRIDAY ONLY 30 tablet 2     metroNIDAZOLE (METROGEL) 0.75 % vaginal gel Place vaginally twice a week As needed       ondansetron (ZOFRAN ODT) 4 MG ODT tab Take 1 tablet (4 mg) by mouth every 8 hours as needed for nausea 14 tablet 0      tiZANidine (ZANAFLEX) 4 MG tablet Take 1 tablet (4 mg) by mouth 3 times daily 90 tablet 3     Allergies:     Allergies   Allergen Reactions     Aspirin Shortness Of Breath       Social History:  Home situation: Apt in [place], roommate   Occupation/Schooling: Dog training in [place]  Tobacco use: Vape   Alcohol use: None  Drug use: marijuana   History of chemical dependency treatment: no     Family history:  Family History   Problem Relation Age of Onset     Glaucoma No family hx of      Macular Degeneration No family hx of        Physical Exam:  Vitals:    08/23/24 1027   BP: 116/81   Pulse: 91   Resp: 16   SpO2: 100%     Exam:  Constitutional: Well developed, NAD  Head: normocephalic. Atraumatic.   Eyes: no redness or jaundice noted   CV: warm, well perfused extremities   Skin: no suspicious lesions or rashes   Psychiatric: mentation appears normal and affect     Musculoskeletal exam:  Gait/Station/Posture:   Normal stance, arm swing, and stride; no antalgia or Trendelenburg  Normal bulk and tone. Unremarkable spinal curvature.     Cervical spine:  Range of motion within normal limits   Myofascial tenderness: Tenderness throughout cervical paraspinals, trapezius and periscapular region   No focal spinous process tenderness.      Lumbar spine:  Range of motion within normal limits   Myofascial tenderness:  Tenderness to bilateral lower lumbar spine and into QL, gluteals, GT, IT  No focal spinous process tenderness    Neurologic exam:  Motor Strength:    Right Left  Shoulder shrug (C4)   5/5 5/5   Shoulder abduction (C5,6)  5/5 5/5  Elbow flexion (C5,6)    5/5 5/5  Elbow extension (C7)   5/5 5/5  Wrist Extension (C6,7)  5/5 5/5  Finger abduction (C8)   5/5 5/5                    Right        Left  Hip Flexion (L1-2)                    5/5           5/5  Knee Extension (L3)                5/5           5/5  Ankle Dorsiflexion (L4)                5/5           5/5  Ankle Plantarflexion                    5/5            5/5  1st MTP Extension (L5)              5/5           5/5  Ankle Eversion (S1)                    5/5           5/5    Other reflexes:  Toes downgoing   No ankle clonus  Sensory:  (upper and lower extremities):   Light touch: diminished in ulnar distribution bilaterally    Allodynia: absent    Hyperalgesia: absent     Diagnostic tests:  No relevant imaging available for review.     Other testing (labs, diagnostics) reviewed:  A1c 7.7%  Cr 0.87    Assessment:  Chronic myofascial pain   Depression  Anxiety    Rufino Farr is a 33 year old female with PMH of IDDM, anxiety, depression, presenting with a chief pain complaint of chronic myofascial pain for greater than 10 years without history of trauma or inciting event. She reports total body pain including cervical paraspinals, periscapular region, shoulders, elbows hands, low back at the betline with radiation to bilateral hips, bilateral knees, and ankles. No red flags on history or exam today concerning for acute pathology. She reports poor sleep, alternating constipation and diarrhea, and hypermobile joints. She takes indomethacin with some mild relief, she's aware of potential renal impact. Tizanidine is helpful for sleep and Amitriptyline is for her mood. She has followed with pain providers in [place] and has trialed a number of anticonvulsants, Cymbalta and muscle relaxants. She has done PT, OT, biofeedback, acupuncture and TENS unit. She had a cervical RFA that made her pain worse. Today her picture is most consistent chronic myofascial pain with comorbid conditions of anxiety and depression. She is interested in medical cannabis and agreeable to Pain PT and Pain Psychology.      Plan:  Diagnosis reviewed, treatment option addressed, and risk/benefits discussed.     Physical Therapy: Pain PT ordered  Diagnostic Studies: None indicated at this time.   Medication Management:   Discussed only utilizing NSAIDS as needed to avoid renal injury in setting of her  T1DM  Discussed if utilizing less NSAIDs, she could trial 4-8 mg of tizanidine in evenings given she does not use during the day  Referral to Elly Yu for medical cannabis evaluation   Procedures: Do not recommend injections for today's presentation of chronic myofascial pain   Pain Psychologist to address issues of relaxation, behavioral change, coping style, and other factors important to improvement: Referral placed  Other treatments: She has tried TENS units, chiro and acupuncture in the past; reasonable to trial again, PCP may order  Follow up: As needed    60 minutes spent on the date of encounter doing chart review, history, and exam documentation and further activities as noted above.     Patient was staffed with attending physician, Dr. Toussiant.     Gonzalo Dsouza DO  PM&R Resident  [facility]    I saw and examined the patient with the Pain Fellow/Resident. I have reviewed and agree with the resident's note and plan of care and made changes and corrections directly to the body of the note.    TIME SPENT:  BY FELLOW/RESIDENT ALONE 25 MIN  BY MYSELF AND FELLOW/RESIDENT TOGETHER 35 MIN      Trice Toussaint MD  Pain Medicine, Department of Anesthesiology  , [facility]      Answers submitted by the patient for this visit:  Patient Health Questionnaire (G7) (Submitted on 8/23/2024)  DEEPIKA 7 TOTAL SCORE: 11      Again, thank you for allowing me to participate in the care of your patient.      Sincerely,    Trice Toussaint MD

## 2024-08-23 NOTE — PATIENT INSTRUCTIONS
Will check serum stress hormones.    Echocardiogram  Will schedule an echocardiogram, or ultrasound of the heart.  This looks at the size and function of the heart and valves.  It generally takes about 20-30 minutes.  This will tell if there is any reduced heart function or problem with any of the valves.      Try metoprolol 25mg once daily.  This will slow your heart rate.    You can call the nurse line in 1-2 weeks to report heart rate and symptoms, could then adjust the dose if needed.    To speak to one of our cardiology nurses, call [phone].    To call to schedule any cardiology appointments call [phone].

## 2024-08-23 NOTE — PROGRESS NOTES
Patient is showing 3/5 MNCM met. Not on statin   Chari Picquet, CMA

## 2024-08-26 ENCOUNTER — TELEPHONE (OUTPATIENT)
Dept: ANESTHESIOLOGY | Facility: CLINIC | Age: 34
End: 2024-08-26
Payer: MEDICARE

## 2024-08-26 NOTE — TELEPHONE ENCOUNTER
Patient confirmed scheduled appointment:     Date: 9/23/24  Time: 11:00 AM  Visit type: Return pain  Visit mode: In Person  Provider: Elly Yu  Location: [place]    Additional Notes:   Referred by Dr. Toussaint for medical cannabis evaluation. hope

## 2024-08-27 ENCOUNTER — THERAPY VISIT (OUTPATIENT)
Dept: PHYSICAL THERAPY | Facility: CLINIC | Age: 34
End: 2024-08-27
Attending: ANESTHESIOLOGY
Payer: MEDICARE

## 2024-08-27 DIAGNOSIS — M79.18 MYOFASCIAL PAIN: ICD-10-CM

## 2024-08-27 PROCEDURE — 97110 THERAPEUTIC EXERCISES: CPT | Mod: GP | Performed by: PHYSICAL THERAPIST

## 2024-08-27 PROCEDURE — 97162 PT EVAL MOD COMPLEX 30 MIN: CPT | Mod: GP | Performed by: PHYSICAL THERAPIST

## 2024-08-27 PROCEDURE — 97112 NEUROMUSCULAR REEDUCATION: CPT | Mod: GP | Performed by: PHYSICAL THERAPIST

## 2024-08-27 NOTE — PROGRESS NOTES
"PHYSICAL THERAPY EVALUATION  Type of Visit: Evaluation     Fall Risk Screen:  Fall screen completed by: PT  Have you fallen 2 or more times in the past year?: No  Have you fallen and had an injury in the past year?: No  Is patient a fall risk?: No    Subjective       Presenting condition or subjective complaint:  Widespread pain, polyarthralgia, myofascial pain, muscle pain  Date of onset: 08/23/24 (Pain PT order date)    Relevant medical history:   Rufino Farr is a 33 year old female with PMH of IDDM, anxiety, depression, presenting with a chief pain complaint of chronic muscle pain.      Onset: Pain started at 23 after developing multiple health issues including thyroid, T1DM and PCOS with a hysterectomy. Since then has begun to become more progressive and has more autoimmune issues - she's seeing cardiology to look into POTS. She's had TOS   Location and Radiation: Neck and paraspinals, traps, bilateral shoulders and chest wall, low back at the betline with radiation to bilateral hips, bilateral knees, elbows and hands.       Employment:    Works 3 days/week dog training    Patient goals for therapy:  tolerated daily routine better, decrease pain and fatigue     Objective   LUMBAR SPINE EVALUATION  POSTURE: Standing Posture: Rounded shoulders, Forward head, Lordosis increased, tendency to ant tilt pelvis for stability  GAIT:   Gait Deviations: Antalgic  Stride length decreased, decreased arm swing, trunk rotation  BALANCE/PROPRIOCEPTION: Single Leg Stance Eyes Open (seconds): 5\" R and L  ROM:  lumbar and cervical spine WFL.  Generally hypermobile throughout body  STRENGTH:  able to walk on toes, able to walk on heels but this is more challenging.      Assessment & Plan   CLINICAL IMPRESSIONS  Medical Diagnosis: Myofascial pain    Treatment Diagnosis: Myofascial pain, gait abnormality   Impression/Assessment: Patient is a 33 year old female with polyarthralgia, widespread myofascial, muscle pain complaints.  " The following significant findings have been identified: Pain, Decreased strength, Impaired gait, Impaired muscle performance, Decreased activity tolerance, and Impaired posture. These impairments interfere with their ability to perform self care tasks, work tasks, recreational activities, household chores, and community mobility as compared to previous level of function.     Clinical Decision Making (Complexity):  Clinical Presentation: Evolving/Changing  Clinical Presentation Rationale: based on medical and personal factors listed in PT evaluation  Clinical Decision Making (Complexity): Moderate complexity    PLAN OF CARE  Treatment Interventions:  Interventions: Manual Therapy, Neuromuscular Re-education, Therapeutic Activity, Therapeutic Exercise, Self-Care/Home Management    Long Term Goals     PT Goal 1  Goal Identifier: ambulation  Goal Description: able to consistently ambulate 15 minutes  Rationale: to maximize safety and independence with performance of ADLs and functional tasks;to maximize safety and independence within the home;to maximize safety and independence within the community;to maximize safety and independence with self cares  Target Date: 12/27/24      Frequency of Treatment: 1x wk x 4 wks, 2x month x 3 months  Duration of Treatment: 4 months    Education Assessment:        Risks and benefits of evaluation/treatment have been explained.   Patient/Family/caregiver agrees with Plan of Care.     Evaluation Time:     PT Eval, Moderate Complexity Minutes (98873): 35     Signing Clinician: Manuel Alexander, PT        [facility]                                                                                   OUTPATIENT PHYSICAL THERAPY      PLAN OF TREATMENT FOR OUTPATIENT REHABILITATION   Patient's Last Name, First Name, Rufino Bueno YOB: 1990   Provider's Name   [facility]   Medical Record No.  [id number]     Onset  Date: 08/23/24 (Pain PT order date)  Start of Care Date: 08/27/24     Medical Diagnosis:  Myofascial pain      PT Treatment Diagnosis:  Myofascial pain, gait abnormality Plan of Treatment  Frequency/Duration: 1x wk x 4 wks, 2x month x 3 months/ 4 months    Certification date from 08/27/24 to 11/24/24         See note for plan of treatment details and functional goals     Manuel Alexander, PT                         I CERTIFY THE NEED FOR THESE SERVICES FURNISHED UNDER        THIS PLAN OF TREATMENT AND WHILE UNDER MY CARE     (Physician attestation of this document indicates review and certification of the therapy plan).              Referring Provider:  Trice Toussaint    Initial Assessment  See Epic Evaluation- Start of Care Date: 08/27/24

## 2024-08-28 NOTE — CONFIDENTIAL NOTE
RECORDS RECEIVED FROM: internal    DATE RECEIVED:    NOTES (FOR ALL VISITS) STATUS DETAILS   OFFICE NOTES from referring provider internal  Sofia Anne APRN CNP in  INTERNAL MEDICINE    MEDICATION LIST internal     IMAGING      MRI (BRAIN) internal  5.25.24   CT (HEAD/NECK/CHEST/ABDOMEN) internal  5.25.24   LABS     DIABETES: HBGA1C, CREATININE, FASTING LIPIDS, MICROALBUMIN URINE, POTASSIUM, TSH, T4    THYROID: TSH, T4, CBC, THYRODLONULIN, TOTAL T3, FREE T4, CALCITONIN, CEA internal

## 2024-09-03 ENCOUNTER — LAB (OUTPATIENT)
Dept: LAB | Facility: CLINIC | Age: 34
End: 2024-09-03
Payer: MEDICARE

## 2024-09-03 DIAGNOSIS — R00.0 SINUS TACHYCARDIA: ICD-10-CM

## 2024-09-03 DIAGNOSIS — E06.3 HASHIMOTO'S THYROIDITIS: ICD-10-CM

## 2024-09-03 LAB
T4 FREE SERPL-MCNC: 1.14 NG/DL (ref 0.9–1.7)
TSH SERPL DL<=0.005 MIU/L-ACNC: 9.59 UIU/ML (ref 0.3–4.2)

## 2024-09-03 PROCEDURE — 83835 ASSAY OF METANEPHRINES: CPT

## 2024-09-03 PROCEDURE — 84443 ASSAY THYROID STIM HORMONE: CPT

## 2024-09-03 PROCEDURE — 36415 COLL VENOUS BLD VENIPUNCTURE: CPT

## 2024-09-03 PROCEDURE — 84439 ASSAY OF FREE THYROXINE: CPT

## 2024-09-05 LAB
ANNOTATION COMMENT IMP: NORMAL
METANEPHS SERPL-SCNC: 0.24 NMOL/L
NORMETANEPHRINE SERPL-SCNC: 0.77 NMOL/L

## 2024-09-09 ENCOUNTER — OFFICE VISIT (OUTPATIENT)
Dept: ENDOCRINOLOGY | Facility: CLINIC | Age: 34
End: 2024-09-09
Payer: MEDICARE

## 2024-09-09 ENCOUNTER — PRE VISIT (OUTPATIENT)
Dept: ENDOCRINOLOGY | Facility: CLINIC | Age: 34
End: 2024-09-09

## 2024-09-09 VITALS
BODY MASS INDEX: 30.69 KG/M2 | OXYGEN SATURATION: 100 % | SYSTOLIC BLOOD PRESSURE: 143 MMHG | DIASTOLIC BLOOD PRESSURE: 72 MMHG | HEART RATE: 89 BPM | WEIGHT: 182 LBS

## 2024-09-09 DIAGNOSIS — E06.3 HYPOTHYROIDISM DUE TO HASHIMOTO'S THYROIDITIS: Primary | ICD-10-CM

## 2024-09-09 DIAGNOSIS — R42 DIZZINESS: ICD-10-CM

## 2024-09-09 DIAGNOSIS — E10.9 TYPE 1 DIABETES MELLITUS WITHOUT COMPLICATION (H): ICD-10-CM

## 2024-09-09 PROBLEM — E11.9 DIABETES MELLITUS, TYPE 2 (H): Status: ACTIVE | Noted: 2024-09-09

## 2024-09-09 PROCEDURE — 99205 OFFICE O/P NEW HI 60 MIN: CPT | Performed by: STUDENT IN AN ORGANIZED HEALTH CARE EDUCATION/TRAINING PROGRAM

## 2024-09-09 RX ORDER — LEVOTHYROXINE SODIUM 137 UG/1
137 TABLET ORAL DAILY
Qty: 90 TABLET | Refills: 3 | Status: SHIPPED | OUTPATIENT
Start: 2024-09-09

## 2024-09-09 ASSESSMENT — PAIN SCALES - GENERAL: PAINLEVEL: NO PAIN (0)

## 2024-09-09 NOTE — LETTER
9/9/2024       RE: Rufino Farr  [address]  [place] [address]     Dear Colleague,    Thank you for referring your patient, Rufino Farr, to the [facility] [place] at [facility]. Please see a copy of my visit note below.    Patient is showing 3/5 MNCM met. Not on statin   Chari Smith CMA                                    Endocrinology Clinic Visit 9/9/2024    NAME:  Rufino Farr  PCP:  Sofia Anne  MRN:  [id number]  Reason for Consult: Hashimoto's thyroiditis and ABIODUN  Requesting Provider:  Sofia Anne    Chief Complaint     Chief Complaint   Patient presents with     Diabetes     Thyroid Disease     Hashimotos       History of Present Illness     Rufino Farr is a 34 year old female who is seen in clinic for Hashimoto's thyroiditis and ABIODUN.  She has background history of migraine myofascial pain hypothyroidism due to Hashimoto's thyroiditis and ABIODUN.  She moved recently from [place] to [place] today's heart first visit at the endocrinology clinic.    Diabetes history:  The patient was diagnosed with type 1 diabetes at age of 24 years old    Previous A1C in records reviewed.  A1c 7.7% 7/12/2024 .  Prior to that A1c was 7.5% on 12/5/2023.  Weight is 82 kg     Current diabetes management:  Tandem t:slim X2 manual  , however she did not have Dexcom for the last 2 months .     Diabetes Care/Complications:   Nephropathy: Creatinine 0.87 on 7/12/2024, albuminuria was not checked  Retinopathy: Last visit on 5/26/2024 no diabetic retinopathy.  HLD: No lipid panel in the records.  Neuropathy: No neuropathy diabetic foot exam was done today.  HTN: No history of hypertension.  Gastric emptying study in [place] 2 hours was normal according to her , has an appointment with GI.   Has ongoing nausea and intermittent vomiting , ongoing position lightheadedness, no hx of hyperkalemia or hyponatremia.       Previous DM related Hospitalization:  No hospitalization         Blood Glucose Monitoring:   Did not receive dexcom sensor for 2 months   Does not have glucometer , no BG readings available today         Tandem settings:  Basal:12:00 -12:00 1.0 unit/hour Total 24 units/day   ISF:: 1:40    ICR: 1:8 g CHO  Insulin duration: 5 hours   Target         Hypothyroidism history:  Was diagnosed with hypothyroidism due to Hashimoto's thyroiditis 2011.   Levothyroxine doses:  Levothyroxine 175 mcg 5 days a week every daily dose 125 mcg.  Adherence to medications: she miss one - 2 doses /week , takes it first thing in the morning waits for 30-60 minutes   Current weight:82 kg , she sated she lost weight.  Symptoms of hypothyroidism: feeling Fatigue for last 6 months , hair thinning + dry skin for 6 months , constipation worse for the last 2 weeks , no cold intolerance, not sure about muscle weakness had surgeries in both shoulders   Symptoms of hyperthyroidism:    TFTs:  7/12/2024: TSH 5.46, free T41.17  9/3/2024: TSH 9.59, free T41.14   8/9/2023: TSH was 2.07  5/9/2023 TSH was 0.05, free T4 was 1.6    Problem List     Patient Active Problem List   Diagnosis     Complicated migraine, intractable     Myofascial pain     Diabetes mellitus, type 2 (H)     Type 1 diabetes mellitus without complication (H)     Hypothyroidism due to Hashimoto's thyroiditis        Medications     Current Outpatient Medications   Medication Sig Dispense Refill     acetaminophen (TYLENOL) 325 MG tablet Take 2 tablets (650 mg) by mouth every 4 hours as needed for mild pain or fever       amitriptyline (ELAVIL) 100 MG tablet Take 1 tablet (100 mg) by mouth at bedtime 30 tablet 3     calcium carbonate (TUMS) 500 MG chewable tablet Take 1 tablet (500 mg) by mouth 4 times daily as needed for heartburn       cetirizine (ZYRTEC) 10 MG tablet Take 10 mg by mouth every evening       estradiol (ESTRACE) 1 MG tablet Take 1 tablet (1 mg) by mouth every  evening 30 tablet 3     HUMALOG 100 UNIT/ML injection Inject 1 Units subcutaneously 4 times daily (with meals and nightly) MAX DAILY DOSE OF 65 UNITS VIA INSULIN PUMP 10 mL 3     ibuprofen (ADVIL/MOTRIN) 800 MG tablet Take 1 tablet (800 mg) by mouth every 6 hours as needed for inflammatory pain       indomethacin (INDOCIN) 50 MG capsule Take 100 mg by mouth 2 times daily as needed for moderate pain       levothyroxine (SYNTHROID/LEVOTHROID) 137 MCG tablet Take 1 tablet (137 mcg) by mouth daily. 90 tablet 3     metroNIDAZOLE (METROGEL) 0.75 % vaginal gel Place vaginally twice a week As needed       ondansetron (ZOFRAN ODT) 4 MG ODT tab Take 1 tablet (4 mg) by mouth every 8 hours as needed for nausea 14 tablet 0     tiZANidine (ZANAFLEX) 4 MG tablet Take 1 tablet (4 mg) by mouth 3 times daily 90 tablet 3     blood glucose (NO BRAND SPECIFIED) test strip Use to test blood sugar 4 times daily while off sensor (Patient not taking: Reported on 9/9/2024) 100 strip 11     blood glucose monitoring (NO BRAND SPECIFIED) meter device kit Use to test blood sugar 4 times daily or as directed. (Patient not taking: Reported on 9/9/2024) 1 kit 0     Continuous Glucose Sensor (DEXCOM G6 SENSOR) MISC 1 each every 10 days (Patient not taking: Reported on 9/9/2024) 9 each 3     Continuous Glucose Sensor (DEXCOM G6 SENSOR) MISC Change every 10 days. (Patient not taking: Reported on 9/9/2024) 3 each 5     Continuous Glucose Transmitter (DEXCOM G6 TRANSMITTER) MISC 1 each every 3 months (Patient not taking: Reported on 9/9/2024) 1 each 3     Continuous Glucose Transmitter (DEXCOM G6 TRANSMITTER) MISC Change every 3 months. (Patient not taking: Reported on 9/9/2024) 1 each 1     metoprolol succinate ER (TOPROL XL) 25 MG 24 hr tablet Take 1 tablet (25 mg) by mouth daily. (Patient not taking: Reported on 9/9/2024) 30 tablet 3     No current facility-administered medications for this visit.        Allergies     Allergies   Allergen Reactions      Aspirin Shortness Of Breath       Medical / Surgical History     Past Medical History:   Diagnosis Date     Depression     with Anxiety     Diabetes (H) 2017    Type 1     Hashimoto's thyroiditis 2010     PTSD (post-traumatic stress disorder)      Past Surgical History:   Procedure Laterality Date     HYSTERECTOMY, PAP NO LONGER INDICATED Bilateral        Social History     Social History     Socioeconomic History     Marital status: Single     Spouse name: Not on file     Number of children: Not on file     Years of education: Not on file     Highest education level: Not on file   Occupational History     Not on file   Tobacco Use     Smoking status: Former     Types: Cigarettes     Passive exposure: Past     Smokeless tobacco: Current     Tobacco comments:     Vapes   Vaping Use     Vaping status: Every Day     Substances: Flavoring     Devices: Disposable   Substance and Sexual Activity     Alcohol use: Not Currently     Drug use: Yes     Types: Marijuana     Sexual activity: Not on file   Other Topics Concern     Not on file   Social History Narrative     Not on file     Social Determinants of Health     Financial Resource Strain: Low Risk  (7/12/2024)    Financial Resource Strain      Within the past 12 months, have you or your family members you live with been unable to get utilities (heat, electricity) when it was really needed?: No   Food Insecurity: High Risk (7/12/2024)    Food Insecurity      Within the past 12 months, did you worry that your food would run out before you got money to buy more?: Yes      Within the past 12 months, did the food you bought just not last and you didn t have money to get more?: Yes   Transportation Needs: Low Risk  (7/12/2024)    Transportation Needs      Within the past 12 months, has lack of transportation kept you from medical appointments, getting your medicines, non-medical meetings or appointments, work, or from getting things that you need?: No   Physical Activity:  Insufficiently Active (7/12/2024)    Exercise Vital Sign      Days of Exercise per Week: 3 days      Minutes of Exercise per Session: 20 min   Stress: Stress Concern Present (7/12/2024)    [place] [place] [employer] - Occupational Stress Questionnaire      Feeling of Stress : Rather much   Social Connections: Unknown (7/12/2024)    Social Connection and Isolation Panel [NHANES]      Frequency of Communication with Friends and Family: Not on file      Frequency of Social Gatherings with Friends and Family: Once a week      Attends Islam Services: Not on file      Active Member of Clubs or Organizations: Not on file      Attends Club or Organization Meetings: Not on file      Marital Status: Not on file   Interpersonal Safety: Low Risk  (7/12/2024)    Interpersonal Safety      Do you feel physically and emotionally safe where you currently live?: Yes      Within the past 12 months, have you been hit, slapped, kicked or otherwise physically hurt by someone?: No      Within the past 12 months, have you been humiliated or emotionally abused in other ways by your partner or ex-partner?: No   Housing Stability: Low Risk  (7/12/2024)    Housing Stability      Do you have housing? : Yes      Are you worried about losing your housing?: No       Family History     Family History   Problem Relation Age of Onset     Glaucoma No family hx of      Macular Degeneration No family hx of        ROS     12 ROS completed, pertinent positive and negative in HPI    Physical Exam   BP (!) 143/72   Pulse 89   Wt 82.6 kg (182 lb)   SpO2 100%   BMI 30.69 kg/m       General: Comfortable, no obvious distress, normal body habitus  Eyes: Sclera anicteric, moist conjunctiva  HENT: Atraumatic,   CV: normal rate.   Resp:  good effort, no evidence of loud wheezing   Skin: No rashes, lesions, or subcutaneous nodules on exposed skin.   Psych: Alert and oriented x 3. Appropriate affect, good insight  Extremities: No peripheral  "edema  Neuro: Moves all four extremities. No focal deficits on limited exam.   Foot exam:  Pulses:  Dorsalis pedis: present bilaterally  Posterior tibial: present bilaterally  Foot exam:  Vibration/Light touch: sensation present bilaterally  Skin of foot: intact bilaterally   Labs/Imaging     Pertinent Labs were reviewed and discussed briefly.      Summary of recent findings:   Lab Results   Component Value Date    A1C 7.7 07/12/2024       TSH   Date Value Ref Range Status   09/03/2024 9.59 (H) 0.30 - 4.20 uIU/mL Final   07/12/2024 5.46 (H) 0.30 - 4.20 uIU/mL Final     Free T4   Date Value Ref Range Status   09/03/2024 1.14 0.90 - 1.70 ng/dL Final   07/12/2024 1.17 0.90 - 1.70 ng/dL Final       Creatinine   Date Value Ref Range Status   07/12/2024 0.87 0.51 - 0.95 mg/dL Final       No results for input(s): \"CHOL\", \"HDL\", \"LDL\", \"TRIG\", \"CHOLHDLRATIO\" in the last 15578 hours.    No results found for: \"XPUV54AFOQY\", \"[id number]\", \"[id number]\"    I personally reviewed the patient's outside records from [facility] EMR and Care Everywhere. Summary of pertinent findings in HPI.    Impression / Plan     1.  DM type I:  A1c 7.7% in July 2024.  Tandem t:slim with Dexcom G6 closed-loop with using of control IQ in the past however for the last 2 months did not receive the Dexcom sensors finally it was approved by Medicare and she was supposed to receive them  from the speciality pharmacy but did not receive it yet . Also did not receive the glucometer.   No available BG readings during the encounter today.      She was provided  free samples of Dexcom G7 glucometer and strips today from the clinic.    Plan:  -To contact the specialty pharmacy to get Dexcom G6.  -To update the software for tandem to start using Dexcom G7 until she gets Dexcom G6 from the specialty pharmacy.  -To use glucometer as a backup.  -Urine albumin check.  -Lipid panel.  -Diabetic foot exam  -Follow-up in 3 months    2.  Hypothyroidism due to Hashimoto's " thyroiditis:  Levothyroxine 175 mcg 5 days a week every daily dose 125 mcg.  9/3/2024 TSH elevated 9.59 Free T4 normal 1.14 she stated she misses doses.  Has symptoms suggestive of hypothyroidism.    Plan:  -To increase the dose to 137 mcg daily  -Repeat TFTs in 3 months prior to the next visit    3.  Dizziness/lightheadedness:  Has ongoing positional lightheadedness fatigability for the last 6 months and ongoing nausea with intermittent vomiting.  No previous assessment for AI.  Given history of type I DM and hypothyroidism due to Hashimoto's thyroiditis will assess for AI.    Plan:  -To get a.m. cortisol/ACTH      Test and/or medications prescribed today:  Orders Placed This Encounter   Procedures     Cortisol     Adrenal corticotropin     Lipid Profile     T4 free     TSH     Albumin Random Urine Quantitative with Creat Ratio         Follow up: 3 months      Ana Love MD  Endocrinology, Diabetes and Metabolism  [facility]  60 minutes spent on the date of the encounter doing chart review, history and exam, documentation and further activities per the note       Again, thank you for allowing me to participate in the care of your patient.      Sincerely,    DARLIN Payne

## 2024-09-09 NOTE — PATIENT INSTRUCTIONS
- To get morning labs at 08:00-09:00 cortisol and ACTH if free next week any day in the morning.   - To get thyroid function test after three months   - Will see you back in 3 months                     Imaging (DEXA, CT, MRI, XRAY)    [facility] ([facility] [place]) [phone]   [facility] ([place], [place]) [phone]   [facility] ([facility]) [phone]   [facility] ([facility]) [phone]       Lab    General [phone]   [facility] [phone]   [place] [phone]   [facility]  [phone]   [facility] [phone]   [place] [phone]   [facility]) [phone]   [facility] Walk-In Only   [place] [phone]   [place] [phone]   [place] [phone]   [place] [phone]       Infusion    [facility] [phone]   [place] [phone]   [place] [phone]   [place] [phone]   [place] [phone]   [place] [phone]   [facility] [phone]     For any questions, please reach out to the Endocrinology Clinic Number for assistance: [phone].

## 2024-09-09 NOTE — NURSING NOTE
"Chief Complaint   Patient presents with    Diabetes    Thyroid Disease     Hashimotos     Vital signs:      BP: (!) 143/72 Pulse: 89     SpO2: 100 %       Weight: 82.6 kg (182 lb)  Estimated body mass index is 30.69 kg/m  as calculated from the following:    Height as of 8/23/24: 1.64 m (5' 4.57\").    Weight as of this encounter: 82.6 kg (182 lb).        "

## 2024-09-09 NOTE — PROGRESS NOTES
Endocrinology Clinic Visit 9/9/2024    NAME:  Rufino Farr  PCP:  Sofia Anne  MRN:  [id number]  Reason for Consult: Hashimoto's thyroiditis and ABIODUN  Requesting Provider:  Sofia Anne    Chief Complaint     Chief Complaint   Patient presents with    Diabetes    Thyroid Disease     Hashimotos       History of Present Illness     Rufino Farr is a 34 year old female who is seen in clinic for Hashimoto's thyroiditis and ABIODUN.  She has background history of migraine myofascial pain hypothyroidism due to Hashimoto's thyroiditis and ABIODUN.  She moved recently from [place] to [place] today's heart first visit at the endocrinology clinic.    Diabetes history:  The patient was diagnosed with type 1 diabetes at age of 24 years old    Previous A1C in records reviewed.  A1c 7.7% 7/12/2024 .  Prior to that A1c was 7.5% on 12/5/2023.  Weight is 82 kg     Current diabetes management:  Tandem t:slim X2 manual  , however she did not have Dexcom for the last 2 months .     Diabetes Care/Complications:   Nephropathy: Creatinine 0.87 on 7/12/2024, albuminuria was not checked  Retinopathy: Last visit on 5/26/2024 no diabetic retinopathy.  HLD: No lipid panel in the records.  Neuropathy: No neuropathy diabetic foot exam was done today.  HTN: No history of hypertension.  Gastric emptying study in [place] 2 hours was normal according to her , has an appointment with GI.   Has ongoing nausea and intermittent vomiting , ongoing position lightheadedness, no hx of hyperkalemia or hyponatremia.      Previous DM related Hospitalization:  No hospitalization         Blood Glucose Monitoring:   Did not receive dexcom sensor for 2 months   Does not have glucometer , no BG readings available today         Tandem settings:  Basal:12:00 -12:00 1.0 unit/hour Total 24 units/day   ISF:: 1:40    ICR: 1:8 g CHO  Insulin duration: 5 hours   Target         Hypothyroidism history:  Was diagnosed with hypothyroidism due to Hashimoto's  thyroiditis 2011.   Levothyroxine doses:  Levothyroxine 175 mcg 5 days a week every daily dose 125 mcg.  Adherence to medications: she miss one - 2 doses /week , takes it first thing in the morning waits for 30-60 minutes   Current weight:82 kg , she sated she lost weight.  Symptoms of hypothyroidism: feeling Fatigue for last 6 months , hair thinning + dry skin for 6 months , constipation worse for the last 2 weeks , no cold intolerance, not sure about muscle weakness had surgeries in both shoulders   Symptoms of hyperthyroidism:    TFTs:  7/12/2024: TSH 5.46, free T41.17  9/3/2024: TSH 9.59, free T41.14   8/9/2023: TSH was 2.07  5/9/2023 TSH was 0.05, free T4 was 1.6    Problem List     Patient Active Problem List   Diagnosis    Complicated migraine, intractable    Myofascial pain    Diabetes mellitus, type 2 (H)    Type 1 diabetes mellitus without complication (H)    Hypothyroidism due to Hashimoto's thyroiditis        Medications     Current Outpatient Medications   Medication Sig Dispense Refill    acetaminophen (TYLENOL) 325 MG tablet Take 2 tablets (650 mg) by mouth every 4 hours as needed for mild pain or fever      amitriptyline (ELAVIL) 100 MG tablet Take 1 tablet (100 mg) by mouth at bedtime 30 tablet 3    calcium carbonate (TUMS) 500 MG chewable tablet Take 1 tablet (500 mg) by mouth 4 times daily as needed for heartburn      cetirizine (ZYRTEC) 10 MG tablet Take 10 mg by mouth every evening      estradiol (ESTRACE) 1 MG tablet Take 1 tablet (1 mg) by mouth every evening 30 tablet 3    HUMALOG 100 UNIT/ML injection Inject 1 Units subcutaneously 4 times daily (with meals and nightly) MAX DAILY DOSE OF 65 UNITS VIA INSULIN PUMP 10 mL 3    ibuprofen (ADVIL/MOTRIN) 800 MG tablet Take 1 tablet (800 mg) by mouth every 6 hours as needed for inflammatory pain      indomethacin (INDOCIN) 50 MG capsule Take 100 mg by mouth 2 times daily as needed for moderate pain      levothyroxine (SYNTHROID/LEVOTHROID) 137 MCG  tablet Take 1 tablet (137 mcg) by mouth daily. 90 tablet 3    metroNIDAZOLE (METROGEL) 0.75 % vaginal gel Place vaginally twice a week As needed      ondansetron (ZOFRAN ODT) 4 MG ODT tab Take 1 tablet (4 mg) by mouth every 8 hours as needed for nausea 14 tablet 0    tiZANidine (ZANAFLEX) 4 MG tablet Take 1 tablet (4 mg) by mouth 3 times daily 90 tablet 3    blood glucose (NO BRAND SPECIFIED) test strip Use to test blood sugar 4 times daily while off sensor (Patient not taking: Reported on 9/9/2024) 100 strip 11    blood glucose monitoring (NO BRAND SPECIFIED) meter device kit Use to test blood sugar 4 times daily or as directed. (Patient not taking: Reported on 9/9/2024) 1 kit 0    Continuous Glucose Sensor (DEXCOM G6 SENSOR) MISC 1 each every 10 days (Patient not taking: Reported on 9/9/2024) 9 each 3    Continuous Glucose Sensor (DEXCOM G6 SENSOR) MISC Change every 10 days. (Patient not taking: Reported on 9/9/2024) 3 each 5    Continuous Glucose Transmitter (DEXCOM G6 TRANSMITTER) MISC 1 each every 3 months (Patient not taking: Reported on 9/9/2024) 1 each 3    Continuous Glucose Transmitter (DEXCOM G6 TRANSMITTER) MISC Change every 3 months. (Patient not taking: Reported on 9/9/2024) 1 each 1    metoprolol succinate ER (TOPROL XL) 25 MG 24 hr tablet Take 1 tablet (25 mg) by mouth daily. (Patient not taking: Reported on 9/9/2024) 30 tablet 3     No current facility-administered medications for this visit.        Allergies     Allergies   Allergen Reactions    Aspirin Shortness Of Breath       Medical / Surgical History     Past Medical History:   Diagnosis Date    Depression     with Anxiety    Diabetes (H) 2017    Type 1    Hashimoto's thyroiditis 2010    PTSD (post-traumatic stress disorder)      Past Surgical History:   Procedure Laterality Date    HYSTERECTOMY, PAP NO LONGER INDICATED Bilateral        Social History     Social History     Socioeconomic History    Marital status: Single     Spouse name: Not  on file    Number of children: Not on file    Years of education: Not on file    Highest education level: Not on file   Occupational History    Not on file   Tobacco Use    Smoking status: Former     Types: Cigarettes     Passive exposure: Past    Smokeless tobacco: Current    Tobacco comments:     Vapes   Vaping Use    Vaping status: Every Day    Substances: Flavoring    Devices: Disposable   Substance and Sexual Activity    Alcohol use: Not Currently    Drug use: Yes     Types: Marijuana    Sexual activity: Not on file   Other Topics Concern    Not on file   Social History Narrative    Not on file     Social Determinants of Health     Financial Resource Strain: Low Risk  (7/12/2024)    Financial Resource Strain     Within the past 12 months, have you or your family members you live with been unable to get utilities (heat, electricity) when it was really needed?: No   Food Insecurity: High Risk (7/12/2024)    Food Insecurity     Within the past 12 months, did you worry that your food would run out before you got money to buy more?: Yes     Within the past 12 months, did the food you bought just not last and you didn t have money to get more?: Yes   Transportation Needs: Low Risk  (7/12/2024)    Transportation Needs     Within the past 12 months, has lack of transportation kept you from medical appointments, getting your medicines, non-medical meetings or appointments, work, or from getting things that you need?: No   Physical Activity: Insufficiently Active (7/12/2024)    Exercise Vital Sign     Days of Exercise per Week: 3 days     Minutes of Exercise per Session: 20 min   Stress: Stress Concern Present (7/12/2024)    [employer] - Occupational Stress Questionnaire     Feeling of Stress : Rather much   Social Connections: Unknown (7/12/2024)    Social Connection and Isolation Panel [NHANES]     Frequency of Communication with Friends and Family: Not on file     Frequency of Social  Gatherings with Friends and Family: Once a week     Attends L[employer]-day Services: Not on file     Active Member of Clubs or Organizations: Not on file     Attends Club or Organization Meetings: Not on file     Marital Status: Not on file   Interpersonal Safety: Low Risk  (7/12/2024)    Interpersonal Safety     Do you feel physically and emotionally safe where you currently live?: Yes     Within the past 12 months, have you been hit, slapped, kicked or otherwise physically hurt by someone?: No     Within the past 12 months, have you been humiliated or emotionally abused in other ways by your partner or ex-partner?: No   Housing Stability: Low Risk  (7/12/2024)    Housing Stability     Do you have housing? : Yes     Are you worried about losing your housing?: No       Family History     Family History   Problem Relation Age of Onset    Glaucoma No family hx of     Macular Degeneration No family hx of        ROS     12 ROS completed, pertinent positive and negative in HPI    Physical Exam   BP (!) 143/72   Pulse 89   Wt 82.6 kg (182 lb)   SpO2 100%   BMI 30.69 kg/m       General: Comfortable, no obvious distress, normal body habitus  Eyes: Sclera anicteric, moist conjunctiva  HENT: Atraumatic,   CV: normal rate.   Resp:  good effort, no evidence of loud wheezing   Skin: No rashes, lesions, or subcutaneous nodules on exposed skin.   Psych: Alert and oriented x 3. Appropriate affect, good insight  Extremities: No peripheral edema  Neuro: Moves all four extremities. No focal deficits on limited exam.   Foot exam:  Pulses:  Dorsalis pedis: present bilaterally  Posterior tibial: present bilaterally  Foot exam:  Vibration/Light touch: sensation present bilaterally  Skin of foot: intact bilaterally   Labs/Imaging     Pertinent Labs were reviewed and discussed briefly.      Summary of recent findings:   Lab Results   Component Value Date    A1C 7.7 07/12/2024       TSH   Date Value Ref Range Status   09/03/2024 9.59 (H) 0.30  "- 4.20 uIU/mL Final   07/12/2024 5.46 (H) 0.30 - 4.20 uIU/mL Final     Free T4   Date Value Ref Range Status   09/03/2024 1.14 0.90 - 1.70 ng/dL Final   07/12/2024 1.17 0.90 - 1.70 ng/dL Final       Creatinine   Date Value Ref Range Status   07/12/2024 0.87 0.51 - 0.95 mg/dL Final       No results for input(s): \"CHOL\", \"HDL\", \"LDL\", \"TRIG\", \"CHOLHDLRATIO\" in the last 82802 hours.    No results found for: \"UKTK35GJSIH\", \"[id number]\", \"[id number]\"    I personally reviewed the patient's outside records from [facility] and Care Everywhere. Summary of pertinent findings in HPI.    Impression / Plan     1.  DM type I:  A1c 7.7% in July 2024.  Tandem t:slim with Dexcom G6 closed-loop with using of control IQ in the past however for the last 2 months did not receive the Dexcom sensors finally it was approved by Medicare and she was supposed to receive them  from the speciality pharmacy but did not receive it yet . Also did not receive the glucometer.   No available BG readings during the encounter today.      She was provided  free samples of Dexcom G7 glucometer and strips today from the clinic.    Plan:  -To contact the specialty pharmacy to get Dexcom G6.  -To update the software for tandem to start using Dexcom G7 until she gets Dexcom G6 from the specialty pharmacy.  -To use glucometer as a backup.  -Urine albumin check.  -Lipid panel.  -Diabetic foot exam  -Follow-up in 3 months    2.  Hypothyroidism due to Hashimoto's thyroiditis:  Levothyroxine 175 mcg 5 days a week every daily dose 125 mcg.  9/3/2024 TSH elevated 9.59 Free T4 normal 1.14 she stated she misses doses.  Has symptoms suggestive of hypothyroidism.    Plan:  -To increase the dose to 137 mcg daily  -Repeat TFTs in 3 months prior to the next visit    3.  Dizziness/lightheadedness:  Has ongoing positional lightheadedness fatigability for the last 6 months and ongoing nausea with intermittent vomiting.  No previous assessment for AI.  Given history of type I " DM and hypothyroidism due to Hashimoto's thyroiditis will assess for AI.    Plan:  -To get a.m. cortisol/ACTH      Test and/or medications prescribed today:  Orders Placed This Encounter   Procedures    Cortisol    Adrenal corticotropin    Lipid Profile    T4 free    TSH    Albumin Random Urine Quantitative with Creat Ratio         Follow up: 3 months      Ana Love MD  Endocrinology, Diabetes and Metabolism  [facility]  60 minutes spent on the date of the encounter doing chart review, history and exam, documentation and further activities per the note

## 2024-09-12 ENCOUNTER — TELEPHONE (OUTPATIENT)
Dept: ENDOCRINOLOGY | Facility: CLINIC | Age: 34
End: 2024-09-12

## 2024-09-12 ENCOUNTER — VIRTUAL VISIT (OUTPATIENT)
Dept: GASTROENTEROLOGY | Facility: CLINIC | Age: 34
End: 2024-09-12
Payer: MEDICARE

## 2024-09-12 ENCOUNTER — PRE VISIT (OUTPATIENT)
Dept: GASTROENTEROLOGY | Facility: CLINIC | Age: 34
End: 2024-09-12

## 2024-09-12 DIAGNOSIS — R19.8 IRREGULAR BOWEL HABITS: ICD-10-CM

## 2024-09-12 DIAGNOSIS — E10.9 TYPE 1 DIABETES MELLITUS WITHOUT COMPLICATION (H): Primary | ICD-10-CM

## 2024-09-12 DIAGNOSIS — R10.9 GASTRIC PAIN: ICD-10-CM

## 2024-09-12 DIAGNOSIS — R14.0 ABDOMINAL BLOATING: ICD-10-CM

## 2024-09-12 DIAGNOSIS — R10.9 ABDOMINAL CRAMPING: Primary | ICD-10-CM

## 2024-09-12 DIAGNOSIS — R11.2 NAUSEA AND VOMITING, UNSPECIFIED VOMITING TYPE: ICD-10-CM

## 2024-09-12 PROCEDURE — 99205 OFFICE O/P NEW HI 60 MIN: CPT | Mod: 95 | Performed by: STUDENT IN AN ORGANIZED HEALTH CARE EDUCATION/TRAINING PROGRAM

## 2024-09-12 PROCEDURE — 99417 PROLNG OP E/M EACH 15 MIN: CPT | Mod: 95 | Performed by: STUDENT IN AN ORGANIZED HEALTH CARE EDUCATION/TRAINING PROGRAM

## 2024-09-12 RX ORDER — INSULIN PUMP CARTRIDGE
1 CARTRIDGE (EA) SUBCUTANEOUS
Qty: 10 EACH | Refills: 11 | Status: SHIPPED | OUTPATIENT
Start: 2024-09-12

## 2024-09-12 RX ORDER — ACYCLOVIR 400 MG/1
TABLET ORAL
Qty: 3 EACH | Refills: 5 | Status: SHIPPED | OUTPATIENT
Start: 2024-09-12 | End: 2024-09-19

## 2024-09-12 RX ORDER — INFUSION SET FOR INSULIN PUMP
1 INFUSION SETS-PARAPHERNALIA MISCELLANEOUS
Qty: 10 EACH | Refills: 11 | Status: SHIPPED | OUTPATIENT
Start: 2024-09-12

## 2024-09-12 NOTE — TELEPHONE ENCOUNTER
Left Voicemail (1st Attempt) for the patient to call back and schedule the following:    Appointment type: Labs   Provider: Ivan   Return date: Next avail 8 am-9 am labs   Specialty phone number: [phone]  Additional appointment(s) needed: Labs prior to follow-up on 12/9   Additonal Notes: LVM, MyC x1   Check Out Comments from 9/9 appt:   1. Labs next week morning 08:00-09:00 am for cortisol and ACTH   2. Lab appointment in 3 months before the next visit     Julia Castro on 9/12/2024 at 3:25 PM

## 2024-09-12 NOTE — NURSING NOTE
Current patient location: [address] [place] [address] [address]   [facility] [id number]    Is the patient currently in the state of [place]? YES    Visit mode:VIDEO    If the visit is dropped, the patient can be reconnected by: VIDEO VISIT: Text to cell phone:   Telephone Information:   Mobile [phone]       Will anyone else be joining the visit? NO  (If patient encounters technical issues they should call [phone]    How would you like to obtain your AVS? MyChart    Are changes needed to the allergy or medication list? No    Are refills needed on medications prescribed by this physician? NO    Rooming Documentation:  Questionnaire(s) completed      Reason for visit: Consult    Kingsley MORENO

## 2024-09-12 NOTE — PATIENT INSTRUCTIONS
It was a pleasure meeting with you today and discussing your healthcare plan. Below is a summary of what we covered:    - Upper endoscopy ordered: you will get a call to schedule this   - Abdominal xray ordered: call [phone] to schedule   - You may start on a gastroparesis diet now: this is a low fat, low fiber diet  - Labs ordered for celiac disease: call [phone]  - I will talk with your pain provider regarding rheumatology workup  - Follow up in 2-3 months       Please see below for any additional questions and scheduling guidelines.    Sign up for Voice[employer]: Voice[employer] patient portal serves as a secure platform for accessing your medical records from the [facility]. Additionally, Voice[employer] facilitates easy, timely, and secure messaging with your care team. If you have not signed up, you may do so by using the provided code or calling [phone].    Coordinating your care after your visit:  There are multiple options for scheduling your follow-up care based on your provider's recommendation.    How do I schedule a follow-up clinic appointment:   After your appointment, you may receive scheduling assistance with the Clinic Coordinators by having a seat in the waiting room and a Clinic Coordinator will call you up to schedule.  Virtual visits or after you leave the clinic:  Your provider has placed a follow-up order in the Voice[employer] portal for scheduling your return appointment. A member of the scheduling team will contact you to schedule.  [facility] Scheduling: Timely scheduling through Voice[employer] is advised to ensure appointment availability.   Call to schedule: You may schedule your follow-up appointment(s) by calling [phone], option 1.    How do I schedule my endoscopy or colonoscopy procedure:  If a procedure, such as a colonoscopy or upper endoscopy was ordered by your provider, the scheduling team will contact you to schedule this procedure. Or you may choose to call to schedule at    [phone], option 2.  Please allow 20-30 minutes when scheduling a procedure.    How do I get my blood work done? To get your blood work done, you need to schedule a lab appointment at an [facility]. There are multiple ways to schedule:   At the clinic: The Clinic Coordinator you meet after your visit can help you schedule a lab appointment.   [facility] scheduling: [facility] offers online lab scheduling at all [facility] laboratory locations.   Call to schedule: You can call [phone] to schedule your lab appointment.    How do I schedule my imaging study: To schedule imaging studies, such as CT scans, ultrasounds, MRIs, or X-rays, contact Imaging Services at [phone].    How do I schedule a referral to another doctor: If your provider recommended a referral to another specialist(s), the referral order was placed by your provider. You will receive a phone call to schedule this referral, or you may choose to call the number attached to the referral to self-schedule.    For Post-Visit Question(s):  For any inquiries following today's visit:  Please utilize [facility] [employer] [facility] messaging and allow 48 hours for reply or contact the Call Center during normal business hours at [phone], option 3.  For Emergent After-hours questions, contact the On-Call GI Fellow through the [facility]  at [phone].  In addition, you may contact your Nurse directly using the provided contact information.    Test Results:  Test results will be accessible via [facility] [employer] [facility] in compliance with the 21st Century Cures Act. This means that your results will be available to you at the same time as your provider. Often you may see your results before your provider does. Results are reviewed by staff within two weeks with communication follow-up. Results may be released in the patient portal prior to your care team review.    Prescription Refill(s):  Medication prescribed by your provider will be addressed  during your visit. For future refills, please coordinate with your pharmacy. If you have not had a recent clinic visit or routine labs, for your safety, your provider may not be able to refill your prescription.

## 2024-09-12 NOTE — PROGRESS NOTES
GASTROENTEROLOGY NEW PATIENT VIDEO VISIT      Virtual Visit Details    Type of service:  Video Visit   Video Start Time: 10:58 AM  Video End Time: 11:47 AM    Originating Location (pt. Location): Home    Distant Location (provider location):  Off-site  Platform used for Video Visit: Juana COSTA/REFERRING MD:    Sofia Anne    REASON FOR CONSULTATION:   Sofia Anne for   Chief Complaint   Patient presents with    Consult       HISTORY OF PRESENT ILLNESS:    Rufino Farr is a 34 year old female with a past medical history significant for type 1 diabetes, migraines, hypothyroidism who is being evaluated via a billable video visit for nausea and vomiting.     She recently moved from [place] to [place] and had a history of nausea, vomiting, gastroparesis.     She had a 2 hour gastric emptying study in 2021 which was normal. Colonoscopy in 2016 was normal, no specimens collected.     EGD 3/22/2022: normal to the second portion of the duodenum. Diffuse granular mucosa seen in gastric body on biopsy report.     Today, Rufino reports over the past 10 years she has had a lot of chronic health issues which started with thyroid disease, traumatic birth of her daughter, type 1 diabetes. She has had intermittent GI issues throughout this time. Initially with constipation and diarrhea when thyroid was uncontrolled. This eventually stabilized. With her last colonoscopy, there was some inflammation but dx with IBS.     Now she has been dealing with intermittent vomiting in the middle of the night for the last 2 years more consistently, preogressively worsening. Intiailly was 1-2x per month, now having vomiting at night a few nights in a row almost weekly. Reports she is retching so hard she has burst blood vessels in her eyes. She has monitored food she is eating. Sees undigested food in the vomit 3-4 hours after eating, or even up to 8 hours later. Other times will just have liquid emesis.  She is not sure if there has been blood in the vomit. A couple of times she has overeaten which she feels triggered the vomiting. Reports she is not feeling hunger or full sensation.     Reports constant nausea on some days. This is also intermittent. She feels nauseous in the morning. She has tried drinking protein drinks, electrolytes, crackers in the morning but nothing seems to help. She has tried zofran but would like to get to the bottom of it.     Denies acid reflux if she doesn't eat any red sauce. She stopped omeprazole due to concern for long term effects. Feels this is well managed.     Bowel movements have been constipated lately. Currently in hypothyroid state. Drinks a lot of water and electrolytes. When constipated, she will skip 1-2 days without a bowel movement. Even when she is having daily bowel movements, she will have bristol type 4 stools. She feels her colon has a slower transit time. Denies blood in her stool. Reports bloating, abdominal cramping. This seems to correlate with bowel movements and improves a couple of minutes after a BM. Has some small hemorrhoids at times which respond to tucks pads or ointment after a couple of days.     Was on abx in march for sinus infection.    Reports stiffness in the morning. Has hypermobile joints. Describes tightness in shoulder, hips. Movement and stretching helps. Made worse by sitting too long or moving too long. Reports widespread pain daily.      NSAID use- indomethacin daily, ibuprofen 1200 mg once daily - this is for joint pain and muscle pain and tension. She is seeing PT for this. Saw rheumatology in [place] butn didn't have a workup for rheum at the time.     Family history - mom and maternal grandfather have ulcerative colitis, maternal grandmother with pancreatic cancer  Alcohol use- minimal   Marijuana use- twice per day for nausea and anxiety. Working on getting medical card.   Hx abd surgeries- cholecystectomy, hysterectomy      Wt Readings  from Last 10 Encounters:   09/09/24 82.6 kg (182 lb)   08/23/24 80.3 kg (177 lb)   07/12/24 83.9 kg (185 lb)   05/25/24 86.2 kg (190 lb)        I have reviewed and updated the patient's Past Medical History, Social History, Family History and Medication List.    Exam:    General appearance:  Healthy appearing adult, in no acute distress  Eyes:  Sclera anicteric  Ears, nose, mouth and throat:  No obvious external lesions of ears and nose.  Hearing intact  Neck:  Symmetric, No obvious external lesions  Respiratory:  Normal respiration, no use of accessory muscles   MSK:  No visual upper extremity, neck or facial muscle atrophy  Psychiatric:  Oriented to person, place and time, Appropriate mood and affect.   Neurologic:  Peripheral muscle function and dexterity appear to be intact      PERTINENT STUDIES have been reviewed.      ASSESSMENT/PLAN:    Rufino Farr is a 34 year old female who presents for evaluation of nausea and vomiting, abdominal cramping, abdominal bloating, irregular bowel habits     1. Gastric pain  2. Nausea and vomiting, unspecified vomiting type  3. Abdominal cramping  4. Abdominal bloating  5. Irregular bowel habits    34 year old female with type 1 diabetes, migraines, hypothyroidism who has had intermittent nausea and vomiting over the past several years. She reports vomiting undigested food a few nights in a row almost weekly. She feels she doesn't feel hunger cues or fullness cues. She has seen GI in [place] in the past and had  a 2 hour gastric emptying study that was normal in 2021. She uses marijuana 2 times per day. She has had an EGD in 3/2023 which was normal to the second portion of the duodenum. Diffuse granular mucosa seen in gastric body on biopsy report. We do not have these complete records but she has them with her at the time of today's video visit.     She also takes indomethacin daily along with 1200 mg of ibuprofen daily for diffuse joint and muscle pain. She describes morning  stiffness. I don't see that she has had a rheumatology workup. She did meet with rheumatology in [place] and reports being told this was likely not related to rheum disorder. She is scheduled to see pain medicine soon and I will collaborate with them to see if that is a workup they feel is warranted through their dept or if she should start with her PCP vs. Rheum referral. We reviewed the inflammatory effects of NSAIDs on the bowels.     She has a family history of ulcerative colitis in her mother and maternal grandmother. She has a history of alternating between constipation and diarrhea. No blood in the stool.    I recommend an EGD as nausea and vomiting has worsened. She has a nickel allergy so not able to do Bravo. Could consider high res esophageal manometry with pH in the future. We reviewed potential causes of nausea and vomiting being structural abnormality/gastric outlet obstruction, cannabis hyperemesis, celiac disease, H pylori, gastroparesis related to type 1 diabetes, constipation. Discussed importance of ruling out constipation prior to proceeding with NM GES. Abdominal xray ordered. It may also be necessary to stop marijuana use for 6 months prior to 4 hour gastric emptying study for most accurate results in the future. Provided her with instructions on gastroparesis diet which she could start now.    We reviewed the following plan:   - Upper endoscopy ordered: you will get a call to schedule this   - Abdominal xray ordered: call [phone] to schedule   - You may start on a gastroparesis diet now: this is a low fat, low fiber diet  - Labs ordered for celiac disease: call [phone]  - I will collaborate with your pain provider regarding rheumatology workup  - Follow up in 2-3 months       - Adult GI  Referral - Consult Only  - XR Abdomen 1 View; Future  - Adult GI  Referral - Procedure Only; Future  - Adult GI Clinic Follow-Up Order (Blank); Future  - IgA [LAB73]; Future  - Tissue  transglutaminase chip IgA and IgG [[id number]]; Future      Video-Visit Details  Video Visit Time: 49 minutes  Type of service:  Video Visit  Originating Location (pt. Location): Home    Distant Location (provider location):  Off-site  Platform used for Video Visit: Juana Lawrence PA-C    RTC 3 months    Thank you for this consultation.  It was a pleasure to participate in the care of this patient; please contact us with any further questions. 78 minutes spent on the date of the encounter doing chart review, history and exam, documentation and further activities as noted above.    Taylor Lawrence PA-C  Division of Gastroenterology, Hepatology and Nutrition  [facility]

## 2024-09-12 NOTE — LETTER
9/12/2024      Rufino Farr  [address]  [facility] [address]      Dear Colleague,    Thank you for referring your patient, Rufino Farr, to the [facility] [place]. Please see a copy of my visit note below.          GASTROENTEROLOGY NEW PATIENT VIDEO VISIT      Virtual Visit Details    Type of service:  Video Visit   Video Start Time: 10:58 AM  Video End Time: 11:47 AM    Originating Location (pt. Location): Home    Distant Location (provider location):  Off-site  Platform used for Video Visit: Juana COSTA/REFERRING MD:    Sofia Anne    REASON FOR CONSULTATION:   Sofia Anne for   Chief Complaint   Patient presents with     Consult       HISTORY OF PRESENT ILLNESS:    Rufino Farr is a 34 year old female with a past medical history significant for type 1 diabetes, migraines, hypothyroidism who is being evaluated via a billable video visit for nausea and vomiting.     She recently moved from [place] to [place] and had a history of nausea, vomiting, gastroparesis.     She had a 2 hour gastric emptying study in 2021 which was normal. Colonoscopy in 2016 was normal, no specimens collected.     EGD 3/22/2022: normal to the second portion of the duodenum. Diffuse granular mucosa seen in gastric body on biopsy report.     Today, Rufino reports over the past 10 years she has had a lot of chronic health issues which started with thyroid disease, traumatic birth of her daughter, type 1 diabetes. She has had intermittent GI issues throughout this time. Initially with constipation and diarrhea when thyroid was uncontrolled. This eventually stabilized. With her last colonoscopy, there was some inflammation but dx with IBS.     Now she has been dealing with intermittent vomiting in the middle of the night for the last 2 years more consistently, preogressively worsening. Intiailly was 1-2x per month, now having vomiting at night a few nights in  a row almost weekly. Reports she is retching so hard she has burst blood vessels in her eyes. She has monitored food she is eating. Sees undigested food in the vomit 3-4 hours after eating, or even up to 8 hours later. Other times will just have liquid emesis. She is not sure if there has been blood in the vomit. A couple of times she has overeaten which she feels triggered the vomiting. Reports she is not feeling hunger or full sensation.     Reports constant nausea on some days. This is also intermittent. She feels nauseous in the morning. She has tried drinking protein drinks, electrolytes, crackers in the morning but nothing seems to help. She has tried zofran but would like to get to the bottom of it.     Denies acid reflux if she doesn't eat any red sauce. She stopped omeprazole due to concern for long term effects. Feels this is well managed.     Bowel movements have been constipated lately. Currently in hypothyroid state. Drinks a lot of water and electrolytes. When constipated, she will skip 1-2 days without a bowel movement. Even when she is having daily bowel movements, she will have bristol type 4 stools. She feels her colon has a slower transit time. Denies blood in her stool. Reports bloating, abdominal cramping. This seems to correlate with bowel movements and improves a couple of minutes after a BM. Has some small hemorrhoids at times which respond to tucks pads or ointment after a couple of days.     Was on abx in march for sinus infection.    Reports stiffness in the morning. Has hypermobile joints. Describes tightness in shoulder, hips. Movement and stretching helps. Made worse by sitting too long or moving too long. Reports widespread pain daily.      NSAID use- indomethacin daily, ibuprofen 1200 mg once daily - this is for joint pain and muscle pain and tension. She is seeing PT for this. Saw rheumatology in [place] butn didn't have a workup for rheum at the time.     Family history - mom and  maternal grandfather have ulcerative colitis, maternal grandmother with pancreatic cancer  Alcohol use- minimal   Marijuana use- twice per day for nausea and anxiety. Working on getting medical card.   Hx abd surgeries- cholecystectomy, hysterectomy      Wt Readings from Last 10 Encounters:   09/09/24 82.6 kg (182 lb)   08/23/24 80.3 kg (177 lb)   07/12/24 83.9 kg (185 lb)   05/25/24 86.2 kg (190 lb)        I have reviewed and updated the patient's Past Medical History, Social History, Family History and Medication List.    Exam:    General appearance:  Healthy appearing adult, in no acute distress  Eyes:  Sclera anicteric  Ears, nose, mouth and throat:  No obvious external lesions of ears and nose.  Hearing intact  Neck:  Symmetric, No obvious external lesions  Respiratory:  Normal respiration, no use of accessory muscles   MSK:  No visual upper extremity, neck or facial muscle atrophy  Psychiatric:  Oriented to person, place and time, Appropriate mood and affect.   Neurologic:  Peripheral muscle function and dexterity appear to be intact      PERTINENT STUDIES have been reviewed.      ASSESSMENT/PLAN:    Rufino Farr is a 34 year old female who presents for evaluation of nausea and vomiting, abdominal cramping, abdominal bloating, irregular bowel habits     1. Gastric pain  2. Nausea and vomiting, unspecified vomiting type  3. Abdominal cramping  4. Abdominal bloating  5. Irregular bowel habits    34 year old female with type 1 diabetes, migraines, hypothyroidism who has had intermittent nausea and vomiting over the past several years. She reports vomiting undigested food a few nights in a row almost weekly. She feels she doesn't feel hunger cues or fullness cues. She has seen GI in [place] in the past and had  a 2 hour gastric emptying study that was normal in 2021. She uses marijuana 2 times per day. She has had an EGD in 3/2023 which was normal to the second portion of the duodenum. Diffuse granular mucosa seen  in gastric body on biopsy report. We do not have these complete records but she has them with her at the time of today's video visit.     She also takes indomethacin daily along with 1200 mg of ibuprofen daily for diffuse joint and muscle pain. She describes morning stiffness. I don't see that she has had a rheumatology workup. She did meet with rheumatology in [place] and reports being told this was likely not related to rheum disorder. She is scheduled to see pain medicine soon and I will collaborate with them to see if that is a workup they feel is warranted through their dept or if she should start with her PCP vs. Rheum referral. We reviewed the inflammatory effects of NSAIDs on the bowels.     She has a family history of ulcerative colitis in her mother and maternal grandmother. She has a history of alternating between constipation and diarrhea. No blood in the stool.    I recommend an EGD as nausea and vomiting has worsened. She has a nickel allergy so not able to do Bravo. Could consider high res esophageal manometry with pH in the future. We reviewed potential causes of nausea and vomiting being structural abnormality/gastric outlet obstruction, cannabis hyperemesis, celiac disease, H pylori, gastroparesis related to type 1 diabetes, constipation. Discussed importance of ruling out constipation prior to proceeding with NM GES. It may also be necessary to stop marijuana use for 6 months prior to gastric emptying study in the future.     We reviewed the following plan:   - Upper endoscopy ordered: you will get a call to schedule this   - Abdominal xray ordered: call [phone] to schedule   - You may start on a gastroparesis diet now: this is a low fat, low fiber diet  - Labs ordered for celiac disease: call [phone]  - I will collaborate with your pain provider regarding rheumatology workup  - Follow up in 2-3 months       - Adult GI  Referral - Consult Only  - XR Abdomen 1 View; Future  - Adult  GI  Referral - Procedure Only; Future  - Adult GI Clinic Follow-Up Order (Blank); Future  - IgA [LAB73]; Future  - Tissue transglutaminase chip IgA and IgG [[id number]]; Future      Video-Visit Details  Video Visit Time: 49 minutes  Type of service:  Video Visit  Originating Location (pt. Location): Home    Distant Location (provider location):  Off-site  Platform used for Video Visit: Juana Lawrence PA-C    RTC 3 months    Thank you for this consultation.  It was a pleasure to participate in the care of this patient; please contact us with any further questions. 78 minutes spent on the date of the encounter doing chart review, history and exam, documentation and further activities as noted above.    Taylor Lawrence PA-C  Division of Gastroenterology, Hepatology and Nutrition  [facility]      Again, thank you for allowing me to participate in the care of your patient.        Sincerely,        Taylor Lawrence PA-C

## 2024-09-18 ENCOUNTER — TELEPHONE (OUTPATIENT)
Dept: ENDOCRINOLOGY | Facility: CLINIC | Age: 34
End: 2024-09-18
Payer: MEDICARE

## 2024-09-18 NOTE — TELEPHONE ENCOUNTER
This message is to inform you that we are in need of Medicare compliant prescriptions for Dexcom G7.     Prescription must be written by: Ana Love.  Must include full supply name: Dexcom G7    Quantity: 1  Refills: 0  SIG: Use as directed per 's instructions    As a reminder, Medicare requires patients to be seen every 3 months for insulin supplies and every 6 months for CGMs. The provider who sees the patient must be the provider on the prescription, must be written on the day of or after office visit date and office visit must include notes about diabetes and insulin regimen. The Diabetes Care Services team at [facility] may request new prescriptions before renewal dates of the prescription is filled over the allowable amount. Writing the order to match what is above will help ensure we will only need to request every 3 or 6 months.    Thank you!    [facility]  Diabetes Care Services Team  [address] [place] [address]  Provider Phone: [phone]  Patient Line: [phone]  Fax: [phone]  E-mail: [email or web address]

## 2024-09-19 DIAGNOSIS — E10.9 TYPE 1 DIABETES MELLITUS WITHOUT COMPLICATION (H): ICD-10-CM

## 2024-09-19 RX ORDER — ACYCLOVIR 400 MG/1
TABLET ORAL
Qty: 1 EACH | Refills: 0 | Status: SHIPPED | OUTPATIENT
Start: 2024-09-19

## 2024-09-19 RX ORDER — ACYCLOVIR 400 MG/1
TABLET ORAL
Qty: 3 EACH | Refills: 5 | Status: SHIPPED | OUTPATIENT
Start: 2024-09-19

## 2024-09-23 ENCOUNTER — MYC MEDICAL ADVICE (OUTPATIENT)
Dept: INTERNAL MEDICINE | Facility: CLINIC | Age: 34
End: 2024-09-23

## 2024-09-23 ENCOUNTER — OFFICE VISIT (OUTPATIENT)
Dept: ANESTHESIOLOGY | Facility: CLINIC | Age: 34
End: 2024-09-23
Attending: ANESTHESIOLOGY
Payer: MEDICARE

## 2024-09-23 VITALS
RESPIRATION RATE: 16 BRPM | OXYGEN SATURATION: 100 % | HEART RATE: 99 BPM | SYSTOLIC BLOOD PRESSURE: 116 MMHG | DIASTOLIC BLOOD PRESSURE: 81 MMHG

## 2024-09-23 DIAGNOSIS — G89.29 CHRONIC PAIN OF MULTIPLE JOINTS: Primary | ICD-10-CM

## 2024-09-23 DIAGNOSIS — Z79.899 MEDICAL CANNABIS USE: ICD-10-CM

## 2024-09-23 DIAGNOSIS — M25.50 CHRONIC PAIN OF MULTIPLE JOINTS: Primary | ICD-10-CM

## 2024-09-23 DIAGNOSIS — M79.18 MYOFASCIAL PAIN: ICD-10-CM

## 2024-09-23 DIAGNOSIS — J30.2 SEASONAL ALLERGIC RHINITIS, UNSPECIFIED TRIGGER: Primary | ICD-10-CM

## 2024-09-23 PROCEDURE — 99214 OFFICE O/P EST MOD 30 MIN: CPT

## 2024-09-23 ASSESSMENT — PAIN SCALES - PAIN ENJOYMENT GENERAL ACTIVITY SCALE (PEG)
INTERFERED_ENJOYMENT_LIFE: 6
PEG_TOTALSCORE: 6.33
INTERFERED_GENERAL_ACTIVITY: 6
AVG_PAIN_PASTWEEK: 7

## 2024-09-23 ASSESSMENT — PAIN SCALES - GENERAL: PAINLEVEL: SEVERE PAIN (7)

## 2024-09-23 NOTE — LETTER
9/23/2024       RE: Rufino Farr  [address]  [facility] [address]     Dear Colleague,    Thank you for referring your patient, Rufino Farr, to the [facility] FOR COMPREHENSIVE PAIN MANAGEMENT [place] at [facility]. Please see a copy of my visit note below.    [facility]     Date of visit: 9/23/2024      Assessment:   Rufino Farr is a 34 year old female with a past medical history significant for migraine, DM 2, myofascial pain, multiple joint pain/hypermobile syndrome, who presents with complaints of chronic pain syndrome, medical cannabis evaluation.     Chronic pain syndrome - etiology likely multifactorial, DJD possible, MCTD not ruled out.       Assigned to [facility] nursing team.     Visit Diagnoses:  1. Chronic pain of multiple joints    2. Myofascial pain    3. Medical cannabis use        Plan:  Diagnosis reviewed, treatment option addressed, and risk/benifits discussed.  Self-care instructions given.  I am recommending a multidisciplinary treatment plan to help this patient better manage their pain.      [place] program[place]   Advised on lack of FDA approval/regulation of medical cannabis, products are not covered by insurance, and out of pocket cost dependent on product type/usage. From a harm reduction standpoint, the [place] medical marijuana [place] is a safer channel through which they may continue to explore the benefits of cannabis for chronic pain.   Follow up with Dr. Toussaint for ongoing pain management.   May schedule with Elly BUCIO CNP as needed for medical cannabis renewal.       Review of Electronic Chart: Today I have also reviewed available medical information in the patient's medical record at [facility] ([facility]) and Care Everywhere (if available), including relevant provider notes, laboratory work, and imaging.     Elly Yu, SOCORRO, APRN, AGNP-C  [facility]  [facility]     -------------------------------------------------    Subjective:    Chief complaint:   Chief Complaint   Patient presents with     RECHECK     Referral for medical cannabis         Interval history:  Rufino Farr is a 34 year old female last seen on 8/23/24.  They are a patient of Dr. Toussaint seen by me for the first time in follow up for medical cannabis certification only.     Recommendations/plan at the last visit included:  Physical Therapy: Pain PT ordered  Diagnostic Studies: None indicated at this time.   Medication Management:   Discussed only utilizing NSAIDS as needed to avoid renal injury in setting of her T1DM  Discussed if utilizing less NSAIDs, she could trial 4-8 mg of tizanidine in evenings given she does not use during the day  Referral to Elly Yu for medical cannabis evaluation   Procedures: Do not recommend injections for today's presentation of chronic myofascial pain   Pain Psychologist to address issues of relaxation, behavioral change, coping style, and other factors important to improvement: Referral placed  Other treatments: She has tried TENS units, chiro and acupuncture in the past; reasonable to trial again, PCP may order  Follow up: As needed    Since her last visit, Rufino Farr reports:  -She presents today for medical cannabis evaluation.   -Dr. Toussaint referred her to me for this eval.   -She currently uses OTC products for pain and other conditions.   -She reports notably reduction with intensity.   -Denies hx of severe cardiac or hepatic conditions.   -Denies family or personal hx of schizophrenia/schizoaffective disorder.     -She moved from [facility] to [place] a few months ago.   -She is from [place] originally.   -SO is present for the visit.     PEG: A Three-Item Scale Assessing Pain Intensity and Interference    What number best describes your PAIN ON AVERAGE in the past week? 7    What number best describes how, during the past week, pain has  interfered with your ENJOYMENT OF LIFE? 6    What number best describes how, during the past week, pain has interfered with your GENERAL ACTIVITY? 6    PEG Total Score: 6.33    Meghann EE, Mukesh KA, Alejandro MJ, Fide TA, Trung J, Nat MATTSON, Amie SM, Ra K. Development and initial validation of the PEG, a 3-item scale assessing pain intensity and interference. Journal of General Internal Medicine. 2009 [place];24:733-738.        HPI from initial visit with Dr. Toussaint on 8/23/24:  Rufino Farr is a 33 year old female with PMH of IDDM, anxiety, depression, presenting with a chief pain complaint of chronic muscle pain.      Onset: Pain started at 23 after developing multiple health issues including thyroid, T1DM and PCOS with a hysterectomy. Since then has begun to become more progressive and has more autoimmune issues - she's seeing cardiology to look into POTS. She's had TOS   Location and Radiation: Neck and paraspinals, traps, bilateral shoulders and chest wall, low back at the betline with radiation to bilateral hips, bilateral knees, elbows and hands.   Provoking: Sitting or standing too long, too much or too little activity   Palliating: Rest and stretch   Quality: Deep ache with occasional shooting, stabbing, tension, pressure  Severity: Best at 3-4/10, worst 8-9/10, lives at 6-7/10  Timing: Morning more stiff, throbbing occurs later in the day   Numbness: Bilateral 4/5 digits which is constant; bilateral tips of toes which occurs with periods of increased pain; she's had multiple EMGs in [place] without etiology   Weakness: Bilateral hands feel weak;      Sleep is poor. Has intermittent nausea/emesis. Will have back and forth symptoms of diarrhea and constipation. She reports hypermobility in her knees and hips.      Patient denies red flag symptoms of corresponding bowel/bladder symptoms, fever/chills, saddle anesthesia, profound motor loss, weight loss, or sudden unremitting increase in pain.        Current  Pain Treatments:    Tizanidine - 4 mg TID prn - helps but sedating    Indomethacin 100mg BID - not very helpful   Ibuprofen - only uses when not taking indomethacin   Amitriptyline 100 mg at bedtime -  for mood and anxiety, doesn't help with sleep or pain      [place] program.     Pain PT - Jon FORD    Current MME: N/A    Review of [place] Prescription Monitoring Program (): No concern for abuse or misuse of controlled medications based on this report. Reviewed - [place] rx [place] repor in the last 12 months.     Past pain treatments:  Gabapentin - did not tolerate  Lyrica - did not tolerate  Methocarbamol - no benefit  Flexeril - no benefit  Cymbalta - brain zaps      Other treatments have included:  Rufino Farr has been seen at a pain clinic in the past. Was seen in [place], moved here recently.   PT: Years ago, somewhat helpful, worked on neck and shoulders    OT: Years ago, worked on posture and positioning   Injections:               - Cervical RFA - no relief, made it worse              - Trigger points - helpful but very short term   Surgery:               - Bilateral ulnar decompression - limited benefit               - Bilateral carpal tunnel release - limited benefit   Alternative:               - Chiro - no lasting relief              - Biofeedback               - Densensitization therapy               - TENS unit - not helpful     Medications:  Current Outpatient Medications   Medication Sig Dispense Refill     acetaminophen (TYLENOL) 325 MG tablet Take 2 tablets (650 mg) by mouth every 4 hours as needed for mild pain or fever       amitriptyline (ELAVIL) 100 MG tablet Take 1 tablet (100 mg) by mouth at bedtime 30 tablet 3     blood glucose (NO BRAND SPECIFIED) test strip Use to test blood sugar 4 times daily while off sensor (Patient not taking: Reported on 9/9/2024) 100 strip 11     blood glucose monitoring (NO BRAND SPECIFIED) meter device kit Use to test blood sugar  "4 times daily or as directed. (Patient not taking: Reported on 9/9/2024) 1 kit 0     calcium carbonate (TUMS) 500 MG chewable tablet Take 1 tablet (500 mg) by mouth 4 times daily as needed for heartburn       cetirizine (ZYRTEC) 10 MG tablet Take 10 mg by mouth every evening       Continuous Glucose  (DEXCOM G7 ) ASCENCION Use to read blood sugars as per 's instructions. 1 each 0     Continuous Glucose Sensor (DEXCOM G7 SENSOR) MISC Change every 10 days. 3 each 5     estradiol (ESTRACE) 1 MG tablet Take 1 tablet (1 mg) by mouth every evening 30 tablet 3     HUMALOG 100 UNIT/ML injection Inject 1 Units subcutaneously 4 times daily (with meals and nightly) MAX DAILY DOSE OF 65 UNITS VIA INSULIN PUMP 10 mL 3     ibuprofen (ADVIL/MOTRIN) 800 MG tablet Take 1 tablet (800 mg) by mouth every 6 hours as needed for inflammatory pain       indomethacin (INDOCIN) 50 MG capsule Take 100 mg by mouth 2 times daily as needed for moderate pain       Insulin Infusion Pump Supplies (AUTOSOFT 90 INFUSION SET) MISC 1 each every 3 days. 23\" 6 mm 10 each 11     Insulin Infusion Pump Supplies (T:SLIM X2 3ML CARTRIDGE) MISC 1 each every 3 days. 10 each 11     levothyroxine (SYNTHROID/LEVOTHROID) 137 MCG tablet Take 1 tablet (137 mcg) by mouth daily. 90 tablet 3     metoprolol succinate ER (TOPROL XL) 25 MG 24 hr tablet Take 1 tablet (25 mg) by mouth daily. (Patient not taking: Reported on 9/9/2024) 30 tablet 3     metroNIDAZOLE (METROGEL) 0.75 % vaginal gel Place vaginally twice a week As needed       ondansetron (ZOFRAN ODT) 4 MG ODT tab Take 1 tablet (4 mg) by mouth every 8 hours as needed for nausea 14 tablet 0     tiZANidine (ZANAFLEX) 4 MG tablet Take 1 tablet (4 mg) by mouth 3 times daily 90 tablet 3       Medical History: any changes in medical history since they were last seen? No     ROS: 10 point ROS neg other than the symptoms noted above in the HPI or patient questionnaire.     Objective:    Physical " Exam:  Blood pressure 116/81, pulse 99, resp. rate 16, SpO2 100%.  Constitutional: Well developed, well nourished, appears stated age.  Gait: Intact   HEENT: Head atraumatic, normocephalic. Eyes without conjunctival injection or jaundice. Oropharynx clear. Neck supple. No obvious neck masses.  Skin: No rash, lesions, or petechiae of exposed skin.   Psychiatric/mental status: Alert, without lethargy or stupor. Speech fluent. Appropriate affect. Mood normal. Able to follow commands without difficulty.     Diagnostic Tests/Imaging/Labs:  CMP 7/2024 reviewed, hepatic and renal function WNL    BILLING TIME DOCUMENTATION:   The total TIME spent on this patient on the date of the encounter/appointment was 36 minutes.      TOTAL TIME includes:   Time spent preparing to see the patient (reviewing records and tests)   Time spent face to face (or over the phone) with the patient   Time spent ordering tests, medications, procedures and referrals   Time spent Referring and communicating with other healthcare professionals   Time spent documenting clinical information in Epic             Again, thank you for allowing me to participate in the care of your patient.      Sincerely,    FORTUNATO Ledbetter CNP

## 2024-09-23 NOTE — NURSING NOTE
Patient presents with:  RECHECK: Referral for medical cannabis        Severe Pain (7)     Pain Medications       Analgesics Other Refills Start End     acetaminophen (TYLENOL) 325 MG tablet -- 5/27/2024 --    Sig - Route: Take 2 tablets (650 mg) by mouth every 4 hours as needed for mild pain or fever - Oral    Class: No Print Out            What medications are you using for pain? Indomethycin, IBU, tylenol, tizandine      What refills are you needing today? none    Expectations: medical cannabis, anti-inflammatory meds, questions concerning low back    Maricarmen Molina LPN

## 2024-09-23 NOTE — PATIENT INSTRUCTIONS
Treatment planning:    Certified for Medical Cannabis today.    Discuss referral placed by PCP for Dr. Jules Wagner in regard to hypermobile syndrome      Recommended Follow up:      Follow up with Dr. Toussaint       To speak with a nurse, schedule/reschedule/cancel a clinic appointment, or request a medication refill call: [phone].    You can also reach us by [employer]: [email or web address]                                      Hydroxyzine Pregnancy And Lactation Text: This medication is not safe during pregnancy and should not be taken. It is also excreted in breast milk and breast feeding isn't recommended.

## 2024-09-23 NOTE — PROGRESS NOTES
[facility]     Date of visit: 9/23/2024      Assessment:   Rufino Farr is a 34 year old female with a past medical history significant for migraine, DM 2, myofascial pain, multiple joint pain/hypermobile syndrome, who presents with complaints of chronic pain syndrome, medical cannabis evaluation.     Chronic pain syndrome - etiology likely multifactorial, DJD possible, MCTD not ruled out.       [place] to [facility] [place].     Visit Diagnoses:  1. Chronic pain of multiple joints    2. Myofascial pain    3. Medical cannabis use        Plan:  Diagnosis reviewed, treatment option addressed, and risk/benifits discussed.  Self-care instructions given.  I am recommending a multidisciplinary treatment plan to help this patient better manage their pain.      [place]   Advised on lack of FDA approval/regulation of medical cannabis, products are not covered by insurance, and out of pocket cost dependent on product type/usage. From a harm reduction standpoint, the [place] is a safer channel through which they may continue to explore the benefits of cannabis for chronic pain.   Follow up with Dr. Toussaint for ongoing pain management.   May schedule with Elly BUCIO CNP as needed for medical cannabis renewal.       Review of Electronic Chart: Today I have also reviewed available medical information in the patient's medical record at [facility] ([facility]) and Care Everywhere (if available), including relevant provider notes, laboratory work, and imaging.     Elly Yu, SOCORRO, APRN, AGNP-C  [facility]     -------------------------------------------------    Subjective:    Chief complaint:   Chief Complaint   Patient presents with    RECHECK     Referral for medical cannabis         Interval history:  Rufino Farr is a 34 year old female last seen on 8/23/24.  They are a patient of Dr. Toussaint seen by me for the  first time in follow up for medical cannabis certification only.     Recommendations/plan at the last visit included:  Physical Therapy: Pain PT ordered  Diagnostic Studies: None indicated at this time.   Medication Management:   Discussed only utilizing NSAIDS as needed to avoid renal injury in setting of her T1DM  Discussed if utilizing less NSAIDs, she could trial 4-8 mg of tizanidine in evenings given she does not use during the day  Referral to Elly Yu for medical cannabis evaluation   Procedures: Do not recommend injections for today's presentation of chronic myofascial pain   Pain Psychologist to address issues of relaxation, behavioral change, coping style, and other factors important to improvement: Referral placed  Other treatments: She has tried TENS units, chiro and acupuncture in the past; reasonable to trial again, PCP may order  Follow up: As needed    Since her last visit, Rufino Farr reports:  -She presents today for medical cannabis evaluation.   -Dr. Tousasint referred her to me for this eval.   -She currently uses OTC products for pain and other conditions.   -She reports notably reduction with intensity.   -Denies hx of severe cardiac or hepatic conditions.   -Denies family or personal hx of schizophrenia/schizoaffective disorder.     -She moved from [facility] to [place] a few months ago.   -She is from [place] originally.   -SO is present for the visit.     PEG: A Three-Item Scale Assessing Pain Intensity and Interference    What number best describes your PAIN ON AVERAGE in the past week? 7    What number best describes how, during the past week, pain has interfered with your ENJOYMENT OF LIFE? 6    What number best describes how, during the past week, pain has interfered with your GENERAL ACTIVITY? 6    PEG Total Score: 6.33    Meghann SORENSEN, Mukesh KA, Alejandro FINE, Fide TA, Trung J, Nat MATTSON, Amie SM, Ra K. Development and initial validation of the PEG, a 3-item scale assessing pain intensity and  interference. Journal of General Internal Medicine. 2009 [place];24:733-738.        HPI from initial visit with Dr. Toussaint on 8/23/24:  Rufino Farr is a 33 year old female with PMH of IDDM, anxiety, depression, presenting with a chief pain complaint of chronic muscle pain.      Onset: Pain started at 23 after developing multiple health issues including thyroid, T1DM and PCOS with a hysterectomy. Since then has begun to become more progressive and has more autoimmune issues - she's seeing cardiology to look into POTS. She's had TOS   Location and Radiation: Neck and paraspinals, traps, bilateral shoulders and chest wall, low back at the betline with radiation to bilateral hips, bilateral knees, elbows and hands.   Provoking: Sitting or standing too long, too much or too little activity   Palliating: Rest and stretch   Quality: Deep ache with occasional shooting, stabbing, tension, pressure  Severity: Best at 3-4/10, worst 8-9/10, lives at 6-7/10  Timing: Morning more stiff, throbbing occurs later in the day   Numbness: Bilateral 4/5 digits which is constant; bilateral tips of toes which occurs with periods of increased pain; she's had multiple EMGs in [place] without etiology   Weakness: Bilateral hands feel weak;      Sleep is poor. Has intermittent nausea/emesis. Will have back and forth symptoms of diarrhea and constipation. She reports hypermobility in her knees and hips.      Patient denies red flag symptoms of corresponding bowel/bladder symptoms, fever/chills, saddle anesthesia, profound motor loss, weight loss, or sudden unremitting increase in pain.        Current Pain Treatments:    Tizanidine - 4 mg TID prn - helps but sedating    Indomethacin 100mg BID - not very helpful   Ibuprofen - only uses when not taking indomethacin   Amitriptyline 100 mg at bedtime -  for mood and anxiety, doesn't help with sleep or pain      [place] cannabis - [place] program.     Pain PT - Jon FORD    Current MME:  N/A    Review of [place] Prescription Monitoring Program (): No concern for abuse or misuse of controlled medications based on this report. Reviewed - [place] rx [place] repor in the last 12 months.     Past pain treatments:  Gabapentin - did not tolerate  Lyrica - did not tolerate  Methocarbamol - no benefit  Flexeril - no benefit  Cymbalta - brain zaps      Other treatments have included:  Rufino Farr has been seen at a pain clinic in the past. Was seen in [place], moved here recently.   PT: Years ago, somewhat helpful, worked on neck and shoulders    OT: Years ago, worked on posture and positioning   Injections:               - Cervical RFA - no relief, made it worse              - Trigger points - helpful but very short term   Surgery:               - Bilateral ulnar decompression - limited benefit               - Bilateral carpal tunnel release - limited benefit   Alternative:               - Chiro - no lasting relief              - Biofeedback               - Densensitization therapy               - TENS unit - not helpful     Medications:  Current Outpatient Medications   Medication Sig Dispense Refill    acetaminophen (TYLENOL) 325 MG tablet Take 2 tablets (650 mg) by mouth every 4 hours as needed for mild pain or fever      amitriptyline (ELAVIL) 100 MG tablet Take 1 tablet (100 mg) by mouth at bedtime 30 tablet 3    blood glucose (NO BRAND SPECIFIED) test strip Use to test blood sugar 4 times daily while off sensor (Patient not taking: Reported on 9/9/2024) 100 strip 11    blood glucose monitoring (NO BRAND SPECIFIED) meter device kit Use to test blood sugar 4 times daily or as directed. (Patient not taking: Reported on 9/9/2024) 1 kit 0    calcium carbonate (TUMS) 500 MG chewable tablet Take 1 tablet (500 mg) by mouth 4 times daily as needed for heartburn      cetirizine (ZYRTEC) 10 MG tablet Take 10 mg by mouth every evening      Continuous Glucose  (DEXCOM G7 ) ASCENCION Use to read blood  "sugars as per 's instructions. 1 each 0    Continuous Glucose Sensor (DEXCOM G7 SENSOR) MISC Change every 10 days. 3 each 5    estradiol (ESTRACE) 1 MG tablet Take 1 tablet (1 mg) by mouth every evening 30 tablet 3    HUMALOG 100 UNIT/ML injection Inject 1 Units subcutaneously 4 times daily (with meals and nightly) MAX DAILY DOSE OF 65 UNITS VIA INSULIN PUMP 10 mL 3    ibuprofen (ADVIL/MOTRIN) 800 MG tablet Take 1 tablet (800 mg) by mouth every 6 hours as needed for inflammatory pain      indomethacin (INDOCIN) 50 MG capsule Take 100 mg by mouth 2 times daily as needed for moderate pain      Insulin Infusion Pump Supplies (AUTOSOFT 90 INFUSION SET) MISC 1 each every 3 days. 23\" 6 mm 10 each 11    Insulin Infusion Pump Supplies (T:SLIM X2 3ML CARTRIDGE) MISC 1 each every 3 days. 10 each 11    levothyroxine (SYNTHROID/LEVOTHROID) 137 MCG tablet Take 1 tablet (137 mcg) by mouth daily. 90 tablet 3    metoprolol succinate ER (TOPROL XL) 25 MG 24 hr tablet Take 1 tablet (25 mg) by mouth daily. (Patient not taking: Reported on 9/9/2024) 30 tablet 3    metroNIDAZOLE (METROGEL) 0.75 % vaginal gel Place vaginally twice a week As needed      ondansetron (ZOFRAN ODT) 4 MG ODT tab Take 1 tablet (4 mg) by mouth every 8 hours as needed for nausea 14 tablet 0    tiZANidine (ZANAFLEX) 4 MG tablet Take 1 tablet (4 mg) by mouth 3 times daily 90 tablet 3       Medical History: any changes in medical history since they were last seen? No     ROS: 10 point ROS neg other than the symptoms noted above in the HPI or patient questionnaire.     Objective:    Physical Exam:  Blood pressure 116/81, pulse 99, resp. rate 16, SpO2 100%.  Constitutional: Well developed, well nourished, appears stated age.  Gait: Intact   HEENT: Head atraumatic, normocephalic. Eyes without conjunctival injection or jaundice. Oropharynx clear. Neck supple. No obvious neck masses.  Skin: No rash, lesions, or petechiae of exposed skin.   Psychiatric/mental " status: Alert, without lethargy or stupor. Speech fluent. Appropriate affect. Mood normal. Able to follow commands without difficulty.     Diagnostic Tests/Imaging/Labs:  CMP 7/2024 reviewed, hepatic and renal function WNL    BILLING TIME DOCUMENTATION:   The total TIME spent on this patient on the date of the encounter/appointment was 36 minutes.      TOTAL TIME includes:   Time spent preparing to see the patient (reviewing records and tests)   Time spent face to face (or over the phone) with the patient   Time spent ordering tests, medications, procedures and referrals   Time spent Referring and communicating with other healthcare professionals   Time spent documenting clinical information in Epic

## 2024-09-25 ENCOUNTER — LAB (OUTPATIENT)
Dept: LAB | Facility: CLINIC | Age: 34
End: 2024-09-25
Payer: MEDICARE

## 2024-09-25 DIAGNOSIS — E10.9 TYPE 1 DIABETES MELLITUS WITHOUT COMPLICATION (H): ICD-10-CM

## 2024-09-25 DIAGNOSIS — R11.2 NAUSEA AND VOMITING, UNSPECIFIED VOMITING TYPE: ICD-10-CM

## 2024-09-25 DIAGNOSIS — R14.0 ABDOMINAL BLOATING: ICD-10-CM

## 2024-09-25 LAB
ACTH PLAS-MCNC: 20 PG/ML
CHOLEST SERPL-MCNC: 189 MG/DL
CORTIS SERPL-MCNC: 25 UG/DL
CREAT UR-MCNC: 21.5 MG/DL
FASTING STATUS PATIENT QL REPORTED: YES
HDLC SERPL-MCNC: 49 MG/DL
IGA SERPL-MCNC: 186 MG/DL (ref 84–499)
LDLC SERPL CALC-MCNC: 119 MG/DL
MICROALBUMIN UR-MCNC: <12 MG/L
MICROALBUMIN/CREAT UR: NORMAL MG/G{CREAT}
NONHDLC SERPL-MCNC: 140 MG/DL
TRIGL SERPL-MCNC: 107 MG/DL
TTG IGA SER-ACNC: <0.2 U/ML
TTG IGG SER-ACNC: <0.6 U/ML

## 2024-09-25 PROCEDURE — 82784 ASSAY IGA/IGD/IGG/IGM EACH: CPT | Performed by: STUDENT IN AN ORGANIZED HEALTH CARE EDUCATION/TRAINING PROGRAM

## 2024-09-25 PROCEDURE — 82533 TOTAL CORTISOL: CPT | Performed by: STUDENT IN AN ORGANIZED HEALTH CARE EDUCATION/TRAINING PROGRAM

## 2024-09-25 PROCEDURE — 82043 UR ALBUMIN QUANTITATIVE: CPT | Performed by: STUDENT IN AN ORGANIZED HEALTH CARE EDUCATION/TRAINING PROGRAM

## 2024-09-25 PROCEDURE — 36415 COLL VENOUS BLD VENIPUNCTURE: CPT | Performed by: PATHOLOGY

## 2024-09-25 PROCEDURE — 82024 ASSAY OF ACTH: CPT | Performed by: STUDENT IN AN ORGANIZED HEALTH CARE EDUCATION/TRAINING PROGRAM

## 2024-09-25 PROCEDURE — 80061 LIPID PANEL: CPT | Performed by: PATHOLOGY

## 2024-09-25 PROCEDURE — 99000 SPECIMEN HANDLING OFFICE-LAB: CPT | Performed by: PATHOLOGY

## 2024-09-25 PROCEDURE — 86364 TISS TRNSGLTMNASE EA IG CLAS: CPT | Mod: 59 | Performed by: STUDENT IN AN ORGANIZED HEALTH CARE EDUCATION/TRAINING PROGRAM

## 2024-09-25 RX ORDER — CETIRIZINE HYDROCHLORIDE 10 MG/1
10 TABLET ORAL EVERY EVENING
Qty: 30 TABLET | Refills: 3 | Status: SHIPPED | OUTPATIENT
Start: 2024-09-25

## 2024-09-27 ENCOUNTER — TELEPHONE (OUTPATIENT)
Dept: GASTROENTEROLOGY | Facility: CLINIC | Age: 34
End: 2024-09-27

## 2024-09-27 ENCOUNTER — OFFICE VISIT (OUTPATIENT)
Dept: ANESTHESIOLOGY | Facility: CLINIC | Age: 34
End: 2024-09-27
Payer: MEDICARE

## 2024-09-27 VITALS
SYSTOLIC BLOOD PRESSURE: 113 MMHG | HEART RATE: 102 BPM | RESPIRATION RATE: 16 BRPM | OXYGEN SATURATION: 100 % | DIASTOLIC BLOOD PRESSURE: 77 MMHG

## 2024-09-27 DIAGNOSIS — M25.50 CHRONIC PAIN OF MULTIPLE JOINTS: Primary | ICD-10-CM

## 2024-09-27 DIAGNOSIS — M79.18 MYOFASCIAL PAIN: ICD-10-CM

## 2024-09-27 DIAGNOSIS — G89.29 CHRONIC PAIN OF MULTIPLE JOINTS: Primary | ICD-10-CM

## 2024-09-27 PROCEDURE — 99213 OFFICE O/P EST LOW 20 MIN: CPT | Performed by: ANESTHESIOLOGY

## 2024-09-27 ASSESSMENT — PAIN SCALES - PAIN ENJOYMENT GENERAL ACTIVITY SCALE (PEG)
PEG_TOTALSCORE: 6.33
INTERFERED_GENERAL_ACTIVITY: 6
AVG_PAIN_PASTWEEK: 7
INTERFERED_ENJOYMENT_LIFE: 6

## 2024-09-27 ASSESSMENT — PAIN SCALES - GENERAL: PAINLEVEL: SEVERE PAIN (7)

## 2024-09-27 NOTE — LETTER
9/27/2024       RE: Rufino Farr  [address]  [facility] [address]     Dear Colleague,    Thank you for referring your patient, Rufino Farr, to the [facility] FOR COMPREHENSIVE PAIN MANAGEMENT [place] at [facility]. Please see a copy of my visit note below.    [facility]    Date of visit: 9/27/2024    Chief complaint:   Chief Complaint   Patient presents with     Pain     Pain Management     Follow up after seeing NP       Interval history:  Rufino Farr was last seen by me on 8/23/2024.      Recommendations/plan at the last visit included:  Physical Therapy: Pain PT ordered  Diagnostic Studies: None indicated at this time.   Medication Management:   Discussed only utilizing NSAIDS as needed to avoid renal injury in setting of her T1DM  Discussed if utilizing less NSAIDs, she could trial 4-8 mg of tizanidine in evenings given she does not use during the day  Referral to Elly Yu for medical cannabis evaluation   Procedures: Do not recommend injections for today's presentation of chronic myofascial pain   Pain Psychologist to address issues of relaxation, behavioral change, coping style, and other factors important to improvement: Referral placed  Other treatments: She has tried TENS units, chiro and acupuncture in the past; reasonable to trial again, PCP may order  Follow up: As needed    Since her last visit, Rufino Farr reports:  Just started pain PT and is not very optimistic because feels like she is doing many of the maneuvers and mindfulness techniques. Will continue to follow up. Has done significant amount of physical therapy in [place] prior to this.   Frustrated that there is no significant pain relief    Met with Elly Yu on Monday about medical cannabis    Pain scores:  Pain intensity on average is 7 on a scale of 0-10.     Current pain medications include:  Tizanidine - 4 mg TID prn - helps but  sedating    Indomethacin 100mg BID - not very helpful   Ibuprofen - only uses when not taking indomethacin   Amitriptyline 100 mg at bedtime -  for mood and anxiety, doesn't help with sleep or pain       Previous medication treatments included:  Gabapentin - did not tolerate  Lyrica - did not tolerate  Methocarbamol - no benefit  Flexeril - no benefit  Cymbalta - brain zaps    Side Effects: denies any problems    Medications:  Current Outpatient Medications   Medication Sig Dispense Refill     acetaminophen (TYLENOL) 325 MG tablet Take 2 tablets (650 mg) by mouth every 4 hours as needed for mild pain or fever       amitriptyline (ELAVIL) 100 MG tablet Take 1 tablet (100 mg) by mouth at bedtime 30 tablet 3     blood glucose (NO BRAND SPECIFIED) test strip Use to test blood sugar 4 times daily while off sensor 100 strip 11     blood glucose monitoring (NO BRAND SPECIFIED) meter device kit Use to test blood sugar 4 times daily or as directed. 1 kit 0     calcium carbonate (TUMS) 500 MG chewable tablet Take 1 tablet (500 mg) by mouth 4 times daily as needed for heartburn       cetirizine (ZYRTEC) 10 MG tablet Take 1 tablet (10 mg) by mouth every evening. 30 tablet 3     Continuous Glucose  (DEXCOM G7 ) ASCENCION Use to read blood sugars as per 's instructions. 1 each 0     Continuous Glucose Sensor (DEXCOM G7 SENSOR) MISC Change every 10 days. 3 each 5     estradiol (ESTRACE) 1 MG tablet Take 1 tablet (1 mg) by mouth every evening 30 tablet 3     HUMALOG 100 UNIT/ML injection Inject 1 Units subcutaneously 4 times daily (with meals and nightly) MAX DAILY DOSE OF 65 UNITS VIA INSULIN PUMP 10 mL 3     ibuprofen (ADVIL/MOTRIN) 800 MG tablet Take 1 tablet (800 mg) by mouth every 6 hours as needed for inflammatory pain       indomethacin (INDOCIN) 50 MG capsule Take 100 mg by mouth 2 times daily as needed for moderate pain       Insulin Infusion Pump Supplies (AUTOSOFT 90 INFUSION SET) MISC 1 each every  "3 days. 23\" 6 mm 10 each 11     Insulin Infusion Pump Supplies (T:SLIM X2 3ML CARTRIDGE) MISC 1 each every 3 days. 10 each 11     levothyroxine (SYNTHROID/LEVOTHROID) 137 MCG tablet Take 1 tablet (137 mcg) by mouth daily. 90 tablet 3     metoprolol succinate ER (TOPROL XL) 25 MG 24 hr tablet Take 1 tablet (25 mg) by mouth daily. 30 tablet 3     metroNIDAZOLE (METROGEL) 0.75 % vaginal gel Place vaginally twice a week As needed       ondansetron (ZOFRAN ODT) 4 MG ODT tab Take 1 tablet (4 mg) by mouth every 8 hours as needed for nausea 14 tablet 0     tiZANidine (ZANAFLEX) 4 MG tablet Take 1 tablet (4 mg) by mouth 3 times daily 90 tablet 3       Medical History: any changes in medical history since they were last seen? No    Review of Systems:  The 14 system ROS was reviewed from the intake questionnaire, and is positive for: back pain, muscle pain  Any bowel or bladder problems: denies  Mood: stable, tearful    Physical Exam:  Blood pressure 113/77, pulse 102, resp. rate 16, SpO2 100%.  General: AAOx3, NAD  Gait: Normal  MSK exam: deferred for conversation    Assessment:   Chronic Myofascial Pain  Multi joint pain    Rufino Farr is a 34 year old female who is seen at the pain clinic for follow up to discuss current management of her pain. She is tearful at today's visit as she feels that there is no change in her level of pain.  I discussed with her that my recommendation is therapy based approach. She has been experiencing this pain for many years, and was previously managed at a pain clinic in [place].     Plan:  Physical Therapy:  continue current regimen  Clinical Health Psychologist to address issues of relaxation, behavioral change, coping style, and other factors important to improvement.  Continue as interested  Diagnostic Studies:  none  Medication Management:  no changes  Further procedures recommended: none  Recommendations to PCP: patient was referred to rheumatology  Follow up: 2 - 3 months as " needed    Trice Toussaint MD    Pain Medicine  Department of Anesthesiology  [facility]              Again, thank you for allowing me to participate in the care of your patient.      Sincerely,    Trice Toussaint MD

## 2024-09-27 NOTE — PATIENT INSTRUCTIONS
Referrals:    Referral for Rheumatology      Recommended Follow up:      Follow up in 2-3 months       To speak with a nurse, schedule/reschedule/cancel a clinic appointment, or request a medication refill call: [phone].    You can also reach us by [email or web address]: [email or web address]

## 2024-09-27 NOTE — NURSING NOTE
Patient presents with:  Pain  Pain Management: Follow up after seeing NP      Severe Pain (7)     Pain Medications       Analgesics Other Refills Start End     acetaminophen (TYLENOL) 325 MG tablet -- 5/27/2024 --    Sig - Route: Take 2 tablets (650 mg) by mouth every 4 hours as needed for mild pain or fever - Oral    Class: No Print Out            What medications are you using for pain? Indomethacin, muscle relaxer, ibuprofen    Have you been seen by another pain clinic/ provider? Elly Yu      Expectations: addressing concerns

## 2024-09-27 NOTE — PROGRESS NOTES
[facility]    Date of visit: 9/27/2024    Chief complaint:   Chief Complaint   Patient presents with    Pain    Pain Management     Follow up after seeing NP       Interval history:  Rufino Farr was last seen by me on 8/23/2024.      Recommendations/plan at the last visit included:  Physical Therapy: Pain PT ordered  Diagnostic Studies: None indicated at this time.   Medication Management:   Discussed only utilizing NSAIDS as needed to avoid renal injury in setting of her T1DM  Discussed if utilizing less NSAIDs, she could trial 4-8 mg of tizanidine in evenings given she does not use during the day  Referral to Elly Yu for medical cannabis evaluation   Procedures: Do not recommend injections for today's presentation of chronic myofascial pain   Pain Psychologist to address issues of relaxation, behavioral change, coping style, and other factors important to improvement: Referral placed  Other treatments: She has tried TENS units, chiro and acupuncture in the past; reasonable to trial again, PCP may order  Follow up: As needed    Since her last visit, Rufino Farr reports:  Just started pain PT and is not very optimistic because feels like she is doing many of the maneuvers and mindfulness techniques. Will continue to follow up. Has done significant amount of physical therapy in [place] prior to this.   Frustrated that there is no significant pain relief    Met with Elly Yu on Monday about medical cannabis    Pain scores:  Pain intensity on average is 7 on a scale of 0-10.     Current pain medications include:  Tizanidine - 4 mg TID prn - helps but sedating    Indomethacin 100mg BID - not very helpful   Ibuprofen - only uses when not taking indomethacin   Amitriptyline 100 mg at bedtime -  for mood and anxiety, doesn't help with sleep or pain       Previous medication treatments included:  Gabapentin - did not tolerate  Lyrica - did not tolerate  Methocarbamol - no benefit  Flexeril - no  "benefit  Cymbalta - brain zaps    Side Effects: denies any problems    Medications:  Current Outpatient Medications   Medication Sig Dispense Refill    acetaminophen (TYLENOL) 325 MG tablet Take 2 tablets (650 mg) by mouth every 4 hours as needed for mild pain or fever      amitriptyline (ELAVIL) 100 MG tablet Take 1 tablet (100 mg) by mouth at bedtime 30 tablet 3    blood glucose (NO BRAND SPECIFIED) test strip Use to test blood sugar 4 times daily while off sensor 100 strip 11    blood glucose monitoring (NO BRAND SPECIFIED) meter device kit Use to test blood sugar 4 times daily or as directed. 1 kit 0    calcium carbonate (TUMS) 500 MG chewable tablet Take 1 tablet (500 mg) by mouth 4 times daily as needed for heartburn      cetirizine (ZYRTEC) 10 MG tablet Take 1 tablet (10 mg) by mouth every evening. 30 tablet 3    Continuous Glucose  (DEXCOM G7 ) ASCENCION Use to read blood sugars as per 's instructions. 1 each 0    Continuous Glucose Sensor (DEXCOM G7 SENSOR) MISC Change every 10 days. 3 each 5    estradiol (ESTRACE) 1 MG tablet Take 1 tablet (1 mg) by mouth every evening 30 tablet 3    HUMALOG 100 UNIT/ML injection Inject 1 Units subcutaneously 4 times daily (with meals and nightly) MAX DAILY DOSE OF 65 UNITS VIA INSULIN PUMP 10 mL 3    ibuprofen (ADVIL/MOTRIN) 800 MG tablet Take 1 tablet (800 mg) by mouth every 6 hours as needed for inflammatory pain      indomethacin (INDOCIN) 50 MG capsule Take 100 mg by mouth 2 times daily as needed for moderate pain      Insulin Infusion Pump Supplies (AUTOSOFT 90 INFUSION SET) MISC 1 each every 3 days. 23\" 6 mm 10 each 11    Insulin Infusion Pump Supplies (T:SLIM X2 3ML CARTRIDGE) MISC 1 each every 3 days. 10 each 11    levothyroxine (SYNTHROID/LEVOTHROID) 137 MCG tablet Take 1 tablet (137 mcg) by mouth daily. 90 tablet 3    metoprolol succinate ER (TOPROL XL) 25 MG 24 hr tablet Take 1 tablet (25 mg) by mouth daily. 30 tablet 3    metroNIDAZOLE " (METROGEL) 0.75 % vaginal gel Place vaginally twice a week As needed      ondansetron (ZOFRAN ODT) 4 MG ODT tab Take 1 tablet (4 mg) by mouth every 8 hours as needed for nausea 14 tablet 0    tiZANidine (ZANAFLEX) 4 MG tablet Take 1 tablet (4 mg) by mouth 3 times daily 90 tablet 3       Medical History: any changes in medical history since they were last seen? No    Review of Systems:  The 14 system ROS was reviewed from the intake questionnaire, and is positive for: back pain, muscle pain  Any bowel or bladder problems: denies  Mood: stable, tearful    Physical Exam:  Blood pressure 113/77, pulse 102, resp. rate 16, SpO2 100%.  General: AAOx3, NAD  Gait: Normal  MSK exam: deferred for conversation    Assessment:   Chronic Myofascial Pain  Multi joint pain    Rufino Farr is a 34 year old female who is seen at the pain clinic for follow up to discuss current management of her pain. She is tearful at today's visit as she feels that there is no change in her level of pain.  I discussed with her that my recommendation is therapy based approach. She has been experiencing this pain for many years, and was previously managed at a pain clinic in [place].     Plan:  Physical Therapy:  continue current regimen  Clinical Health Psychologist to address issues of relaxation, behavioral change, coping style, and other factors important to improvement.  Continue as interested  Diagnostic Studies:  none  Medication Management:  no changes  Further procedures recommended: none  Recommendations to PCP: patient was referred to rheumatology  Follow up: 2 - 3 months as needed    Trice Toussaint MD    Pain Medicine  Department of Anesthesiology  [facility]

## 2024-09-27 NOTE — TELEPHONE ENCOUNTER
Left Voicemail (1st Attempt) and Sent Mychart (1st Attempt) for the patient to call back and schedule the following:    Appointment type: Return GI  Provider: Taylor Lawrence   Return date: 3 mo (around Dec 2024)- after tests/procedures are completed  Specialty phone number: [phone]  Additional appointment(s) needed:   -procedure  -XR Abdomen 1 View   Additonal Notes: LVM/MyC x1

## 2024-10-02 NOTE — TELEPHONE ENCOUNTER
Left Voicemail (2nd Attempt) and Sent Mychart (2nd Attempt) for the patient to call back and schedule the following:    Appointment type: Return GI  Provider: Taylor Lawrence   Return date: 3 mo (around Dec 2024)- after tests/procedures are completed  Specialty phone number: [phone]  Additional appointment(s) needed:   -procedure  -XR Abdomen 1 View   Additonal Notes: LVMx2/MyC x2

## 2024-10-18 ENCOUNTER — TELEPHONE (OUTPATIENT)
Dept: GASTROENTEROLOGY | Facility: CLINIC | Age: 34
End: 2024-10-18
Payer: MEDICARE

## 2024-10-18 NOTE — TELEPHONE ENCOUNTER
Faxed request for EGD and pathology from March 2023 to [facility] medical records at [phone]. I called their medical records first but they were closed (Ph: [phone]).  Looking in Care Everywhere I can see records for CT abdomen, US pelvis transvaginal, diagnostic laparoscopy and oophorectomy from 3/27/2023 and 3/28/2023 at [facility]. I called their medical records and they are closed on Fridays so I faxed a detailed request for an EGD and pathology report from March 2023 to [facility] medical records.  Adri Smith

## 2024-11-01 ENCOUNTER — TELEPHONE (OUTPATIENT)
Dept: GASTROENTEROLOGY | Facility: CLINIC | Age: 34
End: 2024-11-01
Payer: MEDICARE

## 2024-11-01 NOTE — TELEPHONE ENCOUNTER
"Endoscopy Scheduling Screen    Have you had any respiratory illness or flu-like symptoms in the last 10 days?  No    What is your communication preference for Instructions and/or Bowel Prep?   MyChart    What insurance is in the chart?  Other:  MEDICARE    Ordering/Referring Provider: Taylor Lawrence PA-C in [facility]     (If ordering provider performs procedure, schedule with ordering provider unless otherwise instructed. )    BMI: Estimated body mass index is 30.69 kg/m  as calculated from the following:    Height as of 8/23/24: 1.64 m (5' 4.57\").    Weight as of 9/9/24: 82.6 kg (182 lb).     Sedation Ordered  MAC/deep sedation.   BMI<= 45 45 < BMI <= 48 48 < BMI < = 50  BMI > 50   No Restrictions No [facility]  No [facility]  [place] [facility] with exceptions Hospital Only [place] Only       Do you have a history of malignant hyperthermia?  No    (Females) Are you currently pregnant?   No     Have you been diagnosed or told you have pulmonary hypertension?   No    Do you have an LVAD?  No    Have you been told you have moderate to severe sleep apnea?  No.    Have you been told you have COPD, asthma, or any other lung disease?  No    Do you have any heart conditions?  No     Have you ever had or are you waiting for an organ transplant?  No. Continue scheduling, no site restrictions.    Have you had a stroke or transient ischemic attack (TIA aka \"mini stroke\" in the last 6 months?   No    Have you been diagnosed with or been told you have cirrhosis of the liver?   No.    Are you currently on dialysis?   No    Do you need assistance transferring?   No    BMI: Estimated body mass index is 30.69 kg/m  as calculated from the following:    Height as of 8/23/24: 1.64 m (5' 4.57\").    Weight as of 9/9/24: 82.6 kg (182 lb).     Is patients BMI > 40 and scheduling location [facility]?  No    Do you take an injectable or oral medication for weight loss or diabetes (excluding insulin)?  No    Do you take the medication Naltrexone?  No    Do " you take blood thinners?  No       Prep   Are you currently on dialysis or do you have chronic kidney disease?  No    Do you have a diagnosis of diabetes?  Yes (Golytely Prep)    Do you have a diagnosis of cystic fibrosis (CF)?  No    On a regular basis do you go 3 -5 days between bowel movements?  No    BMI > 40?  No    Preferred Pharmacy:      [id number] [place] [address] AT [facility]  [address]  [facility] [address]  Phone: [phone] Fax: [phone]      Final Scheduling Details     Procedure scheduled  Upper endoscopy (EGD)    Surgeon:  LUCY     Date of procedure:  11/7     Pre-OP / PAC:   No - Not required for this site.    Location  [facility] - Patient preference.    Sedation   MAC/Deep Sedation - Per order.      Patient Reminders:   You will receive a call from a Nurse to review instructions and health history.  This assessment must be completed prior to your procedure.  Failure to complete the Nurse assessment may result in the procedure being cancelled.      On the day of your procedure, please designate an adult(s) who can drive you home stay with you for the next 24 hours. The medicines used in the exam will make you sleepy. You will not be able to drive.      You cannot take public transportation, ride share services, or non-medical taxi service without a responsible caregiver.  Medical transport services are allowed with the requirement that a responsible caregiver will receive you at your destination.  We require that drivers and caregivers are confirmed prior to your procedure.

## 2024-11-01 NOTE — TELEPHONE ENCOUNTER
Pre visit planning completed.    Sent for review - has Echo ordered for tachycardia - has not completed    Procedure details:    Patient scheduled for Upper endoscopy (EGD) on 11/7/24.     Arrival time: 0930. Procedure time 1030    Facility location: [facility]; [address] [place] [address], [address], [place] [address]. Check in location: 5th Floor.    Sedation type: MAC    Pre op exam needed? No.    Indication for procedure: Nausea and vomiting, unspecified vomiting type        Chart review:     Electronic implanted devices? No    Recent diagnosis of diverticulitis within the last 6 weeks? No      Medication review:    Diabetic? Yes. Insulin: Consult with managing provider.     Anticoagulants? No    Weight loss medication/injectable? No GLP-1 medication per patient's medication list.  RN will verify with pre-assessment call.    Other medication HOLDING recommendations:  N/A      Prep for procedure:       Prep instructions sent via tisha Gomez RN  Endoscopy Procedure Pre Assessment RN  [phone] option 2

## 2024-11-04 DIAGNOSIS — E13.9 DIABETES 1.5, MANAGED AS TYPE 1 (H): ICD-10-CM

## 2024-11-04 NOTE — TELEPHONE ENCOUNTER
Twan Richey APRN CRNA Faust, Jennifer L, RN; Lavelle Babcock MD  OK to proceed at ASC    Twan Richey CRNA

## 2024-11-04 NOTE — TELEPHONE ENCOUNTER
Pre assessment completed for upcoming procedure.      Procedure details:    Patient scheduled for Upper endoscopy (EGD) on 11/7/24.     Arrival time: 0930. Procedure time 1030     Facility location: [facility]; [address] [place] [address], [address] Floor, [place] [address]. Check in location: 5th Floor.     Sedation type: MAC     Pre op exam needed? No.    Designated  policy reviewed. Instructed to have someone stay 24 hours post procedure.       Medication review:    Diabetic? Yes. Insulin: Consult with managing provider.     Weight loss medication/injectable? No.      Prep for procedure:       Reviewed procedure prep instructions.     Patient verbalized understanding and had no questions or concerns at this time.        Jojo Gomez RN  Endoscopy Procedure Pre Assessment   [phone] option 2

## 2024-11-05 RX ORDER — INSULIN LISPRO 100 [IU]/ML
INJECTION, SOLUTION INTRAVENOUS; SUBCUTANEOUS
Qty: 10 ML | Refills: 3 | Status: SHIPPED | OUTPATIENT
Start: 2024-11-05

## 2024-11-06 ENCOUNTER — ANESTHESIA EVENT (OUTPATIENT)
Dept: SURGERY | Facility: AMBULATORY SURGERY CENTER | Age: 34
End: 2024-11-06
Payer: MEDICARE

## 2024-11-06 NOTE — ANESTHESIA PREPROCEDURE EVALUATION
"Anesthesia Pre-Procedure Evaluation    Patient: Rufino Farr   MRN: [id number] : 1990        Procedure : Procedure(s):  Esophagoscopy, gastroscopy, duodenoscopy (EGD), combined          Past Medical History:   Diagnosis Date    Depression     with Anxiety    Diabetes (H) 2017    Type 1    Hashimoto's thyroiditis     PTSD (post-traumatic stress disorder)       Past Surgical History:   Procedure Laterality Date    HYSTERECTOMY, PAP NO LONGER INDICATED Bilateral       Allergies   Allergen Reactions    Aspirin Shortness Of Breath    Nickel       Social History     Tobacco Use    Smoking status: Former     Types: Cigarettes     Passive exposure: Past    Smokeless tobacco: Current    Tobacco comments:     Vapes   Substance Use Topics    Alcohol use: Not Currently      Wt Readings from Last 1 Encounters:   24 82.6 kg (182 lb)        Anesthesia Evaluation   Pt has had prior anesthetic. Type of anesthetic: \"I wake up hysterical.\"        ROS/MED HX  ENT/Pulmonary:    (-) recent URI   Neurologic:    (-) no CVA   Cardiovascular:    (-) CHF and syncope   METS/Exercise Tolerance:     Hematologic:    (-) history of blood clots   Musculoskeletal:       GI/Hepatic:    (-) esophageal disease   Renal/Genitourinary:    (-) renal disease   Endo:     (+)  type II DM,        thyroid problem,            Psychiatric/Substance Use:     (+) psychiatric history anxiety       Infectious Disease:    (-) Recent Fever   Malignancy:       Other: Comment: Medical cannibis     (+)  , H/O Chronic Pain,         Physical Exam    Airway             Respiratory Devices and Support         Dental     Comment: Nothing loose or removable    (+) Multiple visibly decayed, broken teeth      Cardiovascular             Pulmonary                   OUTSIDE LABS:  CBC:   Lab Results   Component Value Date    WBC 9.3 2024    HGB 14.0 2024    HCT 39.6 2024     2024     BMP:   Lab Results   Component Value Date    NA " 140 07/12/2024     05/25/2024    POTASSIUM 4.9 07/12/2024    POTASSIUM 3.9 05/25/2024    CHLORIDE 103 07/12/2024    CHLORIDE 102 05/25/2024    CO2 27 07/12/2024    CO2 26 05/25/2024    BUN 13.0 07/12/2024    BUN 12.1 05/25/2024    CR 0.87 07/12/2024    CR 0.98 (H) 05/25/2024     (H) 07/12/2024     (H) 05/27/2024     COAGS:   Lab Results   Component Value Date    PTT 27 05/25/2024    INR 0.96 05/25/2024     POC:   Lab Results   Component Value Date    HCGS Negative 05/25/2024     HEPATIC:   Lab Results   Component Value Date    ALBUMIN 4.7 07/12/2024    PROTTOTAL 7.4 07/12/2024    ALT 25 07/12/2024    AST 29 07/12/2024    ALKPHOS 64 07/12/2024    BILITOTAL 0.4 07/12/2024     OTHER:   Lab Results   Component Value Date    A1C 7.7 (H) 07/12/2024    YESY 9.6 07/12/2024    TSH 9.59 (H) 09/03/2024    T4 1.14 09/03/2024       Anesthesia Plan    ASA Status:  3    NPO Status:  Will be NPO Appropriate at ...    Anesthesia Type: MAC.              Consents    Anesthesia Plan(s) and associated risks, benefits, and realistic alternatives discussed. Questions answered and patient/representative(s) expressed understanding.     - Discussed:     - Discussed with:  Patient            Postoperative Care            Comments:    Other Comments: Precedex/versed prn for emergence delirium           Kingsley Valdes MD    I have reviewed the pertinent notes and labs in the chart from the past 30 days and (re)examined the patient.  Any updates or changes from those notes are reflected in this note.                        # DMII: A1C = N/A within past 6 months

## 2024-11-07 ENCOUNTER — HOSPITAL ENCOUNTER (OUTPATIENT)
Facility: AMBULATORY SURGERY CENTER | Age: 34
Discharge: HOME | End: 2024-11-07
Attending: STUDENT IN AN ORGANIZED HEALTH CARE EDUCATION/TRAINING PROGRAM
Payer: MEDICARE

## 2024-11-07 ENCOUNTER — ANESTHESIA (OUTPATIENT)
Dept: SURGERY | Facility: AMBULATORY SURGERY CENTER | Age: 34
End: 2024-11-07
Payer: MEDICARE

## 2024-11-07 VITALS
TEMPERATURE: 97.1 F | HEIGHT: 65 IN | RESPIRATION RATE: 14 BRPM | WEIGHT: 182 LBS | HEART RATE: 73 BPM | OXYGEN SATURATION: 96 % | DIASTOLIC BLOOD PRESSURE: 58 MMHG | SYSTOLIC BLOOD PRESSURE: 112 MMHG | BODY MASS INDEX: 30.32 KG/M2

## 2024-11-07 VITALS — HEART RATE: 93 BPM

## 2024-11-07 DIAGNOSIS — Z78.0 MENOPAUSE PRESENT: ICD-10-CM

## 2024-11-07 DIAGNOSIS — F41.9 ANXIETY AND DEPRESSION: ICD-10-CM

## 2024-11-07 DIAGNOSIS — F32.A ANXIETY AND DEPRESSION: ICD-10-CM

## 2024-11-07 LAB
GLUCOSE BLDC GLUCOMTR-MCNC: 150 MG/DL (ref 70–99)
UPPER GI ENDOSCOPY: NORMAL

## 2024-11-07 PROCEDURE — 82962 GLUCOSE BLOOD TEST: CPT | Performed by: PATHOLOGY

## 2024-11-07 PROCEDURE — 88305 TISSUE EXAM BY PATHOLOGIST: CPT | Mod: TC | Performed by: STUDENT IN AN ORGANIZED HEALTH CARE EDUCATION/TRAINING PROGRAM

## 2024-11-07 PROCEDURE — 43239 EGD BIOPSY SINGLE/MULTIPLE: CPT | Performed by: NURSE ANESTHETIST, CERTIFIED REGISTERED

## 2024-11-07 PROCEDURE — 88305 TISSUE EXAM BY PATHOLOGIST: CPT | Mod: 26 | Performed by: PATHOLOGY

## 2024-11-07 PROCEDURE — 43239 EGD BIOPSY SINGLE/MULTIPLE: CPT | Performed by: ANESTHESIOLOGY

## 2024-11-07 PROCEDURE — 88342 IMHCHEM/IMCYTCHM 1ST ANTB: CPT | Mod: 26 | Performed by: PATHOLOGY

## 2024-11-07 RX ORDER — NALOXONE HYDROCHLORIDE 0.4 MG/ML
0.2 INJECTION, SOLUTION INTRAMUSCULAR; INTRAVENOUS; SUBCUTANEOUS
Status: DISCONTINUED | OUTPATIENT
Start: 2024-11-07 | End: 2024-11-08 | Stop reason: HOSPADM

## 2024-11-07 RX ORDER — FLUMAZENIL 0.1 MG/ML
0.2 INJECTION, SOLUTION INTRAVENOUS
Status: ACTIVE | OUTPATIENT
Start: 2024-11-07 | End: 2024-11-07

## 2024-11-07 RX ORDER — PROCHLORPERAZINE MALEATE 10 MG
10 TABLET ORAL EVERY 6 HOURS PRN
Status: DISCONTINUED | OUTPATIENT
Start: 2024-11-07 | End: 2024-11-08 | Stop reason: HOSPADM

## 2024-11-07 RX ORDER — PROPOFOL 10 MG/ML
INJECTION, EMULSION INTRAVENOUS CONTINUOUS PRN
Status: DISCONTINUED | OUTPATIENT
Start: 2024-11-07 | End: 2024-11-07

## 2024-11-07 RX ORDER — PROPOFOL 10 MG/ML
INJECTION, EMULSION INTRAVENOUS PRN
Status: DISCONTINUED | OUTPATIENT
Start: 2024-11-07 | End: 2024-11-07

## 2024-11-07 RX ORDER — ONDANSETRON 2 MG/ML
4 INJECTION INTRAMUSCULAR; INTRAVENOUS
Status: DISCONTINUED | OUTPATIENT
Start: 2024-11-07 | End: 2024-11-07 | Stop reason: HOSPADM

## 2024-11-07 RX ORDER — AMITRIPTYLINE HYDROCHLORIDE 100 MG/1
100 TABLET ORAL AT BEDTIME
Qty: 30 TABLET | Refills: 3 | Status: SHIPPED | OUTPATIENT
Start: 2024-11-07

## 2024-11-07 RX ORDER — LIDOCAINE HYDROCHLORIDE 20 MG/ML
INJECTION, SOLUTION INFILTRATION; PERINEURAL PRN
Status: DISCONTINUED | OUTPATIENT
Start: 2024-11-07 | End: 2024-11-07

## 2024-11-07 RX ORDER — LIDOCAINE 40 MG/G
CREAM TOPICAL
Status: DISCONTINUED | OUTPATIENT
Start: 2024-11-07 | End: 2024-11-07 | Stop reason: HOSPADM

## 2024-11-07 RX ORDER — GLYCOPYRROLATE 0.2 MG/ML
INJECTION, SOLUTION INTRAMUSCULAR; INTRAVENOUS PRN
Status: DISCONTINUED | OUTPATIENT
Start: 2024-11-07 | End: 2024-11-07

## 2024-11-07 RX ORDER — NALOXONE HYDROCHLORIDE 0.4 MG/ML
0.4 INJECTION, SOLUTION INTRAMUSCULAR; INTRAVENOUS; SUBCUTANEOUS
Status: DISCONTINUED | OUTPATIENT
Start: 2024-11-07 | End: 2024-11-08 | Stop reason: HOSPADM

## 2024-11-07 RX ORDER — ESTRADIOL 1 MG/1
TABLET ORAL
Qty: 30 TABLET | Refills: 3 | Status: SHIPPED | OUTPATIENT
Start: 2024-11-07

## 2024-11-07 RX ORDER — ONDANSETRON 2 MG/ML
4 INJECTION INTRAMUSCULAR; INTRAVENOUS EVERY 6 HOURS PRN
Status: DISCONTINUED | OUTPATIENT
Start: 2024-11-07 | End: 2024-11-08 | Stop reason: HOSPADM

## 2024-11-07 RX ORDER — SODIUM CHLORIDE, SODIUM LACTATE, POTASSIUM CHLORIDE, CALCIUM CHLORIDE 600; 310; 30; 20 MG/100ML; MG/100ML; MG/100ML; MG/100ML
INJECTION, SOLUTION INTRAVENOUS CONTINUOUS PRN
Status: DISCONTINUED | OUTPATIENT
Start: 2024-11-07 | End: 2024-11-07

## 2024-11-07 RX ORDER — ONDANSETRON 4 MG/1
4 TABLET, ORALLY DISINTEGRATING ORAL EVERY 6 HOURS PRN
Status: DISCONTINUED | OUTPATIENT
Start: 2024-11-07 | End: 2024-11-08 | Stop reason: HOSPADM

## 2024-11-07 RX ADMIN — PROPOFOL 200 MCG/KG/MIN: 10 INJECTION, EMULSION INTRAVENOUS at 10:34

## 2024-11-07 RX ADMIN — PROPOFOL 40 MG: 10 INJECTION, EMULSION INTRAVENOUS at 10:35

## 2024-11-07 RX ADMIN — GLYCOPYRROLATE 0.2 MG: 0.2 INJECTION, SOLUTION INTRAMUSCULAR; INTRAVENOUS at 10:37

## 2024-11-07 RX ADMIN — PROPOFOL 100 MG: 10 INJECTION, EMULSION INTRAVENOUS at 10:39

## 2024-11-07 RX ADMIN — PROPOFOL 60 MG: 10 INJECTION, EMULSION INTRAVENOUS at 10:34

## 2024-11-07 RX ADMIN — LIDOCAINE HYDROCHLORIDE 60 MG: 20 INJECTION, SOLUTION INFILTRATION; PERINEURAL at 10:34

## 2024-11-07 RX ADMIN — SODIUM CHLORIDE, SODIUM LACTATE, POTASSIUM CHLORIDE, CALCIUM CHLORIDE: 600; 310; 30; 20 INJECTION, SOLUTION INTRAVENOUS at 10:30

## 2024-11-07 RX ADMIN — SODIUM CHLORIDE, SODIUM LACTATE, POTASSIUM CHLORIDE, CALCIUM CHLORIDE: 600; 310; 30; 20 INJECTION, SOLUTION INTRAVENOUS at 10:34

## 2024-11-07 NOTE — H&P
Rufino DANIEL Chika  [id number]  female  34 year old      Reason for procedure/surgery: EGD for nausea and vomiting  TTG IgA negative    Patient Active Problem List   Diagnosis    Complicated migraine, intractable    Myofascial pain    Diabetes mellitus, type 2 (H)    Type 1 diabetes mellitus without complication (H)    Hypothyroidism due to Hashimoto's thyroiditis       Past Surgical History:    Past Surgical History:   Procedure Laterality Date    HYSTERECTOMY, PAP NO LONGER INDICATED Bilateral        Past Medical History:   Past Medical History:   Diagnosis Date    Depression     with Anxiety    Diabetes (H) 2017    Type 1    Hashimoto's thyroiditis 2010    PTSD (post-traumatic stress disorder)        Social History:   Social History     Tobacco Use    Smoking status: Former     Types: Cigarettes     Passive exposure: Past    Smokeless tobacco: Current    Tobacco comments:     Vapes   Substance Use Topics    Alcohol use: Not Currently       Family History:   Family History   Problem Relation Age of Onset    Glaucoma No family hx of     Macular Degeneration No family hx of        Allergies:   Allergies   Allergen Reactions    Aspirin Shortness Of Breath    Nickel        Active Medications:   Current Outpatient Medications   Medication Sig Dispense Refill    acetaminophen (TYLENOL) 325 MG tablet Take 2 tablets (650 mg) by mouth every 4 hours as needed for mild pain or fever      amitriptyline (ELAVIL) 100 MG tablet Take 1 tablet (100 mg) by mouth at bedtime 30 tablet 3    calcium carbonate (TUMS) 500 MG chewable tablet Take 1 tablet (500 mg) by mouth 4 times daily as needed for heartburn      cetirizine (ZYRTEC) 10 MG tablet Take 1 tablet (10 mg) by mouth every evening. 30 tablet 3    estradiol (ESTRACE) 1 MG tablet Take 1 tablet (1 mg) by mouth every evening 30 tablet 3    HUMALOG 100 UNIT/ML injection INJECT 1 UNIT FOUR TIMES DAILY(WITH MEALS AND NIGHTLY) MAXIMUM DOSE OF 65 UNITS VIA INSULIN PUMP AS DIRECTED 10 mL 3  "   ibuprofen (ADVIL/MOTRIN) 800 MG tablet Take 1 tablet (800 mg) by mouth every 6 hours as needed for inflammatory pain      indomethacin (INDOCIN) 50 MG capsule Take 100 mg by mouth 2 times daily as needed for moderate pain      levothyroxine (SYNTHROID/LEVOTHROID) 137 MCG tablet Take 1 tablet (137 mcg) by mouth daily. 90 tablet 3    metoprolol succinate ER (TOPROL XL) 25 MG 24 hr tablet Take 1 tablet (25 mg) by mouth daily. 30 tablet 3    ondansetron (ZOFRAN ODT) 4 MG ODT tab Take 1 tablet (4 mg) by mouth every 8 hours as needed for nausea 14 tablet 0    tiZANidine (ZANAFLEX) 4 MG tablet Take 1 tablet (4 mg) by mouth 3 times daily 90 tablet 3    blood glucose (NO BRAND SPECIFIED) test strip Use to test blood sugar 4 times daily while off sensor 100 strip 11    blood glucose monitoring (NO BRAND SPECIFIED) meter device kit Use to test blood sugar 4 times daily or as directed. 1 kit 0    Continuous Glucose  (DEXCOM G7 ) ASCENCION Use to read blood sugars as per 's instructions. 1 each 0    Continuous Glucose Sensor (DEXCOM G7 SENSOR) MISC Change every 10 days. 3 each 5    Insulin Infusion Pump Supplies (AUTOSOFT 90 INFUSION SET) MISC 1 each every 3 days. 23\" 6 mm 10 each 11    Insulin Infusion Pump Supplies (T:SLIM X2 3ML CARTRIDGE) MISC 1 each every 3 days. 10 each 11    metroNIDAZOLE (METROGEL) 0.75 % vaginal gel Place vaginally twice a week As needed         Systemic Review:   CONSTITUTIONAL: NEGATIVE for fever, chills, change in weight  ENT/MOUTH: NEGATIVE for ear, mouth and throat problems  RESP: NEGATIVE for significant cough or SOB  CV: NEGATIVE for chest pain, palpitations or peripheral edema    Physical Examination:   Vital Signs: /79 (BP Location: Right arm)   Pulse 73   Temp 97.4  F (36.3  C) (Temporal)   Resp 16   Ht 1.638 m (5' 4.5\")   Wt 82.6 kg (182 lb)   SpO2 100%   BMI 30.76 kg/m    GENERAL: healthy, alert and no distress  NECK: no adenopathy, no asymmetry, " masses, or scars  RESP: lungs clear to auscultation - no rales, rhonchi or wheezes  CV: regular rate and rhythm, normal S1 S2, no S3 or S4, no murmur, click or rub, no peripheral edema and peripheral pulses strong  ABDOMEN: soft, nontender, no hepatosplenomegaly, no masses and bowel sounds normal  MS: no gross musculoskeletal defects noted, no edema      Plan: Appropriate to proceed as scheduled.      Aida Bermudez MD  11/7/2024    PCP:  Sofia Anne

## 2024-11-07 NOTE — ANESTHESIA CARE TRANSFER NOTE
Patient: Rufino Farr    Procedure: Procedure(s):  ESOPHAGOGASTRODUODENOSCOPY, WITH BIOPSY       Diagnosis: Nausea and vomiting, unspecified vomiting type [R11.2]  Diagnosis Additional Information: No value filed.    Anesthesia Type:   MAC     Note:    Oropharynx: oropharynx clear of all foreign objects  Level of Consciousness: awake  Oxygen Supplementation: room air    Independent Airway: airway patency satisfactory and stable  Dentition: dentition unchanged  Vital Signs Stable: post-procedure vital signs reviewed and stable  Report to RN Given: handoff report given  Patient transferred to: Phase II    Handoff Report: Identifed the Patient, Identified the Reponsible Provider, Reviewed the pertinent medical history, Discussed the surgical course, Reviewed Intra-OP anesthesia mangement and issues during anesthesia, Set expectations for post-procedure period and Allowed opportunity for questions and acknowledgement of understanding      Vitals:  Vitals Value Taken Time   BP     Temp     Pulse     Resp     SpO2         Electronically Signed By: FORTUNATO Eldridge CRNA  November 7, 2024  10:48 AM

## 2024-11-07 NOTE — ANESTHESIA POSTPROCEDURE EVALUATION
Patient: Rufino Farr    Procedure: Procedure(s):  ESOPHAGOGASTRODUODENOSCOPY, WITH BIOPSY       Anesthesia Type:  MAC    Note:  Disposition: Outpatient   Postop Pain Control: Uneventful            Sign Out: Well controlled pain   PONV: No   Neuro/Psych: Uneventful            Sign Out: Acceptable/Baseline neuro status   Airway/Respiratory: Uneventful            Sign Out: Acceptable/Baseline resp. status   CV/Hemodynamics: Uneventful            Sign Out: Acceptable CV status; No obvious hypovolemia; No obvious fluid overload   Other NRE: NONE   DID A NON-ROUTINE EVENT OCCUR? No           Last vitals:  Vitals Value Taken Time   /58 11/07/24 1047   Temp 36.2  C (97.1  F) 11/07/24 1047   Pulse     Resp 14 11/07/24 1047   SpO2 96 % 11/07/24 1047       Electronically Signed By: Kingsley Valdes MD  November 7, 2024  11:37 AM

## 2024-11-11 LAB
PATH REPORT.ADDENDUM SPEC: NORMAL
PATH REPORT.COMMENTS IMP SPEC: NORMAL
PATH REPORT.COMMENTS IMP SPEC: NORMAL
PATH REPORT.FINAL DX SPEC: NORMAL
PATH REPORT.GROSS SPEC: NORMAL
PATH REPORT.MICROSCOPIC SPEC OTHER STN: NORMAL
PATH REPORT.RELEVANT HX SPEC: NORMAL
PHOTO IMAGE: NORMAL

## 2024-11-14 ENCOUNTER — CARE COORDINATION (OUTPATIENT)
Dept: CARDIOLOGY | Facility: CLINIC | Age: 34
End: 2024-11-14

## 2024-11-14 ENCOUNTER — OFFICE VISIT (OUTPATIENT)
Dept: CARDIOLOGY | Facility: CLINIC | Age: 34
End: 2024-11-14
Attending: INTERNAL MEDICINE
Payer: MEDICARE

## 2024-11-14 VITALS
DIASTOLIC BLOOD PRESSURE: 80 MMHG | HEIGHT: 65 IN | BODY MASS INDEX: 30.87 KG/M2 | SYSTOLIC BLOOD PRESSURE: 126 MMHG | WEIGHT: 185.3 LBS | HEART RATE: 133 BPM | OXYGEN SATURATION: 99 %

## 2024-11-14 DIAGNOSIS — R00.0 SINUS TACHYCARDIA: ICD-10-CM

## 2024-11-14 RX ORDER — METOPROLOL SUCCINATE 25 MG/1
25 TABLET, EXTENDED RELEASE ORAL 2 TIMES DAILY
Qty: 180 TABLET | Refills: 3 | Status: SHIPPED | OUTPATIENT
Start: 2024-11-14

## 2024-11-14 NOTE — PROGRESS NOTES
CARDIOLOGY CLINIC PROGRESS NOTE    DOS: 2024      Rufino Farr  : 1990, 34 year old  MRN: [id number]      Primary cardiologist: Dr. Velazquez       History: 34-year-old female seen for tachycardia.  She has type 1 diabetes diagnosed age 24.     She had COVID in .  About 5-6 months after that, she noted she would get sweaty and HR would increase and she would have palpitations. She has a smart watch and checks that.  Resting heart rate will typically be in the 80s to 90s, but heart rate can easily go to 120 or even higher with little activity.  She will have some intermittent sweating and feel lightheadedness when she stands up.  She denies overt syncope or chest pain.  She admits to drinking a fair amount of fluid, and avoiding excessive caffeine, alcohol, or stimulants.     Zio monitor 2024 showed sinus rhythm, average heart rate 91, no arrhythmia, minimal ectopy, many patient triggers all during sinus rhythm.    Dr. Velazquez ordered a trial of Toprol XL 25 mg, an echo and serum metanephrines.     Serum metanephrines normal.   She did not show up for her echo.     She presents today for follow up.   She is on Toprol XL 25 mg.   Her rates are improved a bit, instead of going up to 200, now it is more so 170s.   She gets tingly legs, and sweaty.   She has not done the echo due to cost. But plans on doing it.     We checked vitals:  Sitting: /82, HR 76  Standing immediate:  /98,   Standing 1 minute:  /80,     We did not get to 3 minutes or 5 minutes as she was quite symptomatic already.           ROS:  Skin:        Eyes:       ENT:       Respiratory:  Positive for shortness of breath  Cardiovascular:    Positive for;fatigue;lightheadedness;dizziness;syncope or near-syncope;palpitations  Gastroenterology:      Genitourinary:       Musculoskeletal:       Neurologic:       Psychiatric:       Heme/Lymph/Imm:       Endocrine:         PAST MEDICAL HISTORY:  Past  Medical History:   Diagnosis Date    Depression     with Anxiety    Diabetes (H) 2017    Type 1    Hashimoto's thyroiditis 2010    PTSD (post-traumatic stress disorder)        PAST SURGICAL HISTORY:  Past Surgical History:   Procedure Laterality Date    ESOPHAGOSCOPY, GASTROSCOPY, DUODENOSCOPY (EGD), COMBINED N/A 11/7/2024    Procedure: ESOPHAGOGASTRODUODENOSCOPY, WITH BIOPSY;  Surgeon: Aida Bermudez MD;  Location: [facility] OR    HYSTERECTOMY, PAP NO LONGER INDICATED Bilateral        SOCIAL HISTORY:  Social History     Socioeconomic History    Marital status: Single   Tobacco Use    Smoking status: Former     Types: Cigarettes     Passive exposure: Past    Smokeless tobacco: Current    Tobacco comments:     Vapes   Vaping Use    Vaping status: Every Day    Substances: Flavoring    Devices: Disposable   Substance and Sexual Activity    Alcohol use: Not Currently    Drug use: Yes     Types: Marijuana     Social Drivers of Health     Financial Resource Strain: Low Risk  (7/12/2024)    Financial Resource Strain     Within the past 12 months, have you or your family members you live with been unable to get utilities (heat, electricity) when it was really needed?: No   Food Insecurity: High Risk (7/12/2024)    Food Insecurity     Within the past 12 months, did you worry that your food would run out before you got money to buy more?: Yes     Within the past 12 months, did the food you bought just not last and you didn t have money to get more?: Yes   Transportation Needs: Low Risk  (7/12/2024)    Transportation Needs     Within the past 12 months, has lack of transportation kept you from medical appointments, getting your medicines, non-medical meetings or appointments, work, or from getting things that you need?: No   Physical Activity: Insufficiently Active (7/12/2024)    Exercise Vital Sign     Days of Exercise per Week: 3 days     Minutes of Exercise per Session: 20 min   Stress: Stress Concern Present (7/12/2024)     [employer] [place] [employer] - Occupational Stress Questionnaire     Feeling of Stress : Rather much   Social Connections: Unknown (7/12/2024)    Social Connection and Isolation Panel [NHANES]     Frequency of Social Gatherings with Friends and Family: Once a week   Interpersonal Safety: Low Risk  (11/7/2024)    Interpersonal Safety     Do you feel physically and emotionally safe where you currently live?: Yes     Within the past 12 months, have you been hit, slapped, kicked or otherwise physically hurt by someone?: No     Within the past 12 months, have you been humiliated or emotionally abused in other ways by your partner or ex-partner?: No   Housing Stability: Low Risk  (7/12/2024)    Housing Stability     Do you have housing? : Yes     Are you worried about losing your housing?: No       FAMILY HISTORY:  Family History   Problem Relation Age of Onset    Glaucoma No family hx of     Macular Degeneration No family hx of        MEDS:   Current Outpatient Medications   Medication Sig Dispense Refill    acetaminophen (TYLENOL) 325 MG tablet Take 2 tablets (650 mg) by mouth every 4 hours as needed for mild pain or fever      amitriptyline (ELAVIL) 100 MG tablet TAKE 1 TABLET(100 MG) BY MOUTH AT BEDTIME 30 tablet 3    blood glucose (NO BRAND SPECIFIED) test strip Use to test blood sugar 4 times daily while off sensor 100 strip 11    blood glucose monitoring (NO BRAND SPECIFIED) meter device kit Use to test blood sugar 4 times daily or as directed. 1 kit 0    calcium carbonate (TUMS) 500 MG chewable tablet Take 1 tablet (500 mg) by mouth 4 times daily as needed for heartburn      cetirizine (ZYRTEC) 10 MG tablet Take 1 tablet (10 mg) by mouth every evening. 30 tablet 3    Continuous Glucose  (DEXCOM G7 ) ASCENCION Use to read blood sugars as per 's instructions. 1 each 0    Continuous Glucose Sensor (DEXCOM G7 SENSOR) MISC Change every 10 days. 3 each 5    estradiol (ESTRACE) 1 MG  "tablet TAKE 1 TABLET(1 MG) BY MOUTH EVERY EVENING 30 tablet 3    HUMALOG 100 UNIT/ML injection INJECT 1 UNIT FOUR TIMES DAILY(WITH MEALS AND NIGHTLY) MAXIMUM DOSE OF 65 UNITS VIA INSULIN PUMP AS DIRECTED 10 mL 3    ibuprofen (ADVIL/MOTRIN) 800 MG tablet Take 1 tablet (800 mg) by mouth every 6 hours as needed for inflammatory pain      indomethacin (INDOCIN) 50 MG capsule Take 100 mg by mouth 2 times daily as needed for moderate pain      Insulin Infusion Pump Supplies (AUTOSOFT 90 INFUSION SET) MISC 1 each every 3 days. 23\" 6 mm 10 each 11    Insulin Infusion Pump Supplies (T:SLIM X2 3ML CARTRIDGE) MISC 1 each every 3 days. 10 each 11    levothyroxine (SYNTHROID/LEVOTHROID) 137 MCG tablet Take 1 tablet (137 mcg) by mouth daily. 90 tablet 3    metoprolol succinate ER (TOPROL XL) 25 MG 24 hr tablet Take 1 tablet (25 mg) by mouth 2 times daily. 180 tablet 3    metroNIDAZOLE (METROGEL) 0.75 % vaginal gel Place vaginally twice a week As needed      ondansetron (ZOFRAN ODT) 4 MG ODT tab Take 1 tablet (4 mg) by mouth every 8 hours as needed for nausea 14 tablet 0    tiZANidine (ZANAFLEX) 4 MG tablet Take 1 tablet (4 mg) by mouth 3 times daily 90 tablet 3     No current facility-administered medications for this visit.       ALLERGIES:   Allergies   Allergen Reactions    Aspirin Shortness Of Breath    Nickel        PHYSICAL EXAM:  Vitals: /80 (BP Location: Right arm, Patient Position: Standing, Cuff Size: Adult Regular)   Pulse (!) 133   Ht 1.638 m (5' 4.5\")   Wt 84.1 kg (185 lb 4.8 oz)   SpO2 99%   BMI 31.32 kg/m    Constitutional:  cooperative, alert and oriented, well developed, well nourished, in no acute distress        Skin:  warm and dry to the touch, no apparent skin lesions or masses noted   tattoos    Head:  normocephalic, no masses or lesions        Eyes:  pupils equal and round;conjunctivae and lids unremarkable;sclera white        ENT:  no pallor or cyanosis        Neck:  JVP normal    "     Respiratory:  clear to auscultation        Cardiac: regular rhythm, normal S1/S2, no S3 or S4, apical impulse not displaced, no murmurs, gallops or rubs                  GI:  abdomen soft;BS normoactive        Vascular:                                        Extremities and Musculoskeletal:  no edema;normal muscle strength and tone        Neurological:  no gross motor deficits            LABS/DATA:  I reviewed the following:  Component      Latest Ref Rng 9/3/2024  12:15 PM   Metanephrine      0.00 - 0.49 nmol/L 0.24    Normetanephrine      0.00 - 0.89 nmol/L 0.77    Metanephrines Interpretation See Note        Zio 7/2024:  Conclusion  No significant sustained arrhythmias recorded.  Symptomatic episodes corresponded mostly to sinus rhythm and sinus tachycardia  Average HR 91 bpm.             ASSESSMENT/PLAN:  34-year-old female with DM1, seen for tachycardia.   She seems to have intermittent tachycardia. There were no arrhythmias on her Zio monitor.  HR here sitting is 86, standing immediately is 123, and after 1 minute is 133.   No drop in BP.   No obvious pain, dehydration, infection, or other abnormality that may be driving up her heart rate. Her thyroid is close to normal.   Serum metanephrines are WNL, so no pheochromocytoma.  She still needs to have an echo to rule out any structural heart disease.     She did have some mild improvement in Toprol XL 25 mg. She will trial 25 mg BID.   We discussed trialing compression.   She is to maintain hydration.   We will have her see Dr. Cali.   I have sent a message to pharmacy re [facility].           Angela Xiong PA-C

## 2024-11-14 NOTE — PROGRESS NOTES
Hi,   Can you let me know cost of Corlanor for this patient?    Thanks,   Angela Xiong PA-C 11/14/2024 4:13 PM

## 2024-11-14 NOTE — PATIENT INSTRUCTIONS
Wear compression.   Get echo.   Continue to maintain hydration.   You can trial increasing Toprol XL from 25 mg daily to 25 mg twice daily.     We will have you see Dr. Cali after the echo.      MEDICINE

## 2024-11-14 NOTE — LETTER
2024    Sofia Dayana, APRN CNP  [address] [facility] [place] [address]    RE: Rufino Quinterossamir       Dear Colleague,     I had the pleasure of seeing Rufino Farr in the [facility].      CARDIOLOGY CLINIC PROGRESS NOTE    DOS: 2024      Rufino Farr  : 1990, 34 year old  MRN: [id number]      Primary cardiologist: Dr. Velazquez       History: 34-year-old female seen for tachycardia.  She has type 1 diabetes diagnosed age 24.     She had COVID in .  About 5-6 months after that, she noted she would get sweaty and HR would increase and she would have palpitations. She has a smart watch and checks that.  Resting heart rate will typically be in the 80s to 90s, but heart rate can easily go to 120 or even higher with little activity.  She will have some intermittent sweating and feel lightheadedness when she stands up.  She denies overt syncope or chest pain.  She admits to drinking a fair amount of fluid, and avoiding excessive caffeine, alcohol, or stimulants.     Zio monitor 2024 showed sinus rhythm, average heart rate 91, no arrhythmia, minimal ectopy, many patient triggers all during sinus rhythm.    Dr. Velazquez ordered a trial of Toprol XL 25 mg, an echo and serum metanephrines.     Serum metanephrines normal.   She did not show up for her echo.     She presents today for follow up.   She is on Toprol XL 25 mg.   Her rates are improved a bit, instead of going up to 200, now it is more so 170s.   She gets tingly legs, and sweaty.   She has not done the echo due to cost. But plans on doing it.     We checked vitals:  Sitting: /82, HR 76  Standing immediate:  /98,   Standing 1 minute:  /80,     We did not get to 3 minutes or 5 minutes as she was quite symptomatic already.           ROS:  Skin:        Eyes:       ENT:       Respiratory:  Positive for shortness of breath  Cardiovascular:    Positive  for;fatigue;lightheadedness;dizziness;syncope or near-syncope;palpitations  Gastroenterology:      Genitourinary:       Musculoskeletal:       Neurologic:       Psychiatric:       Heme/Lymph/Imm:       Endocrine:         PAST MEDICAL HISTORY:  Past Medical History:   Diagnosis Date     Depression     with Anxiety     Diabetes (H) 2017    Type 1     Hashimoto's thyroiditis 2010     PTSD (post-traumatic stress disorder)        PAST SURGICAL HISTORY:  Past Surgical History:   Procedure Laterality Date     ESOPHAGOSCOPY, GASTROSCOPY, DUODENOSCOPY (EGD), COMBINED N/A 11/7/2024    Procedure: ESOPHAGOGASTRODUODENOSCOPY, WITH BIOPSY;  Surgeon: Aida Bermudez MD;  Location: [facility] OR     HYSTERECTOMY, PAP NO LONGER INDICATED Bilateral        SOCIAL HISTORY:  Social History     Socioeconomic History     Marital status: Single   Tobacco Use     Smoking status: Former     Types: Cigarettes     Passive exposure: Past     Smokeless tobacco: Current     Tobacco comments:     Vapes   Vaping Use     Vaping status: Every Day     Substances: Flavoring     Devices: Disposable   Substance and Sexual Activity     Alcohol use: Not Currently     Drug use: Yes     Types: Marijuana     Social Drivers of Health     Financial Resource Strain: Low Risk  (7/12/2024)    Financial Resource Strain      Within the past 12 months, have you or your family members you live with been unable to get utilities (heat, electricity) when it was really needed?: No   Food Insecurity: High Risk (7/12/2024)    Food Insecurity      Within the past 12 months, did you worry that your food would run out before you got money to buy more?: Yes      Within the past 12 months, did the food you bought just not last and you didn t have money to get more?: Yes   Transportation Needs: Low Risk  (7/12/2024)    Transportation Needs      Within the past 12 months, has lack of transportation kept you from medical appointments, getting your medicines, non-medical meetings or  appointments, work, or from getting things that you need?: No   Physical Activity: Insufficiently Active (7/12/2024)    Exercise Vital Sign      Days of Exercise per Week: 3 days      Minutes of Exercise per Session: 20 min   Stress: Stress Concern Present (7/12/2024)    [employer] [place] [employer] - Occupational Stress Questionnaire      Feeling of Stress : Rather much   Social Connections: Unknown (7/12/2024)    Social Connection and Isolation Panel [NHANES]      Frequency of Social Gatherings with Friends and Family: Once a week   Interpersonal Safety: Low Risk  (11/7/2024)    Interpersonal Safety      Do you feel physically and emotionally safe where you currently live?: Yes      Within the past 12 months, have you been hit, slapped, kicked or otherwise physically hurt by someone?: No      Within the past 12 months, have you been humiliated or emotionally abused in other ways by your partner or ex-partner?: No   Housing Stability: Low Risk  (7/12/2024)    Housing Stability      Do you have housing? : Yes      Are you worried about losing your housing?: No       FAMILY HISTORY:  Family History   Problem Relation Age of Onset     Glaucoma No family hx of      Macular Degeneration No family hx of        MEDS:   Current Outpatient Medications   Medication Sig Dispense Refill     acetaminophen (TYLENOL) 325 MG tablet Take 2 tablets (650 mg) by mouth every 4 hours as needed for mild pain or fever       amitriptyline (ELAVIL) 100 MG tablet TAKE 1 TABLET(100 MG) BY MOUTH AT BEDTIME 30 tablet 3     blood glucose (NO BRAND SPECIFIED) test strip Use to test blood sugar 4 times daily while off sensor 100 strip 11     blood glucose monitoring (NO BRAND SPECIFIED) meter device kit Use to test blood sugar 4 times daily or as directed. 1 kit 0     calcium carbonate (TUMS) 500 MG chewable tablet Take 1 tablet (500 mg) by mouth 4 times daily as needed for heartburn       cetirizine (ZYRTEC) 10 MG tablet Take 1 tablet  "(10 mg) by mouth every evening. 30 tablet 3     Continuous Glucose  (DEXCOM G7 ) ASCENCION Use to read blood sugars as per 's instructions. 1 each 0     Continuous Glucose Sensor (DEXCOM G7 SENSOR) MISC Change every 10 days. 3 each 5     estradiol (ESTRACE) 1 MG tablet TAKE 1 TABLET(1 MG) BY MOUTH EVERY EVENING 30 tablet 3     HUMALOG 100 UNIT/ML injection INJECT 1 UNIT FOUR TIMES DAILY(WITH MEALS AND NIGHTLY) MAXIMUM DOSE OF 65 UNITS VIA INSULIN PUMP AS DIRECTED 10 mL 3     ibuprofen (ADVIL/MOTRIN) 800 MG tablet Take 1 tablet (800 mg) by mouth every 6 hours as needed for inflammatory pain       indomethacin (INDOCIN) 50 MG capsule Take 100 mg by mouth 2 times daily as needed for moderate pain       Insulin Infusion Pump Supplies (AUTOSOFT 90 INFUSION SET) MISC 1 each every 3 days. 23\" 6 mm 10 each 11     Insulin Infusion Pump Supplies (T:SLIM X2 3ML CARTRIDGE) MISC 1 each every 3 days. 10 each 11     levothyroxine (SYNTHROID/LEVOTHROID) 137 MCG tablet Take 1 tablet (137 mcg) by mouth daily. 90 tablet 3     metoprolol succinate ER (TOPROL XL) 25 MG 24 hr tablet Take 1 tablet (25 mg) by mouth 2 times daily. 180 tablet 3     metroNIDAZOLE (METROGEL) 0.75 % vaginal gel Place vaginally twice a week As needed       ondansetron (ZOFRAN ODT) 4 MG ODT tab Take 1 tablet (4 mg) by mouth every 8 hours as needed for nausea 14 tablet 0     tiZANidine (ZANAFLEX) 4 MG tablet Take 1 tablet (4 mg) by mouth 3 times daily 90 tablet 3     No current facility-administered medications for this visit.       ALLERGIES:   Allergies   Allergen Reactions     Aspirin Shortness Of Breath     Nickel        PHYSICAL EXAM:  Vitals: /80 (BP Location: Right arm, Patient Position: Standing, Cuff Size: Adult Regular)   Pulse (!) 133   Ht 1.638 m (5' 4.5\")   Wt 84.1 kg (185 lb 4.8 oz)   SpO2 99%   BMI 31.32 kg/m    Constitutional:  cooperative, alert and oriented, well developed, well nourished, in no acute distress    "     Skin:  warm and dry to the touch, no apparent skin lesions or masses noted   tattoos    Head:  normocephalic, no masses or lesions        Eyes:  pupils equal and round;conjunctivae and lids unremarkable;sclera white        ENT:  no pallor or cyanosis        Neck:  JVP normal        Respiratory:  clear to auscultation        Cardiac: regular rhythm, normal S1/S2, no S3 or S4, apical impulse not displaced, no murmurs, gallops or rubs                  GI:  abdomen soft;BS normoactive        Vascular:                                        Extremities and Musculoskeletal:  no edema;normal muscle strength and tone        Neurological:  no gross motor deficits            LABS/DATA:  I reviewed the following:  Component      Latest Ref Rng 9/3/2024  12:15 PM   Metanephrine      0.00 - 0.49 nmol/L 0.24    Normetanephrine      0.00 - 0.89 nmol/L 0.77    Metanephrines Interpretation See Note        Zio 7/2024:  Conclusion  No significant sustained arrhythmias recorded.  Symptomatic episodes corresponded mostly to sinus rhythm and sinus tachycardia  Average HR 91 bpm.             ASSESSMENT/PLAN:  34-year-old female with DM1, seen for tachycardia.   She seems to have intermittent tachycardia. There were no arrhythmias on her Zio monitor.  HR here sitting is 86, standing immediately is 123, and after 1 minute is 133.   No drop in BP.   No obvious pain, dehydration, infection, or other abnormality that may be driving up her heart rate. Her thyroid is close to normal.   Serum metanephrines are WNL, so no pheochromocytoma.  She still needs to have an echo to rule out any structural heart disease.     She did have some mild improvement in Toprol XL 25 mg. She will trial 25 mg BID.   We discussed trialing compression.   She is to maintain hydration.   We will have her see Dr. Cali.   I have sent a message to pharmacy re cost of [facility].           Angela Xiong PA-C          Thank you for allowing me to participate in the  care of your patient.      Sincerely,     Angela Xiong PA-C     [facility]  cc:   Chivo Velazquez MD  No address on file

## 2024-11-15 NOTE — TELEPHONE ENCOUNTER
Patient has Medicare D through [facility].    Ivabradine:  $1.55/mo.     Dedra Deng  Pharmacy Technician/Liaison, Discharge Pharmacy   [phone] (voice or text)  [email or web address]

## 2024-11-19 ENCOUNTER — OFFICE VISIT (OUTPATIENT)
Dept: RHEUMATOLOGY | Facility: CLINIC | Age: 34
End: 2024-11-19
Payer: MEDICARE

## 2024-11-19 VITALS
OXYGEN SATURATION: 100 % | BODY MASS INDEX: 30.66 KG/M2 | SYSTOLIC BLOOD PRESSURE: 117 MMHG | HEIGHT: 65 IN | WEIGHT: 184 LBS | DIASTOLIC BLOOD PRESSURE: 76 MMHG | HEART RATE: 86 BPM

## 2024-11-19 DIAGNOSIS — G89.29 CHRONIC PAIN OF BOTH KNEES: ICD-10-CM

## 2024-11-19 DIAGNOSIS — M25.552 BILATERAL HIP PAIN: ICD-10-CM

## 2024-11-19 DIAGNOSIS — M25.551 BILATERAL HIP PAIN: ICD-10-CM

## 2024-11-19 DIAGNOSIS — G89.29 CHRONIC BILATERAL LOW BACK PAIN WITHOUT SCIATICA: ICD-10-CM

## 2024-11-19 DIAGNOSIS — R23.1 LIVEDO RETICULARIS: ICD-10-CM

## 2024-11-19 DIAGNOSIS — M54.50 CHRONIC BILATERAL LOW BACK PAIN WITHOUT SCIATICA: ICD-10-CM

## 2024-11-19 DIAGNOSIS — G89.29 CHRONIC PAIN OF MULTIPLE JOINTS: Primary | ICD-10-CM

## 2024-11-19 DIAGNOSIS — E55.9 VITAMIN D DEFICIENCY: ICD-10-CM

## 2024-11-19 DIAGNOSIS — M79.18 MYOFASCIAL PAIN: ICD-10-CM

## 2024-11-19 DIAGNOSIS — M25.561 CHRONIC PAIN OF BOTH KNEES: ICD-10-CM

## 2024-11-19 DIAGNOSIS — M25.50 CHRONIC PAIN OF MULTIPLE JOINTS: Primary | ICD-10-CM

## 2024-11-19 DIAGNOSIS — M25.562 CHRONIC PAIN OF BOTH KNEES: ICD-10-CM

## 2024-11-19 NOTE — PROGRESS NOTES
This document was created using a software with less than 100% fidelity, at times resulting in unintended, even erroneous syntax and grammar.  The reader is advised to keep this under consideration while reviewing, interpreting this note.      Rheumatology Consult Note      Rufino Farr     YOB: 1990 Age: 34 year old    Date of visit: 11/19/24    PCP: Sofia Anne    Chief Complaint   Widespread myofascial pain (years), spine, hip, knee pain        Assessment and Plan   1.  Rufino has widespread myofascial pain but primarily involving her spine, hips and knees.  She also has multiple trigger points which can be seen in fibromyalgia.  Given the distribution of her pain we will screen her for spondylitis by checking an HLA-B27 test and x-rays of the sacroiliac joints.  ESR and CRP are also being ordered, however they are nonspecific markers which may signify inflammation but can also be elevated due to other causes.  If the HLA-B27 is negative, the sed rate, CRP are normal and the x-ray of the sacroiliac joints are normal then spondylitis can be ruled out.  If there is evidence for spondylitis the treatment would be biologic medications i.e. Humira, Enbrel, Cosentyx, Taltz which was discussed with the patient.    Managing fibromyalgia is beyond the scope of her rheumatology practice, she has tried Cymbalta in the past (had bad side effects), fibromyalgia as best managed by primary care providers, pain clinics and a combination of activity modification, aerobic exercise etc.  There is no specific treatment for fibromyalgia.    2.  I do not see objective synovitis on her joint exam but she does have some tenderness in the MCP, PIP joints, will therefore check a CCP and RF for rheumatoid arthritis.    3.  She has a livido pattern on her lower extremities, this can be seen in multiple conditions including autoimmune thyroid disorders.  Will screen for anticardiolipin antibodies, beta-2  "glycoprotein one antibodies and LA to rule out the risk of blood clots which can be seen in a subset of patients with libido.  However again libido is not specific for any single disease or condition.    4.  Screening for vitamin D deficiency.  Very low vitamin D levels can cause unexplained and widespread myofascial and bone pain.    Follow-up 3 weeks    CC PCP          ERINN   Rufino is a pleasant 34-year-old female with type 1 diabetes (on insulin pump), history of Hashimoto's thyroiditis (on thyroxine supplementation).  She gives a longstanding history of widespread pain at least since her late teens.  She has had bilateral carpal tunnel release and bilateral cubital tunnel release.  She also has some hypermobility of the joints.  She specifically complains of a lot of pain and stiffness in her hips, knees, shoulders (\"am very very stiff in the morning \").  She endorses that it feels like she has \"glass in my hips \".  The shoulders, knees, hips are the most symptomatic areas but she also complains of pain in her distal joints.  She does not have objective synovitis on her joint exam today but does have some tenderness in her knuckles (see exam).  Her pain is worse in the morning, morning stiffness at least 2 hours.  Her pain is exacerbated both by sitting still as well as walking and activity.  She has been taking Indocin on a regular basis which does help her pain.  She tried ibuprofen and Tylenol and those were less effective.  The pain varies in intensity from 6-9/10.  She has a lacy, livedoid type pattern on her lower extremities and we will screen for antiphospholipid antibodies (see assessment and plan).  She has a history of depression and is currently on Elavil.  Tried Cymbalta in the past but had severe side effects.  Also tried Zoloft.    She follows with endocrinology for type 1 diabetes, according to the patient her most recent hemoglobin A1c was 7.4%.  She is also on thyroxine supplementation for " hypothyroidism.  She is being worked up for POTS.    Family history is negative for rheumatoid arthritis, lupus, psoriasis or spondylitis.    She has had a hysterectomy with bilateral salpingo-oophorectomy (polycystic ovary).    She is , has a 14-year-old daughter (had 6 miscarriages prior to that), vapes, no alcohol or recreational drugs.  She works full-time for a cleaning company.  She does exercise regularly (walking, stretching, light weights).             Active Problem List     Patient Active Problem List   Diagnosis    Complicated migraine, intractable    Myofascial pain    Diabetes mellitus, type 2 (H)    Type 1 diabetes mellitus without complication (H)    Hypothyroidism due to Hashimoto's thyroiditis     Past Medical History     Past Medical History:   Diagnosis Date    Depression     with Anxiety    Diabetes (H) 2017    Type 1    Hashimoto's thyroiditis 2010    PTSD (post-traumatic stress disorder)      Past Surgical History     Past Surgical History:   Procedure Laterality Date    ESOPHAGOSCOPY, GASTROSCOPY, DUODENOSCOPY (EGD), COMBINED N/A 11/7/2024    Procedure: ESOPHAGOGASTRODUODENOSCOPY, WITH BIOPSY;  Surgeon: Aida Bermudez MD;  Location: [facility] OR    HYSTERECTOMY, PAP NO LONGER INDICATED Bilateral      Allergy     Allergies   Allergen Reactions    Aspirin Shortness Of Breath    Nickel      Current Medication List     Current Outpatient Medications   Medication Sig Dispense Refill    acetaminophen (TYLENOL) 325 MG tablet Take 2 tablets (650 mg) by mouth every 4 hours as needed for mild pain or fever      amitriptyline (ELAVIL) 100 MG tablet TAKE 1 TABLET(100 MG) BY MOUTH AT BEDTIME 30 tablet 3    blood glucose (NO BRAND SPECIFIED) test strip Use to test blood sugar 4 times daily while off sensor 100 strip 11    blood glucose monitoring (NO BRAND SPECIFIED) meter device kit Use to test blood sugar 4 times daily or as directed. 1 kit 0    calcium carbonate (TUMS) 500 MG chewable tablet Take 1  "tablet (500 mg) by mouth 4 times daily as needed for heartburn      cetirizine (ZYRTEC) 10 MG tablet Take 1 tablet (10 mg) by mouth every evening. 30 tablet 3    Continuous Glucose  (DEXCOM G7 ) ASCENCION Use to read blood sugars as per 's instructions. 1 each 0    Continuous Glucose Sensor (DEXCOM G7 SENSOR) MISC Change every 10 days. 3 each 5    estradiol (ESTRACE) 1 MG tablet TAKE 1 TABLET(1 MG) BY MOUTH EVERY EVENING 30 tablet 3    HUMALOG 100 UNIT/ML injection INJECT 1 UNIT FOUR TIMES DAILY(WITH MEALS AND NIGHTLY) MAXIMUM DOSE OF 65 UNITS VIA INSULIN PUMP AS DIRECTED 10 mL 3    ibuprofen (ADVIL/MOTRIN) 800 MG tablet Take 1 tablet (800 mg) by mouth every 6 hours as needed for inflammatory pain      indomethacin (INDOCIN) 50 MG capsule Take 100 mg by mouth 2 times daily as needed for moderate pain      Insulin Infusion Pump Supplies (AUTOSOFT 90 INFUSION SET) MISC 1 each every 3 days. 23\" 6 mm 10 each 11    Insulin Infusion Pump Supplies (T:SLIM X2 3ML CARTRIDGE) MISC 1 each every 3 days. 10 each 11    levothyroxine (SYNTHROID/LEVOTHROID) 137 MCG tablet Take 1 tablet (137 mcg) by mouth daily. 90 tablet 3    metoprolol succinate ER (TOPROL XL) 25 MG 24 hr tablet Take 1 tablet (25 mg) by mouth 2 times daily. 180 tablet 3    metroNIDAZOLE (METROGEL) 0.75 % vaginal gel Place vaginally twice a week As needed      ondansetron (ZOFRAN ODT) 4 MG ODT tab Take 1 tablet (4 mg) by mouth every 8 hours as needed for nausea 14 tablet 0    tiZANidine (ZANAFLEX) 4 MG tablet Take 1 tablet (4 mg) by mouth 3 times daily 90 tablet 3     No current facility-administered medications for this visit.       No current facility-administered medications for this visit.        Family History     Family History   Problem Relation Age of Onset    Glaucoma No family hx of     Macular Degeneration No family hx of          Physical Exam     COMPREHENSIVE EXAMINATION:  Vitals:    11/19/24 1521   BP: 117/76   BP Location: " "Right arm   Patient Position: Sitting   Cuff Size: Adult Regular   Pulse: 86   SpO2: 100%   Weight: 83.5 kg (184 lb)   Height: 1.638 m (5' 4.5\")   Head/neck: No butterfly rash, no jaundice, no conjunctival pallor, no ocular redness, no facial asymmetry, no enlargement of the major salivary glands    Lungs: clear to auscultation, no crackles or wheezing    Heart sounds :regular    Abdomen: Soft, nontender    Musculoskeletal:    Right hand: No synovitis + mild tenderness of 1-5 MCP joints, no synovitis or tenderness of 1-5 PIP joints.  No swan-neck or boutonniere deformities    Left hand: No synovitis or tenderness of 1-5 MCP joints, no synovitis ,+ tenderness of 1-5 PIP joints, no swan-neck or boutonniere deformities    Right wrist: no synovitis,no tenderness    Left wrist: No tenderness, no synovitis    Elbows: Full range of motion, no swelling or synovitis    Right shoulder: Normal abduction, internal and external rotation    Left shoulder: Normal abduction, internal and external rotation    Cervical spine: Normal flexion, lateral rotation bilaterally full    Spine: No alignment abnormalities noted    Right hip:Full  Flexion internal and external rotation    Left hip: Full flexion, internal and external rotation    Right knee: no effusion, no redness, full ROM    Left knee: No effusion, no redness, full ROM    Rt ankle: No swelling, redness or tenderness    Left ankle: No swelling, redness or tenderness    Right foot: No swelling, redness or tenderness of the toes/MTPs    Left foot: No swelling, redness or tenderness of the toes/MTP joints    Skin.  Livido racemosa type pattern noted on the thighs, tattoos noted on the back and left upper extremity    Trigger point exam for fibromyalgia shows 14/18 trigger points in the posterior cervical, trapezii, paralumbar, chest, elbows and left knee           Labs / Imaging (select studies)     No results found for: \"PPDINDURATIO\", \"PPDREDNESS\", \"TBGOLT\", \"RHF\", \"CCPABG\", " "\"URIC\", \"VG81751\", \"RX411366\", \"ANTIDNA\", \"ANTIDNAINT\", \"CARIA\", \"KG79122\", \"LO156647\", \"ENASM\", \"ENASCL\", \"JO1\", \"ENASSA\", \"ENASSB\", \"CENTA\", \"A6WPLSZ\", \"F6AVWEP\", \"EU8569\"   Hemoglobin   Date Value Ref Range Status   05/25/2024 14.0 11.7 - 15.7 g/dL Final     Urea Nitrogen   Date Value Ref Range Status   07/12/2024 13.0 6.0 - 20.0 mg/dL Final   05/25/2024 12.1 6.0 - 20.0 mg/dL Final     AST   Date Value Ref Range Status   07/12/2024 29 0 - 45 U/L Final     Comment:     Reference intervals for this test were updated on 6/12/2023 to more accurately reflect our healthy population. There may be differences in the flagging of prior results with similar values performed with this method. Interpretation of those prior results can be made in the context of the updated reference intervals.     Albumin   Date Value Ref Range Status   07/12/2024 4.7 3.5 - 5.2 g/dL Final     Alkaline Phosphatase   Date Value Ref Range Status   07/12/2024 64 40 - 150 U/L Final     ALT   Date Value Ref Range Status   07/12/2024 25 0 - 50 U/L Final     Comment:     Reference intervals for this test were updated on 6/12/2023 to more accurately reflect our healthy population. There may be differences in the flagging of prior results with similar values performed with this method. Interpretation of those prior results can be made in the context of the updated reference intervals.            Immunization History     Immunization History   Administered Date(s) Administered    HPV Quadrivalent 01/22/2010    Influenza (H1N1) 01/22/2010    Influenza (prior to 2024) 01/22/2010    TDAP (Adacel,Boostrix) 05/10/2024    Td (Adult), Adsorbed 03/16/2004             "

## 2024-11-29 ENCOUNTER — TELEPHONE (OUTPATIENT)
Dept: RHEUMATOLOGY | Facility: CLINIC | Age: 34
End: 2024-11-29
Payer: MEDICARE

## 2024-11-29 NOTE — TELEPHONE ENCOUNTER
Called and left a detail message that patient need a lab now prior to appt with Dr. Tovar on 12/10.     Please schedule lab appt when patient return call. Labs order on file.

## 2024-12-04 ENCOUNTER — HOSPITAL ENCOUNTER (OUTPATIENT)
Dept: GENERAL RADIOLOGY | Facility: CLINIC | Age: 34
Discharge: HOME | End: 2024-12-04
Attending: INTERNAL MEDICINE
Payer: MEDICARE

## 2024-12-04 ENCOUNTER — LAB (OUTPATIENT)
Dept: LAB | Facility: CLINIC | Age: 34
End: 2024-12-04
Attending: INTERNAL MEDICINE
Payer: MEDICARE

## 2024-12-04 DIAGNOSIS — M25.50 CHRONIC PAIN OF MULTIPLE JOINTS: ICD-10-CM

## 2024-12-04 DIAGNOSIS — M54.50 CHRONIC BILATERAL LOW BACK PAIN WITHOUT SCIATICA: ICD-10-CM

## 2024-12-04 DIAGNOSIS — G89.29 CHRONIC PAIN OF MULTIPLE JOINTS: ICD-10-CM

## 2024-12-04 DIAGNOSIS — G89.29 CHRONIC BILATERAL LOW BACK PAIN WITHOUT SCIATICA: ICD-10-CM

## 2024-12-04 DIAGNOSIS — E55.9 VITAMIN D DEFICIENCY: ICD-10-CM

## 2024-12-04 DIAGNOSIS — R23.1 LIVEDO RETICULARIS: ICD-10-CM

## 2024-12-04 DIAGNOSIS — E06.3 HYPOTHYROIDISM DUE TO HASHIMOTO'S THYROIDITIS: ICD-10-CM

## 2024-12-04 LAB
CRP SERPL-MCNC: <3 MG/L
ERYTHROCYTE [SEDIMENTATION RATE] IN BLOOD BY WESTERGREN METHOD: 8 MM/HR (ref 0–20)
RHEUMATOID FACT SERPL-ACNC: <10 IU/ML
T4 FREE SERPL-MCNC: 1.15 NG/DL (ref 0.9–1.7)
TSH SERPL DL<=0.005 MIU/L-ACNC: 14.48 UIU/ML (ref 0.3–4.2)

## 2024-12-04 PROCEDURE — 81374 HLA I TYPING 1 ANTIGEN LR: CPT

## 2024-12-04 PROCEDURE — 84443 ASSAY THYROID STIM HORMONE: CPT

## 2024-12-04 PROCEDURE — 85652 RBC SED RATE AUTOMATED: CPT

## 2024-12-04 PROCEDURE — 85613 RUSSELL VIPER VENOM DILUTED: CPT

## 2024-12-04 PROCEDURE — 85730 THROMBOPLASTIN TIME PARTIAL: CPT

## 2024-12-04 PROCEDURE — 86147 CARDIOLIPIN ANTIBODY EA IG: CPT

## 2024-12-04 PROCEDURE — 86146 BETA-2 GLYCOPROTEIN ANTIBODY: CPT

## 2024-12-04 PROCEDURE — 84439 ASSAY OF FREE THYROXINE: CPT

## 2024-12-04 PROCEDURE — 85390 FIBRINOLYSINS SCREEN I&R: CPT | Mod: 26 | Performed by: PATHOLOGY

## 2024-12-04 PROCEDURE — 86431 RHEUMATOID FACTOR QUANT: CPT

## 2024-12-04 PROCEDURE — 86200 CCP ANTIBODY: CPT

## 2024-12-04 PROCEDURE — 86140 C-REACTIVE PROTEIN: CPT

## 2024-12-04 PROCEDURE — 72200 X-RAY EXAM SI JOINTS: CPT

## 2024-12-04 PROCEDURE — 36415 COLL VENOUS BLD VENIPUNCTURE: CPT

## 2024-12-05 LAB
DRVVT SCREEN RATIO: 0.74
INR PPP: 0.93 (ref 0.85–1.15)
LA PPP-IMP: NEGATIVE
LUPUS INTERPRETATION: NORMAL
PTT RATIO: 0.94
THROMBIN TIME: 16.5 SECONDS (ref 13–19)

## 2024-12-06 PROBLEM — R10.2 PELVIC PAIN: Status: ACTIVE | Noted: 2018-12-21

## 2024-12-06 PROBLEM — E66.3 PATIENT OVERWEIGHT: Status: ACTIVE | Noted: 2017-07-29

## 2024-12-06 PROBLEM — M25.529 ELBOW PAIN: Status: ACTIVE | Noted: 2023-05-09

## 2024-12-06 PROBLEM — N76.0 BACTERIAL VAGINOSIS: Status: ACTIVE | Noted: 2023-05-09

## 2024-12-06 PROBLEM — B96.89 BACTERIAL VAGINOSIS: Status: ACTIVE | Noted: 2023-05-09

## 2024-12-06 PROBLEM — E11.9 DIABETES MELLITUS, TYPE 2 (H): Status: RESOLVED | Noted: 2024-09-09 | Resolved: 2024-12-06

## 2024-12-06 PROBLEM — L08.82 OMPHALITIS IN ADULT: Status: ACTIVE | Noted: 2022-04-22

## 2024-12-06 PROBLEM — R10.9 INTRACTABLE ABDOMINAL PAIN: Status: ACTIVE | Noted: 2023-03-27

## 2024-12-06 PROBLEM — R11.0 NAUSEA: Status: ACTIVE | Noted: 2023-05-09

## 2024-12-06 PROBLEM — R10.13 EPIGASTRIC PAIN: Status: ACTIVE | Noted: 2022-03-22

## 2024-12-06 PROBLEM — R68.81 EARLY SATIETY: Status: ACTIVE | Noted: 2023-05-09

## 2024-12-06 PROBLEM — H53.149 VISUAL DISCOMFORT: Status: ACTIVE | Noted: 2023-05-09

## 2024-12-06 PROBLEM — R53.83 FATIGUE: Status: ACTIVE | Noted: 2023-05-09

## 2024-12-06 PROBLEM — N83.202 RUPTURED CYST OF LEFT OVARY: Status: ACTIVE | Noted: 2023-03-27

## 2024-12-06 LAB
B2 GLYCOPROT1 IGG SERPL IA-ACNC: 1.1 U/ML
B2 GLYCOPROT1 IGM SERPL IA-ACNC: <2.4 U/ML
CARDIOLIPIN IGG SER IA-ACNC: <2 GPL-U/ML
CARDIOLIPIN IGG SER IA-ACNC: NEGATIVE
CARDIOLIPIN IGM SER IA-ACNC: <2 MPL-U/ML
CARDIOLIPIN IGM SER IA-ACNC: NEGATIVE
CCP AB SER IA-ACNC: 1.3 U/ML

## 2024-12-09 ENCOUNTER — TELEPHONE (OUTPATIENT)
Dept: ENDOCRINOLOGY | Facility: CLINIC | Age: 34
End: 2024-12-09

## 2024-12-09 NOTE — TELEPHONE ENCOUNTER
Left Voicemail (1st Attempt) for the patient to call back and schedule the following:    Appointment type: return endocrine   Provider: Ivan   Return date: re-jordan 12/9 to next avail   Specialty phone number: [phone]  Additional appointment(s) needed:   Additonal Notes: LVM, MyC x1   Due to changes in providers schedule appt on 12/9 needs to get re-jordan to the next avail visit. Below are next avail appts I put on hold for re-jordan. If the appts are scheduled or do not work for pt re-jordan to next avail. Thank you.     Examples of appts on hold, if do not work for pt schedule next avail:  -12/27 [facility] in person csc (slot on hold)   -1/21 [facility] virtual csc (slot on hold)     Julia Castro on 12/9/2024 at 8:33 AM

## 2024-12-09 NOTE — TELEPHONE ENCOUNTER
Left Voicemail (2nd Attempt) for the patient to call back and schedule the following:    Appointment type: return endocrine   Provider: Ivan   Return date: re-jordan 12/9 to next avail   Specialty phone number: [phone]  Additional appointment(s) needed:   Additonal Notes: LVM x2, MyC x1   Due to changes in providers schedule appt on 12/9 needs to get re-jordan to the next avail visit. Below are next avail appts I put on hold for re-jordan. If the appts are scheduled or do not work for pt re-jordan to next avail. Thank you. (Appt now canceled due to 2nd attempt)     Examples of appts on hold, if do not work for pt schedule next avail:  -12/27 [facility] in person csc (slot on hold)   -1/21 [facility] virtual csc (slot on hold)     Julia Castro on 12/9/2024 at 11:35 AM

## 2024-12-10 LAB
B LOCUS: NORMAL
B27TEST METHOD: NORMAL

## 2024-12-15 ENCOUNTER — HEALTH MAINTENANCE LETTER (OUTPATIENT)
Age: 34
End: 2024-12-15

## 2024-12-30 ENCOUNTER — TELEPHONE (OUTPATIENT)
Dept: ENDOCRINOLOGY | Facility: CLINIC | Age: 34
End: 2024-12-30
Payer: MEDICARE

## 2024-12-30 DIAGNOSIS — E10.9 TYPE 1 DIABETES MELLITUS WITHOUT COMPLICATION (H): Primary | ICD-10-CM

## 2024-12-30 DIAGNOSIS — M79.10 MUSCLE PAIN: ICD-10-CM

## 2024-12-30 RX ORDER — INSULIN LISPRO 100 [IU]/ML
INJECTION, SOLUTION INTRAVENOUS; SUBCUTANEOUS
Qty: 70 ML | Refills: 3 | Status: SHIPPED | OUTPATIENT
Start: 2024-12-30

## 2024-12-30 RX ORDER — SYRINGE-NEEDLE,INSULIN,0.5 ML 27GX1/2"
SYRINGE, EMPTY DISPOSABLE MISCELLANEOUS
Qty: 600 EACH | Refills: 1 | Status: SHIPPED | OUTPATIENT
Start: 2024-12-30

## 2024-12-30 NOTE — TELEPHONE ENCOUNTER
[facility]    Phone Message    May a detailed message be left on voicemail: yes     Reason for Call: Patient called reporting red-flag symptom: Blood sugar is around 332.and she has no insulin and her insulin pump is displaying an error message and gave an error code.    Per the P Red-Flag symptom list, patient was: sent high priority message to the clinic.                Action Taken: Message routed to:  [facility] END[facility]    Travel Screening: Not Applicable     Date of Service:

## 2025-01-06 ENCOUNTER — TELEPHONE (OUTPATIENT)
Dept: ENDOCRINOLOGY | Facility: CLINIC | Age: 35
End: 2025-01-06
Payer: MEDICARE

## 2025-01-06 NOTE — TELEPHONE ENCOUNTER
Shouldn't she schedule in Education for new pump assistance ? Referral written 7/2024 [place], RN on 1/6/2025 at 4:45 PM

## 2025-01-06 NOTE — TELEPHONE ENCOUNTER
[facility]    Phone Message    May a detailed message be left on voicemail: yes     Reason for Call: Other: Patient states she needs a new pump and [employer] told her to reach out to get all her old pump information and flow rate. Please call thank you.      Action Taken: Message routed to:  [facility] endo    Travel Screening: Not Applicable     Date of Service:

## 2025-02-11 DIAGNOSIS — F41.9 ANXIETY AND DEPRESSION: ICD-10-CM

## 2025-02-11 DIAGNOSIS — Z78.0 MENOPAUSE PRESENT: ICD-10-CM

## 2025-02-11 DIAGNOSIS — F32.A ANXIETY AND DEPRESSION: ICD-10-CM

## 2025-02-11 RX ORDER — ESTRADIOL 1 MG/1
1 TABLET ORAL EVERY EVENING
Qty: 30 TABLET | Refills: 3 | Status: SHIPPED | OUTPATIENT
Start: 2025-02-11

## 2025-02-12 RX ORDER — AMITRIPTYLINE HYDROCHLORIDE 100 MG/1
100 TABLET ORAL AT BEDTIME
Qty: 30 TABLET | Refills: 3 | Status: SHIPPED | OUTPATIENT
Start: 2025-02-12

## 2025-02-13 DIAGNOSIS — J30.2 SEASONAL ALLERGIC RHINITIS, UNSPECIFIED TRIGGER: ICD-10-CM

## 2025-02-13 RX ORDER — CETIRIZINE HYDROCHLORIDE 10 MG/1
10 TABLET ORAL EVERY EVENING
Qty: 30 TABLET | Refills: 3 | Status: SHIPPED | OUTPATIENT
Start: 2025-02-13

## 2025-03-06 DIAGNOSIS — E10.9 TYPE 1 DIABETES MELLITUS WITHOUT COMPLICATION (H): ICD-10-CM

## 2025-03-06 RX ORDER — ACYCLOVIR 400 MG/1
TABLET ORAL
Qty: 1 EACH | Refills: 1 | OUTPATIENT
Start: 2025-03-06

## 2025-03-06 NOTE — TELEPHONE ENCOUNTER
Last Written Prescription:  Continuous Glucose  (DEXCOM G7 ) ASCENCION 1 each 0 9/19/2024     ----------------------  Last Visit Date: 9/9/24---Follow-up in 3 months   Future Visit Date: 4/29/25  ----------------------      Refill decision: Medication refilled per  Medication Refill in Ambulatory Care  policy.    Coby Cardenas RN  [facility]/Red Flag Triage & Med Refill Team           Request from pharmacy:  Requested Prescriptions   Pending Prescriptions Disp Refills    Continuous Glucose  (DEXCOM G7 ) ASCENCION 1 each 0     Sig: Use to read blood sugars as per 's instructions.       There is no refill protocol information for this order

## 2025-03-06 NOTE — TELEPHONE ENCOUNTER
[facility]    Phone Message    May a detailed message be left on voicemail: yes     Reason for Call: Medication Refill Request    Has the patient contacted the pharmacy for the refill? Yes   Name of medication being requested: Continuous Glucose  (DEXCOM G7 ) ASCENCION    Provider who prescribed the medication: Dr Love   Pharmacy: [id number] [address] [place] [address]    Date medication is needed: ASAP  she has been out for 2 weeks.      Action Taken: Message routed to:  [facility] ENDO    Travel Screening: Not Applicable     Date of Service:

## 2025-03-16 ENCOUNTER — HEALTH MAINTENANCE LETTER (OUTPATIENT)
Age: 35
End: 2025-03-16

## 2025-03-20 ENCOUNTER — TELEPHONE (OUTPATIENT)
Dept: ENDOCRINOLOGY | Facility: CLINIC | Age: 35
End: 2025-03-20

## 2025-03-25 ENCOUNTER — TELEPHONE (OUTPATIENT)
Dept: ENDOCRINOLOGY | Facility: CLINIC | Age: 35
End: 2025-03-25
Payer: MEDICARE

## 2025-03-25 NOTE — TELEPHONE ENCOUNTER
Left Voicemail (1st Attempt) for the patient to call back and schedule the following:    Appointment type: RETURN ENDOCRINE  Provider: DARLIN Payne  Return date: Merlyn 4/29/25  Specialty phone number: [phone]  Additional appointment(s) needed: N/A  Additonal Notes:  LVM, [email or web address], Due to changes in the providers schedule appt on 4/29 needs to get rescheduled to first available slot.  Dwight Gayle on 3/25/2025 at 12:47 PM

## 2025-03-26 NOTE — TELEPHONE ENCOUNTER
Pt returned call to the clinic to reschedule her appt with Dr. Love on 4/29. Pt was rescheduled to 5/20 at 8:30am. Pt expressed frustration that this was the second appt that was cancelled, first on 12/9 and then on 4/29. Pt stated she is having a lot of concerns about her thyroid and is wanting to discuss what to do until pt is able to be seen

## 2025-03-27 ENCOUNTER — TELEPHONE (OUTPATIENT)
Dept: ENDOCRINOLOGY | Facility: CLINIC | Age: 35
End: 2025-03-27

## 2025-03-27 NOTE — TELEPHONE ENCOUNTER
Hello,    I did a test claim of Dexcom G7 Sensors and the patient's insurance([employer]) ID#: [id number] shows a paid claim of $50.00 co-pay for quantity of 3 sensors for 30 days. Would you like me to still initiate a PA?    Thank You!    Cornell Pleitez [facility] Liaison  ealth Kendrick [email or web address]  Phone: [phone]  Fax: [phone]       Niacinamide Counseling: I recommended taking niacin or niacinamide, also know as vitamin B3, twice daily. Recent evidence suggests that taking vitamin B3 (500 mg twice daily) can reduce the risk of actinic keratoses and non-melanoma skin cancers. Side effects of vitamin B3 include flushing and headache.

## 2025-03-27 NOTE — TELEPHONE ENCOUNTER
Patient very concerned.  States that she has not been feeling well.  Has not been able to get her senor refill order filled (see refill TE).  States that he appts have been rescheduled multiple times and she is extremely frustrated.

## 2025-05-11 DIAGNOSIS — F32.A ANXIETY AND DEPRESSION: ICD-10-CM

## 2025-05-11 DIAGNOSIS — Z78.0 MENOPAUSE PRESENT: ICD-10-CM

## 2025-05-11 DIAGNOSIS — F41.9 ANXIETY AND DEPRESSION: ICD-10-CM

## 2025-05-12 RX ORDER — AMITRIPTYLINE HYDROCHLORIDE 100 MG/1
100 TABLET ORAL AT BEDTIME
Qty: 90 TABLET | Refills: 1 | Status: SHIPPED | OUTPATIENT
Start: 2025-05-12

## 2025-05-12 RX ORDER — ESTRADIOL 1 MG/1
1 TABLET ORAL EVERY EVENING
Qty: 90 TABLET | Refills: 1 | Status: SHIPPED | OUTPATIENT
Start: 2025-05-12

## 2025-05-25 DIAGNOSIS — M79.10 MUSCLE PAIN: ICD-10-CM

## 2025-06-12 ENCOUNTER — PATIENT OUTREACH (OUTPATIENT)
Dept: CARE COORDINATION | Facility: CLINIC | Age: 35
End: 2025-06-12
Payer: MEDICARE

## 2025-06-29 ENCOUNTER — HEALTH MAINTENANCE LETTER (OUTPATIENT)
Age: 35
End: 2025-06-29

## 2025-08-14 DIAGNOSIS — M79.10 MUSCLE PAIN: ICD-10-CM

## 2025-08-31 ENCOUNTER — HEALTH MAINTENANCE LETTER (OUTPATIENT)
Age: 35
End: 2025-08-31

## (undated) DEVICE — SOL WATER IRRIG 500ML BOTTLE 2F7113

## (undated) DEVICE — SUCTION MANIFOLD NEPTUNE 2 SYS 1 PORT 702-025-000

## (undated) DEVICE — GLOVE EXAM NITRILE LG PF LATEX FREE 5064

## (undated) DEVICE — GOWN IMPERVIOUS 2XL BLUE

## (undated) DEVICE — SYR 50ML SLIP TIP W/O NDL 309654

## (undated) DEVICE — KIT ENDO TURNOVER/PROCEDURE CARRY-ON 101822

## (undated) DEVICE — ENDO BITE BLOCK ADULT OMNI-BLOC

## (undated) DEVICE — TUBING SUCTION MEDI-VAC 1/4"X20' N620A